# Patient Record
Sex: MALE | Race: WHITE | Employment: FULL TIME | URBAN - METROPOLITAN AREA
[De-identification: names, ages, dates, MRNs, and addresses within clinical notes are randomized per-mention and may not be internally consistent; named-entity substitution may affect disease eponyms.]

---

## 2017-05-04 ENCOUNTER — HOSPITAL ENCOUNTER (OUTPATIENT)
Dept: ENDOSCOPY | Age: 57
Discharge: HOME | End: 2017-05-04
Attending: INTERNAL MEDICINE | Admitting: INTERNAL MEDICINE

## 2017-05-04 LAB
A/G RATIO: 1 (ref 1.1–2.2)
ALBUMIN SERPL-MCNC: 3.5 G/DL (ref 3.4–5)
ALP BLD-CCNC: 37 U/L (ref 40–129)
ALT SERPL-CCNC: 17 U/L (ref 10–40)
ANION GAP SERPL CALCULATED.3IONS-SCNC: 13 MMOL/L (ref 3–16)
ANISOCYTOSIS: ABNORMAL
APTT: 33 SEC (ref 21–31.8)
AST SERPL-CCNC: 24 U/L (ref 15–37)
BASOPHILS ABSOLUTE: 0 K/UL (ref 0–0.2)
BASOPHILS RELATIVE PERCENT: 0.9 %
BILIRUB SERPL-MCNC: 1.2 MG/DL (ref 0–1)
BUN BLDV-MCNC: 7 MG/DL (ref 7–20)
CALCIUM SERPL-MCNC: 8.5 MG/DL (ref 8.3–10.6)
CHLORIDE BLD-SCNC: 100 MMOL/L (ref 99–110)
CO2: 26 MMOL/L (ref 21–32)
CREAT SERPL-MCNC: 1.1 MG/DL (ref 0.9–1.3)
EOSINOPHILS ABSOLUTE: 0.1 K/UL (ref 0–0.6)
EOSINOPHILS RELATIVE PERCENT: 1.4 %
GFR AFRICAN AMERICAN: >60
GFR NON-AFRICAN AMERICAN: >60
GLOBULIN: 3.5 G/DL
GLUCOSE BLD-MCNC: 99 MG/DL (ref 70–99)
HCT VFR BLD CALC: 33.9 % (ref 40.5–52.5)
HEMATOLOGY PATH CONSULT: YES
HEMOGLOBIN: 10.1 G/DL (ref 13.5–17.5)
IGA: 240 MG/DL (ref 70–400)
INR BLD: 1.19 (ref 0.85–1.15)
LYMPHOCYTES ABSOLUTE: 0.9 K/UL (ref 1–5.1)
LYMPHOCYTES RELATIVE PERCENT: 19 %
MAGNESIUM: 2.2 MG/DL (ref 1.8–2.4)
MCH RBC QN AUTO: 20.7 PG (ref 26–34)
MCHC RBC AUTO-ENTMCNC: 29.9 G/DL (ref 31–36)
MCV RBC AUTO: 69.2 FL (ref 80–100)
MICROCYTES: ABNORMAL
MONOCYTES ABSOLUTE: 0.6 K/UL (ref 0–1.3)
MONOCYTES RELATIVE PERCENT: 13 %
NEUTROPHILS ABSOLUTE: 3.3 K/UL (ref 1.7–7.7)
NEUTROPHILS RELATIVE PERCENT: 65.7 %
PDW BLD-RTO: 26.9 % (ref 12.4–15.4)
PLATELET # BLD: 207 K/UL (ref 135–450)
PLATELET SLIDE REVIEW: ADEQUATE
PMV BLD AUTO: 8.7 FL (ref 5–10.5)
POLYCHROMASIA: ABNORMAL
POTASSIUM SERPL-SCNC: 4.2 MMOL/L (ref 3.5–5.1)
PROTHROMBIN TIME: 13.5 SEC (ref 9.6–13)
RBC # BLD: 4.89 M/UL (ref 4.2–5.9)
SLIDE REVIEW: ABNORMAL
SODIUM BLD-SCNC: 139 MMOL/L (ref 136–145)
TOTAL PROTEIN: 7 G/DL (ref 6.4–8.2)
WBC # BLD: 5 K/UL (ref 4–11)

## 2017-05-05 LAB — HEMATOLOGY PATH CONSULT: NORMAL

## 2017-05-06 LAB
CEA: 4.6 NG/ML (ref 0–5)
TISSUE TRANSGLUTAMINASE IGA: 0 U/ML (ref 0–3)

## 2017-05-10 PROBLEM — C18.2 MALIGNANT NEOPLASM OF ASCENDING COLON (HCC): Status: ACTIVE | Noted: 2017-05-10

## 2017-05-10 PROBLEM — R05.9 COUGH: Status: ACTIVE | Noted: 2017-05-10

## 2017-05-12 ENCOUNTER — OFFICE VISIT (OUTPATIENT)
Dept: SURGERY | Age: 57
End: 2017-05-12

## 2017-05-12 VITALS
WEIGHT: 223 LBS | DIASTOLIC BLOOD PRESSURE: 90 MMHG | HEIGHT: 71 IN | SYSTOLIC BLOOD PRESSURE: 142 MMHG | BODY MASS INDEX: 31.22 KG/M2

## 2017-05-12 DIAGNOSIS — C18.2 MALIGNANT NEOPLASM OF ASCENDING COLON (HCC): Primary | ICD-10-CM

## 2017-05-12 PROCEDURE — 99203 OFFICE O/P NEW LOW 30 MIN: CPT | Performed by: SURGERY

## 2017-05-12 ASSESSMENT — ENCOUNTER SYMPTOMS
COUGH: 1
ALLERGIC/IMMUNOLOGIC NEGATIVE: 1
GASTROINTESTINAL NEGATIVE: 1
EYES NEGATIVE: 1

## 2017-05-17 ENCOUNTER — HOSPITAL ENCOUNTER (OUTPATIENT)
Dept: CT IMAGING | Age: 57
Discharge: HOME | End: 2017-05-17
Admitting: INTERNAL MEDICINE

## 2017-05-17 ENCOUNTER — HOSPITAL ENCOUNTER (OUTPATIENT)
Dept: PREADMISSION TESTING | Age: 57
Discharge: HOME | End: 2017-05-17
Attending: SURGERY | Admitting: SURGERY

## 2017-05-17 VITALS — BODY MASS INDEX: 30.52 KG/M2 | HEIGHT: 71 IN | WEIGHT: 218 LBS

## 2017-05-17 DIAGNOSIS — C18.2 MALIGNANT NEOPLASM OF ASCENDING COLON (HCC): ICD-10-CM

## 2017-05-17 DIAGNOSIS — R05.9 COUGH: ICD-10-CM

## 2017-05-17 LAB
ABO/RH: NORMAL
ANTIBODY SCREEN: NORMAL
APTT: 33.1 SEC (ref 21–31.8)
INR BLD: 1.01 (ref 0.85–1.15)
PROTHROMBIN TIME: 11.4 SEC (ref 9.6–13)

## 2017-05-27 PROBLEM — C18.9 COLON CANCER METASTASIZED TO LIVER (HCC): Status: ACTIVE | Noted: 2017-05-27

## 2017-05-27 PROBLEM — C78.7 COLON CANCER METASTASIZED TO LIVER (HCC): Status: ACTIVE | Noted: 2017-05-27

## 2017-05-30 ENCOUNTER — HOSPITAL ENCOUNTER (OUTPATIENT)
Dept: SURGERY | Age: 57
Discharge: HOME | End: 2017-05-30
Attending: SURGERY | Admitting: SURGERY

## 2017-05-30 ENCOUNTER — HOSPITAL ENCOUNTER (OUTPATIENT)
Dept: SURGERY | Age: 57
Discharge: HOME | End: 2017-05-15
Attending: SURGERY | Admitting: SURGERY

## 2017-05-30 VITALS
TEMPERATURE: 98.9 F | RESPIRATION RATE: 18 BRPM | HEIGHT: 71 IN | WEIGHT: 212.96 LBS | HEART RATE: 88 BPM | BODY MASS INDEX: 29.81 KG/M2 | SYSTOLIC BLOOD PRESSURE: 145 MMHG | OXYGEN SATURATION: 92 % | DIASTOLIC BLOOD PRESSURE: 87 MMHG

## 2017-05-30 DIAGNOSIS — C18.2 MALIGNANT NEOPLASM OF ASCENDING COLON (HCC): ICD-10-CM

## 2017-05-30 DIAGNOSIS — C18.2 MALIGNANT NEOPLASM OF ASCENDING COLON (HCC): Primary | ICD-10-CM

## 2017-05-30 DIAGNOSIS — R52 PAIN: ICD-10-CM

## 2017-05-30 LAB
ABO/RH: NORMAL
ANTIBODY SCREEN: NORMAL

## 2017-05-30 PROCEDURE — 77001 FLUOROGUIDE FOR VEIN DEVICE: CPT | Performed by: SURGERY

## 2017-05-30 PROCEDURE — 36561 INSERT TUNNELED CV CATH: CPT | Performed by: SURGERY

## 2017-05-30 RX ORDER — FENTANYL CITRATE 50 UG/ML
25 INJECTION, SOLUTION INTRAMUSCULAR; INTRAVENOUS EVERY 5 MIN PRN
Status: DISCONTINUED | OUTPATIENT
Start: 2017-05-30 | End: 2017-05-31 | Stop reason: HOSPADM

## 2017-05-30 RX ORDER — SODIUM CHLORIDE 0.9 % (FLUSH) 0.9 %
10 SYRINGE (ML) INJECTION PRN
Status: DISCONTINUED | OUTPATIENT
Start: 2017-05-30 | End: 2017-05-31 | Stop reason: HOSPADM

## 2017-05-30 RX ORDER — SODIUM CHLORIDE 0.9 % (FLUSH) 0.9 %
10 SYRINGE (ML) INJECTION EVERY 12 HOURS SCHEDULED
Status: DISCONTINUED | OUTPATIENT
Start: 2017-05-30 | End: 2017-05-31 | Stop reason: HOSPADM

## 2017-05-30 RX ORDER — SODIUM CHLORIDE, SODIUM LACTATE, POTASSIUM CHLORIDE, CALCIUM CHLORIDE 600; 310; 30; 20 MG/100ML; MG/100ML; MG/100ML; MG/100ML
INJECTION, SOLUTION INTRAVENOUS
Status: COMPLETED
Start: 2017-05-30 | End: 2017-05-30

## 2017-05-30 RX ORDER — FENTANYL CITRATE 50 UG/ML
50 INJECTION, SOLUTION INTRAMUSCULAR; INTRAVENOUS EVERY 5 MIN PRN
Status: DISCONTINUED | OUTPATIENT
Start: 2017-05-30 | End: 2017-05-31 | Stop reason: HOSPADM

## 2017-05-30 RX ORDER — HYDROCODONE BITARTRATE AND ACETAMINOPHEN 5; 325 MG/1; MG/1
1-2 TABLET ORAL EVERY 4 HOURS PRN
Qty: 20 TABLET | Refills: 0 | Status: SHIPPED | OUTPATIENT
Start: 2017-05-30 | End: 2017-06-06

## 2017-05-30 RX ORDER — OXYCODONE HYDROCHLORIDE AND ACETAMINOPHEN 5; 325 MG/1; MG/1
1 TABLET ORAL PRN
Status: ACTIVE | OUTPATIENT
Start: 2017-05-30 | End: 2017-05-30

## 2017-05-30 RX ORDER — OXYCODONE HYDROCHLORIDE AND ACETAMINOPHEN 5; 325 MG/1; MG/1
2 TABLET ORAL PRN
Status: ACTIVE | OUTPATIENT
Start: 2017-05-30 | End: 2017-05-30

## 2017-05-30 RX ORDER — HYDROMORPHONE HCL 110MG/55ML
0.5 PATIENT CONTROLLED ANALGESIA SYRINGE INTRAVENOUS EVERY 5 MIN PRN
Status: DISCONTINUED | OUTPATIENT
Start: 2017-05-30 | End: 2017-05-31 | Stop reason: HOSPADM

## 2017-05-30 RX ORDER — CEFAZOLIN SODIUM 2 G/100ML
2 INJECTION, SOLUTION INTRAVENOUS ONCE
Status: DISCONTINUED | OUTPATIENT
Start: 2017-05-30 | End: 2017-05-31 | Stop reason: HOSPADM

## 2017-05-30 RX ORDER — HYDROMORPHONE HCL 110MG/55ML
0.25 PATIENT CONTROLLED ANALGESIA SYRINGE INTRAVENOUS EVERY 5 MIN PRN
Status: DISCONTINUED | OUTPATIENT
Start: 2017-05-30 | End: 2017-05-31 | Stop reason: HOSPADM

## 2017-05-30 RX ORDER — LIDOCAINE HYDROCHLORIDE 10 MG/ML
1 INJECTION, SOLUTION EPIDURAL; INFILTRATION; INTRACAUDAL; PERINEURAL
Status: ACTIVE | OUTPATIENT
Start: 2017-05-30 | End: 2017-05-30

## 2017-05-30 RX ORDER — LABETALOL HYDROCHLORIDE 5 MG/ML
5 INJECTION, SOLUTION INTRAVENOUS EVERY 10 MIN PRN
Status: DISCONTINUED | OUTPATIENT
Start: 2017-05-30 | End: 2017-05-31 | Stop reason: HOSPADM

## 2017-05-30 RX ORDER — ONDANSETRON 2 MG/ML
4 INJECTION INTRAMUSCULAR; INTRAVENOUS
Status: ACTIVE | OUTPATIENT
Start: 2017-05-30 | End: 2017-05-30

## 2017-05-30 RX ORDER — CEFAZOLIN SODIUM 2 G/100ML
2 INJECTION, SOLUTION INTRAVENOUS ONCE
Status: COMPLETED | OUTPATIENT
Start: 2017-05-30 | End: 2017-05-30

## 2017-05-30 RX ORDER — MEPERIDINE HYDROCHLORIDE 25 MG/ML
12.5 INJECTION INTRAMUSCULAR; INTRAVENOUS; SUBCUTANEOUS EVERY 5 MIN PRN
Status: DISCONTINUED | OUTPATIENT
Start: 2017-05-30 | End: 2017-05-31 | Stop reason: HOSPADM

## 2017-05-30 RX ORDER — SODIUM CHLORIDE 9 MG/ML
INJECTION, SOLUTION INTRAVENOUS CONTINUOUS
Status: DISCONTINUED | OUTPATIENT
Start: 2017-05-30 | End: 2017-05-31 | Stop reason: HOSPADM

## 2017-05-30 RX ADMIN — SODIUM CHLORIDE, SODIUM LACTATE, POTASSIUM CHLORIDE, CALCIUM CHLORIDE 1000 ML: 600; 310; 30; 20 INJECTION, SOLUTION INTRAVENOUS at 10:59

## 2017-05-30 RX ADMIN — CEFAZOLIN SODIUM 2 G: 2 INJECTION, SOLUTION INTRAVENOUS at 12:21

## 2017-05-30 ASSESSMENT — PAIN - FUNCTIONAL ASSESSMENT: PAIN_FUNCTIONAL_ASSESSMENT: 0-10

## 2017-05-30 ASSESSMENT — PAIN SCALES - GENERAL
PAINLEVEL_OUTOF10: 0
PAINLEVEL_OUTOF10: 1
PAINLEVEL_OUTOF10: 1

## 2017-06-13 PROBLEM — T45.1X5A CHEMOTHERAPY-INDUCED NEUTROPENIA (HCC): Status: ACTIVE | Noted: 2017-06-13

## 2017-06-13 PROBLEM — D70.1 CHEMOTHERAPY-INDUCED NEUTROPENIA (HCC): Status: ACTIVE | Noted: 2017-06-13

## 2017-06-27 PROBLEM — Z51.11 ENCOUNTER FOR ANTINEOPLASTIC CHEMOTHERAPY: Status: ACTIVE | Noted: 2017-06-27

## 2017-07-19 ENCOUNTER — HOSPITAL ENCOUNTER (OUTPATIENT)
Dept: CT IMAGING | Age: 57
Discharge: HOME | End: 2017-07-19
Attending: INTERNAL MEDICINE | Admitting: INTERNAL MEDICINE

## 2017-07-19 DIAGNOSIS — C18.2 MALIGNANT NEOPLASM OF ASCENDING COLON (HCC): ICD-10-CM

## 2017-07-19 DIAGNOSIS — C18.9 COLON CANCER METASTASIZED TO LIVER (HCC): ICD-10-CM

## 2017-07-19 DIAGNOSIS — C78.7 COLON CANCER METASTASIZED TO LIVER (HCC): ICD-10-CM

## 2017-07-19 DIAGNOSIS — Z51.11 ENCOUNTER FOR ANTINEOPLASTIC CHEMOTHERAPY: ICD-10-CM

## 2017-08-08 PROBLEM — D69.59 CHEMOTHERAPY-INDUCED THROMBOCYTOPENIA: Status: ACTIVE | Noted: 2017-08-08

## 2017-08-08 PROBLEM — T45.1X5A CHEMOTHERAPY-INDUCED THROMBOCYTOPENIA: Status: ACTIVE | Noted: 2017-08-08

## 2017-09-22 ENCOUNTER — HOSPITAL ENCOUNTER (OUTPATIENT)
Dept: CT IMAGING | Age: 57
Discharge: HOME | End: 2017-09-22
Attending: NURSE PRACTITIONER | Admitting: NURSE PRACTITIONER

## 2017-09-22 DIAGNOSIS — C18.2 MALIGNANT NEOPLASM OF ASCENDING COLON (HCC): ICD-10-CM

## 2017-09-22 DIAGNOSIS — C18.2 ASCENDING COLON MALIGNANT NEOPLASM (HCC): ICD-10-CM

## 2017-09-29 ENCOUNTER — HOSPITAL ENCOUNTER (OUTPATIENT)
Dept: CT IMAGING | Age: 57
Discharge: HOME | End: 2017-09-29
Attending: INTERNAL MEDICINE | Admitting: INTERNAL MEDICINE

## 2017-09-29 DIAGNOSIS — R05.9 COUGH: ICD-10-CM

## 2017-09-29 LAB
A/G RATIO: 1.1 (ref 1.1–2.2)
ALBUMIN SERPL-MCNC: 4 G/DL (ref 3.4–5)
ALP BLD-CCNC: 67 U/L (ref 40–129)
ALT SERPL-CCNC: 33 U/L (ref 10–40)
ANION GAP SERPL CALCULATED.3IONS-SCNC: 10 MMOL/L (ref 3–16)
AST SERPL-CCNC: 42 U/L (ref 15–37)
BILIRUB SERPL-MCNC: 0.8 MG/DL (ref 0–1)
BUN BLDV-MCNC: 5 MG/DL (ref 7–20)
CALCIUM SERPL-MCNC: 8.8 MG/DL (ref 8.3–10.6)
CHLORIDE BLD-SCNC: 104 MMOL/L (ref 99–110)
CO2: 26 MMOL/L (ref 21–32)
CREAT SERPL-MCNC: 0.8 MG/DL (ref 0.9–1.3)
GFR AFRICAN AMERICAN: >60
GFR NON-AFRICAN AMERICAN: >60
GLOBULIN: 3.7 G/DL
GLUCOSE BLD-MCNC: 110 MG/DL (ref 70–99)
POTASSIUM SERPL-SCNC: 4.8 MMOL/L (ref 3.5–5.1)
SODIUM BLD-SCNC: 140 MMOL/L (ref 136–145)
TOTAL PROTEIN: 7.7 G/DL (ref 6.4–8.2)

## 2017-10-02 ENCOUNTER — PRE-PROCEDURE TELEPHONE (OUTPATIENT)
Dept: INTERVENTIONAL RADIOLOGY/VASCULAR | Age: 57
End: 2017-10-02

## 2017-10-05 ENCOUNTER — HOSPITAL ENCOUNTER (OUTPATIENT)
Dept: ULTRASOUND IMAGING | Age: 57
Discharge: HOME | End: 2017-10-05

## 2017-10-05 VITALS
BODY MASS INDEX: 30.92 KG/M2 | WEIGHT: 216 LBS | HEART RATE: 80 BPM | RESPIRATION RATE: 15 BRPM | OXYGEN SATURATION: 94 % | HEIGHT: 70 IN | DIASTOLIC BLOOD PRESSURE: 80 MMHG | TEMPERATURE: 97 F | SYSTOLIC BLOOD PRESSURE: 131 MMHG

## 2017-10-05 DIAGNOSIS — K76.9 LIVER LESION: ICD-10-CM

## 2017-10-05 DIAGNOSIS — C18.2 ASCENDING COLON MALIGNANT NEOPLASM (HCC): ICD-10-CM

## 2017-10-05 LAB
A/G RATIO: 1 (ref 1.1–2.2)
ALBUMIN SERPL-MCNC: 3.6 G/DL (ref 3.4–5)
ALP BLD-CCNC: 53 U/L (ref 40–129)
ALT SERPL-CCNC: 23 U/L (ref 10–40)
ANION GAP SERPL CALCULATED.3IONS-SCNC: 12 MMOL/L (ref 3–16)
APTT: 33.8 SEC (ref 24.1–34.9)
AST SERPL-CCNC: 33 U/L (ref 15–37)
BASOPHILS ABSOLUTE: 0 K/UL (ref 0–0.2)
BASOPHILS RELATIVE PERCENT: 0.2 %
BILIRUB SERPL-MCNC: 1 MG/DL (ref 0–1)
BUN BLDV-MCNC: 5 MG/DL (ref 7–20)
CALCIUM SERPL-MCNC: 8.6 MG/DL (ref 8.3–10.6)
CHLORIDE BLD-SCNC: 101 MMOL/L (ref 99–110)
CO2: 26 MMOL/L (ref 21–32)
CREAT SERPL-MCNC: 0.9 MG/DL (ref 0.9–1.3)
EOSINOPHILS ABSOLUTE: 0.1 K/UL (ref 0–0.6)
EOSINOPHILS RELATIVE PERCENT: 2.3 %
GFR AFRICAN AMERICAN: >60
GFR NON-AFRICAN AMERICAN: >60
GLOBULIN: 3.6 G/DL
GLUCOSE BLD-MCNC: 103 MG/DL (ref 70–99)
HCT VFR BLD CALC: 42.6 % (ref 40.5–52.5)
HEMOGLOBIN: 14.4 G/DL (ref 13.5–17.5)
INR BLD: 1 (ref 0.85–1.15)
LYMPHOCYTES ABSOLUTE: 1.1 K/UL (ref 1–5.1)
LYMPHOCYTES RELATIVE PERCENT: 26.1 %
MCH RBC QN AUTO: 34 PG (ref 26–34)
MCHC RBC AUTO-ENTMCNC: 33.9 G/DL (ref 31–36)
MCV RBC AUTO: 100.3 FL (ref 80–100)
MONOCYTES ABSOLUTE: 0.6 K/UL (ref 0–1.3)
MONOCYTES RELATIVE PERCENT: 14.8 %
NEUTROPHILS ABSOLUTE: 2.5 K/UL (ref 1.7–7.7)
NEUTROPHILS RELATIVE PERCENT: 56.6 %
PDW BLD-RTO: 19.5 % (ref 12.4–15.4)
PLATELET # BLD: 81 K/UL (ref 135–450)
PLATELET SLIDE REVIEW: ABNORMAL
PMV BLD AUTO: 7.1 FL (ref 5–10.5)
POTASSIUM SERPL-SCNC: 4 MMOL/L (ref 3.5–5.1)
PROTHROMBIN TIME: 11.3 SEC (ref 9.6–13)
RBC # BLD: 4.24 M/UL (ref 4.2–5.9)
RBC # BLD: NORMAL 10*6/UL
SLIDE REVIEW: ABNORMAL
SODIUM BLD-SCNC: 139 MMOL/L (ref 136–145)
TOTAL PROTEIN: 7.2 G/DL (ref 6.4–8.2)
WBC # BLD: 4.3 K/UL (ref 4–11)

## 2017-10-05 RX ORDER — FENTANYL CITRATE 50 UG/ML
INJECTION, SOLUTION INTRAMUSCULAR; INTRAVENOUS
Status: COMPLETED | OUTPATIENT
Start: 2017-10-05 | End: 2017-10-05

## 2017-10-05 RX ORDER — BACITRACIN, NEOMYCIN, POLYMYXIN B 400; 3.5; 5 [USP'U]/G; MG/G; [USP'U]/G
OINTMENT TOPICAL ONCE
Status: DISCONTINUED | OUTPATIENT
Start: 2017-10-05 | End: 2017-10-06 | Stop reason: HOSPADM

## 2017-10-05 RX ORDER — MIDAZOLAM HYDROCHLORIDE 1 MG/ML
INJECTION INTRAMUSCULAR; INTRAVENOUS
Status: COMPLETED | OUTPATIENT
Start: 2017-10-05 | End: 2017-10-05

## 2017-10-05 RX ORDER — LIDOCAINE HYDROCHLORIDE 10 MG/ML
5 INJECTION, SOLUTION EPIDURAL; INFILTRATION; INTRACAUDAL; PERINEURAL ONCE
Status: DISCONTINUED | OUTPATIENT
Start: 2017-10-05 | End: 2017-10-06 | Stop reason: HOSPADM

## 2017-10-05 RX ORDER — ACETAMINOPHEN 325 MG/1
650 TABLET ORAL EVERY 4 HOURS PRN
Status: DISCONTINUED | OUTPATIENT
Start: 2017-10-05 | End: 2017-10-06 | Stop reason: HOSPADM

## 2017-10-05 RX ADMIN — FENTANYL CITRATE 50 MCG: 50 INJECTION, SOLUTION INTRAMUSCULAR; INTRAVENOUS at 09:03

## 2017-10-05 RX ADMIN — FENTANYL CITRATE 50 MCG: 50 INJECTION, SOLUTION INTRAMUSCULAR; INTRAVENOUS at 09:10

## 2017-10-05 RX ADMIN — MIDAZOLAM HYDROCHLORIDE 1 MG: 1 INJECTION INTRAMUSCULAR; INTRAVENOUS at 09:10

## 2017-10-05 RX ADMIN — MIDAZOLAM HYDROCHLORIDE 1 MG: 1 INJECTION INTRAMUSCULAR; INTRAVENOUS at 09:03

## 2017-10-05 RX ADMIN — FENTANYL CITRATE 50 MCG: 50 INJECTION, SOLUTION INTRAMUSCULAR; INTRAVENOUS at 08:59

## 2017-10-05 RX ADMIN — MIDAZOLAM HYDROCHLORIDE 1 MG: 1 INJECTION INTRAMUSCULAR; INTRAVENOUS at 08:59

## 2017-10-05 ASSESSMENT — PAIN - FUNCTIONAL ASSESSMENT: PAIN_FUNCTIONAL_ASSESSMENT: 0-10

## 2017-10-05 ASSESSMENT — PAIN SCALES - GENERAL: PAINLEVEL_OUTOF10: 0

## 2017-10-05 NOTE — PROGRESS NOTES
PT DISCHARGED HOME WITH FAMILY, PT IN STABLE CONDITION, TRANSPORTED TO CAR VIA WHEELCHAIR WITH this RN.

## 2017-10-05 NOTE — PRE SEDATION
FERROUS SULFATE PO Take by mouth   Yes Historical Provider, MD   prochlorperazine (COMPAZINE) 5 MG tablet Take 1 tablet by mouth every 6 hours as needed for Nausea 5/26/17   Vignesh Curran CNP     Coumadin Use Last 7 Days:  no  Antiplatelet drug therapy use last 7 days: no  Other anticoagulant use last 7 days: no  Additional Medication Information:        Pre-Sedation Documentation and Exam:   I have reviewed the patients history and review of systems.     Mallampati Airway Assessment:  Mallampati Class I - (soft palate, fauces, uvula & anterior/posterior tonsillar pillars are visible)    Prior History of Anesthesia Complications:   none    ASA Classification:  Class 2  A normal healthy patient with mild systemic disease    Sedation/ Anesthesia Plan:   intravenous sedation    Medications Planned:   midazolam (Versed) intravenously and fentanyl intravenously    Patient is an appropriate candidate for plan of sedation: yes    Electronically signed by Tan Morris MD on 10/5/2017 at 8:44 AM

## 2017-10-05 NOTE — BRIEF OP NOTE
Brief Postoperative Note    Dock Child  YOB: 1960  [id number]    Pre-operative Diagnosis: Colon cancer/hepatic nodules    Post-operative Diagnosis: Same    Procedure: CT guided liver biopsy    Anesthesia: Moderate Sedation    Surgeons/Assistants: Anthony Ro    Estimated Blood Loss: less than 50     Complications: None    Specimens: Was Obtained: 18g x2. Findings: Approach- right anteromedial, intercostal to right lobe hypodensity.       Electronically signed by Steven Badillo MD on 10/5/2017 at 9:25 AM

## 2017-10-05 NOTE — IP AVS SNAPSHOT
Dizziness or lightheadedness  Sudden or increased shortness of breath  Sudden chest pain  Fever above 101F  Shaking chills  Increasing redness, tenderness, or swelling at the biopsy site  Drainage from the biopsy site  Opening of the biopsy site  Rectal bleeding or bloody stools  Increasing pain, with or without activity        SEDATION DISCHARGE INSTRUCTIONS    · Wear your seatbelt home. · You are under the influence of drugs-do not drink alcohol, drive, operate machinery, or make any important decisions or sign any legal documentsfor 24 hours  · A responsible adult needs to be with you for 24 hours. · You may experience lightheadedness, dizziness, nausea, heightened emotions and/or sleepiness following surgery. · Rest at home today- increase activity as tolerated. · Progress slowly to a regular diet unless your physician has instructed you otherwise. Drink plenty of water. · If nausea becomes a problem call your physician. · Do not drive or operate machinery while taking narcotics. [facility] Signup     Our records indicate that you have an active SwingShot account. You can view your After Visit Summary by going to [email or web address] and logging in with your SwingShot username and password. If you don't have a SwingShot username and password but a parent or guardian has access to your record, the parent or guardian should login with their own SwingShot username and password and access your record to view the After Visit Summary. Additional Information  If you have questions, please contact the physician practice where you receive care. Remember, SwingShot is NOT to be used for urgent needs. For medical emergencies, dial 911. For questions regarding your SwingShot account call [phone]. If you have a clinical question, please call your doctor's office. View your information online  ? Review your current list of  medications, immunization, and allergies. ? Review your future test results online . ? Review your discharge instructions provided by your caregivers at discharge    Certain functionality such as prescription refills, scheduling appointments or sending messages to your provider are not activated if your provider does not use [facility] in his/her office    For questions regarding your MyChart account call [phone]. If you have a clinical question, please call your doctor's office. The information on all pages of the After Visit Summary has been reviewed with me, the patient and/or responsible adult, by my health care provider(s). I had the opportunity to ask questions regarding this information. I understand I should dispose of my armband safely at home to protect my health information. A complete copy of the After Visit Summary has been given to me, the patient and/or responsible adult.            Patient Signature/Responsible Adult:____________________    Clinician Signature:_____________________    Date:_____________________    Time:_____________________

## 2017-10-25 ENCOUNTER — TELEPHONE (OUTPATIENT)
Dept: SURGERY | Age: 57
End: 2017-10-25

## 2017-10-31 ENCOUNTER — INITIAL CONSULT (OUTPATIENT)
Dept: SURGERY | Age: 57
End: 2017-10-31

## 2017-10-31 VITALS
HEIGHT: 70 IN | WEIGHT: 219 LBS | DIASTOLIC BLOOD PRESSURE: 88 MMHG | SYSTOLIC BLOOD PRESSURE: 136 MMHG | BODY MASS INDEX: 31.35 KG/M2

## 2017-10-31 DIAGNOSIS — C18.2 MALIGNANT NEOPLASM OF ASCENDING COLON (HCC): Primary | ICD-10-CM

## 2017-10-31 DIAGNOSIS — R16.0 LIVER MASS, LEFT LOBE: ICD-10-CM

## 2017-10-31 DIAGNOSIS — J98.59 MEDIASTINAL MASS: ICD-10-CM

## 2017-10-31 PROCEDURE — 99245 OFF/OP CONSLTJ NEW/EST HI 55: CPT | Performed by: SURGERY

## 2017-10-31 NOTE — PROGRESS NOTES
[address] [place] [address] [address] [address]  Dept: [phone]  Dept Fax: [phone]    Visit Date: 10/31/2017    Katja Child is a 61 y.o. male who presents today for: Surgical Consult (colon cancer - Dr Alyse Duverney) and Established New Doctor      HPI:       Katja Raza is a 61 y.o. male referred to me by Dr. Alyse Duverney for metastatic colon cancer. Patient originally underwent colonoscopy in May of this year for iron deficiency anemia. At that time he was found to have a nearly circumferential ascending colon cancer, biopsy-proven adenocarcinoma. He underwent staging CT scans as well as PET scan which revealed multiple hepatic lesions, as well as a posterior mediastinal lymph node. On PET scan 2 of the liver lesions were hypermetabolic as well as the posterior mediastinal lymph nodes, as well as the primary colon cancer and mesenteric lymph nodes. He is now receiving chemotherapy with FOLFOX plus Avastin, and is on his 10th cycle, about to receive his 11th. He has recently undergone repeat CT scan which shows stable size of the mediastinal lymph node, as well as decreased evidence of hepatic metastases. He underwent CT-guided liver biopsy which was found to have no evidence of metastatic disease. He is here for further recommendations regarding surgical therapy. Currently he denies symptoms. He denies rectal bleeding. He denies obstruction. He is eating a regular diet and having normal bowel movements. He denies fever, chills, nausea, vomiting, food intolerance, weight loss. He is tolerating his chemotherapy very well with only minimal side effects of neuropathy. Patient's problem list, medications, past medical, surgical, family, and social histories were reviewed and updated in the chart as indicated today.     Past Medical History:   Diagnosis Date    Anemia     Colon cancer ([address]     Hypertension        Past Surgical History:   Procedure Laterality Date    COLONOSCOPY      CYST REMOVAL  1973    LIVER BIOPSY Right 10/05/2017    [facility]en MD at [facility] in specials as outpt    TUNNELED VENOUS PORT PLACEMENT Left 05/30/2017       Family History: denies family history of colon cancer    Social History:   Social History   Substance Use Topics    Smoking status: Never Smoker    Smokeless tobacco: Never Used    Alcohol use 1.2 oz/week     2 Cans of beer per week      Comment: daily       Tobacco cessation counseling provided as appropriate. REVIEW OF SYSTEMS:    Pertinent positives and negatives are mentioned in the HPI above. Otherwise, all other systems were reviewed and negative. Objective:     Physical Exam   /88 (Site: Left Arm, Position: Sitting, Cuff Size: Large Adult)   Ht 5' 10\" (1.778 m)   Wt 219 lb (99.3 kg)   BMI 31.42 kg/m²   Constitutional: Appears well-developed and well-nourished. Grooming appropriate. No gross deformities. Body mass index is 31.42 kg/m². Eyes: No scleral icterus. Conjunctiva/lids normal. Vision intact grossly. Pupils equal/symmetric, reactive bilaterally. ENT: External ears/nose without defect, scars, or masses. Hearing grossly intact. No facial deformity. Lips normal, normal dentition. Neck: No masses. Trachea midline. No crepitus. Thyroid not enlarged. Cardiovascular: Normal rate. No peripheral edema. Abdominal aorta normal size to palpation. Pulmonary/Chest: Effort normal. No respiratory distress. No wheezes. No use of accessory muscles. Musculoskeletal: Normal range of motion x all 4 extremities and head/neck, without deformity, pain, or crepitus, with normal strength and tone. Normal gait. Nails without clubbing or cyanosis. Neurological: Alert and oriented to person, place, and time. No gross deficits. Sensation intact. Skin: Skin is dry. No rashes noted. No pallor. No induration of nodules. Psychiatric: Normal mood and affect.  Behavior normal. Oriented to person, place, and time. Judgment and insight reasonable. Abdominal/wound: soft, nontender    Pertinent recent lab and radiology results reviewed, including several previous CT scans as well as colonoscopy, pathology, PET scan, oncology notes. Last colonoscopy: May 2017 - ascending mass      Assessment/Plan:     A/P:  New problem(s): Colon cancer of ascending colon, metastatic to liver and mediastinal lymph nodes  Established problem(s): none  Additional workup/treatment planned: completion chemo, restaging, possible surgery after that  Risk of complications/morbidity: high    Patient with colon cancer metastasized to the liver as well as the posterior mediastinal lymph node. Fortunately, he is relatively asymptomatic and has tolerated chemotherapy very well. He is soon to begin his 11th out of 12 cycles of FOLFOX plus Avastin. I think his recent liver biopsy is a bit misleading, as he likely still has some residual disease or at least treatment effect in the left lobe of the liver lesion. I did review the CT scan with liver surgeon Dr. Benja Bright who does think that the more superficial lesion in the left lobe can be resected simultaneously with another abdominal surgery, however the deeper lesion would require either ablation or left hepatectomy. Overall, I would like to be appropriately aggressive in an otherwise healthy 65-year-old gentleman who seems to be tolerating treatment fairly well up to this point. I also spoke to the patient's oncologist over the phone  to discuss the plan. After the patient's 12th cycle of chemotherapy, we will plan for restaging PET CT scan. If his disease is stable or has improved, then I think there is a good argument for being surgically aggressive including right colectomy and potential liver resection, as well as excision of the posterior mediastinal lymph node.   I would involve a cardiothoracic surgeon for their opinion regarding the mediastinal node. I will also plan on presenting the patient at the [facility] tumor board discussion after he is restaged. I did discuss all of this with the patient including the various permutations and potential paths depending on the repeat imaging. I did discuss that if the mediastinal lymph node or other metastatic disease is unresectable or has progressed, removing the primary may not be in his best interest.  He understood all this and asked appropriate questions. I did discuss with him the fairly complex nature of his disease. I will see him back in the office after he has undergone restaging and plan for potential surgical therapy at that time. I will also put a referral in to cardiothoracic surgery in anticipation of potential resection of the posterior mediastinal lymph node. DISPOSITION:  F/u after restaging, discussion at tumor board, referral to liver surgery and CT surgery    My findings will be relayed to consulting practitioner or PCP via Epic    Total encounter time:  80 min with > 50% spent with face-to-face counseling and coordination of care, including: Thorough discussion with the patient regarding staging, workup, treatment possibilities of his stage IV colon cancer. Coordination with Dr. Nannette Schmidt. Coordination with patient's oncologist.  Coordination with cardiothoracic surgery. Note completed using dictation software, please excuse any errors.     Electronically signed by Bridger Logan MD on 10/31/2017 at 1:45 PM

## 2017-11-08 ENCOUNTER — OFFICE VISIT (OUTPATIENT)
Dept: CARDIOTHORACIC SURGERY | Age: 57
End: 2017-11-08

## 2017-11-08 VITALS
OXYGEN SATURATION: 98 % | WEIGHT: 222.6 LBS | HEART RATE: 79 BPM | TEMPERATURE: 98.6 F | DIASTOLIC BLOOD PRESSURE: 100 MMHG | BODY MASS INDEX: 31.16 KG/M2 | HEIGHT: 71 IN | SYSTOLIC BLOOD PRESSURE: 144 MMHG

## 2017-11-08 DIAGNOSIS — R91.1 LUNG NODULE, SOLITARY: Primary | ICD-10-CM

## 2017-11-08 PROCEDURE — 99242 OFF/OP CONSLTJ NEW/EST SF 20: CPT | Performed by: THORACIC SURGERY (CARDIOTHORACIC VASCULAR SURGERY)

## 2017-11-08 RX ORDER — VITAMIN B COMPLEX
1 CAPSULE ORAL DAILY
COMMUNITY
End: 2018-04-12 | Stop reason: SDUPTHER

## 2017-11-08 RX ORDER — ASCORBIC ACID 500 MG
500 TABLET ORAL DAILY
Status: ON HOLD | COMMUNITY
End: 2021-04-13 | Stop reason: HOSPADM

## 2017-11-08 NOTE — PROGRESS NOTES
Review of Systems:  Constitutional:  No night sweats, headaches, weight loss. Eyes:  No glaucoma, cataracts. No blurry vision. ENMT:  No nosebleeds, deviated septum. No hearing difficulties. Cardiac:  No arrhythmias, previous MI. No chest pain or palpitations. Vascular:  No claudication, varicosities. GI:  No PUD, heartburn. No constipation or diarrhea. :  No kidney stones, frequent UTIs. No difficulty voiding. Musculoskeletal:  No arthritis, gout. Respiratory:  No SOB, emphysema, asthma. Integumentary:  No dermatitis, itching, rash. Neurological:  No stroke, TIAs, seizures. No dizziness. Psychiatric:  No depression, anxiety. Endocrine: No diabetes, thyroid issues. Hematologic:  + anemia - passed out while driving which is when they found the cancer and found him to be severely anemic. No bleeding, easy bruising. Immunologic:  + colon/ liver cancer. Currently on chemo- round 11.

## 2017-11-08 NOTE — PROGRESS NOTES
Provider, MD   testosterone (ANDROGEL; TESTIM) 50 MG/5GM (1%) GEL 1% gel Place 5 g onto the skin   Yes Historical Provider, MD   FERROUS SULFATE PO Take by mouth   Yes Historical Provider, MD   prochlorperazine (COMPAZINE) 5 MG tablet Take 1 tablet by mouth every 6 hours as needed for Nausea 5/26/17   Kayli Elmore [facility]        Facility Administered Medications: Allergies: Allergies   Allergen Reactions    Penicillins Hives     1965        Social History:    Working:   Caffeine:   Lifestyle:    Social History     Social History    Marital status:      Spouse name: N/A    Number of children: N/A    Years of education: N/A     Occupational History          Social History Main Topics    Smoking status: Never Smoker    Smokeless tobacco: Never Used    Alcohol use 1.2 oz/week     2 Cans of beer per week      Comment: daily     Drug use: No    Sexual activity: Not on file     Other Topics Concern    Not on file     Social History Narrative    No narrative on file       Family History:  Heart Disease:   Stroke:   Cancer:   Diabetes:   Hypertension:   Aneurysm/PVD:         Problem Relation Age of Onset    Cancer Father      Skin (melanoma)    Mult Sclerosis Mother     No Known Problems Brother        Review of Systems:  Review of Systems:  Constitutional:  No night sweats, headaches, weight loss. Eyes:  No glaucoma, cataracts. No blurry vision. ENMT:  No nosebleeds, deviated septum. No hearing difficulties. Cardiac:  No arrhythmias, previous MI. No chest pain or palpitations. Vascular:  No claudication, varicosities. GI:  No PUD, heartburn. No constipation or diarrhea. :  No kidney stones, frequent UTIs. No difficulty voiding. Musculoskeletal:  No arthritis, gout. Respiratory:  No SOB, emphysema, asthma. Integumentary:  No dermatitis, itching, rash. Neurological:  No stroke, TIAs, seizures. No dizziness. Psychiatric:  No depression, anxiety.   Endocrine: No diabetes, the last 72 hours. Cardiac Enzymes: No results for input(s): CKTOTAL, CKMB, CKMBINDEX, TROPONINI in the last 72 hours. PT/INR: No results for input(s): PROTIME, INR in the last 72 hours. APTT: No results for input(s): APTT in the last 72 hours. Liver Profile:  Lab Results   Component Value Date    AST 33 10/05/2017    ALT 23 10/05/2017    BILITOT 1.0 10/05/2017    ALKPHOS 53 10/05/2017   No results found for: CHOL, HDL, TRIG  UA: No results found for: NITRITE, COLORU, PHUR, LABCAST, [address], RBCUA, MUCUS, TRICHOMONAS, YEAST, BACTERIA, CLARITYU, SPECGRAV, LEUKOCYTESUR, UROBILINOGEN, BILIRUBINUR, BLOODU, GLUCOSEU, AMORPHOUS    History obtained: chart, pt    Risk factors: Colorectal cancer    Diagnosis:  Solitary lung lesion possible for lung metastasis    Plan: As discussed with with Dr. Ellan Goodell we would like to be aggressive with 61years old male with function we will repeat his CT PET after finish chemoradiation if lung lesion persist we will plan for right lower lobe wedge possible mediastinal lymphadenectomy thoracoscopic in conjunction with the procedure  My personal belief we will improve the patient prognosis if we can reach apparent free cancer status by resecting all the metastatic disease and local control of the initial disease  My procedure after positioning with take roughly around 30-40 minutes with normal anatomy and no adhesion  Risk and benefits including bleeding infection prolonged air leak was discussed with the patient's we will plan for surgery as the main surgery performed  If the lesion disappeared in the last CT PET we will inform the patient's we'll cancel our procedure      Typical periop/postop course reviewed including initial limitations on driving/heavy lifting. Risks, benefits and postoperative complications discussed including bleeding, infection, stroke, death, postop pulmonary and renal issues.     Whitney Stallworth MD FACS

## 2017-11-28 ENCOUNTER — TELEPHONE (OUTPATIENT)
Dept: SURGERY | Age: 57
End: 2017-11-28

## 2017-12-18 ENCOUNTER — TELEPHONE (OUTPATIENT)
Dept: SURGERY | Age: 57
End: 2017-12-18

## 2017-12-20 ENCOUNTER — TELEPHONE (OUTPATIENT)
Dept: SURGERY | Age: 57
End: 2017-12-20

## 2017-12-21 ENCOUNTER — TELEPHONE (OUTPATIENT)
Dept: SURGERY | Age: 57
End: 2017-12-21

## 2017-12-21 NOTE — TELEPHONE ENCOUNTER
Received call from Dr. Skyler Orourke office stating that the patient was told he would be having a combined case surgery at the beginning of January. Please advise.

## 2017-12-23 NOTE — TELEPHONE ENCOUNTER
Talked to Dr Fermin Meadows and discussed him at tumor board. He needs a liver MRI then needs to see Dr Fermin Meadows and me in the office in next 1-2  weeks. Then will plan for surgery in the next few weeks. Thinks we should hold off on the lung resection for now.

## 2017-12-29 ENCOUNTER — TELEPHONE (OUTPATIENT)
Dept: SURGERY | Age: 57
End: 2017-12-29

## 2017-12-29 DIAGNOSIS — C18.9 COLON CANCER METASTASIZED TO LIVER (HCC): ICD-10-CM

## 2017-12-29 DIAGNOSIS — C18.2 MALIGNANT NEOPLASM OF ASCENDING COLON (HCC): Primary | ICD-10-CM

## 2017-12-29 DIAGNOSIS — C78.7 COLON CANCER METASTASIZED TO LIVER (HCC): ICD-10-CM

## 2018-01-02 ENCOUNTER — TELEPHONE (OUTPATIENT)
Dept: SURGERY | Age: 58
End: 2018-01-02

## 2018-01-04 ENCOUNTER — HOSPITAL ENCOUNTER (OUTPATIENT)
Dept: MRI IMAGING | Age: 58
Discharge: HOME | End: 2018-01-04
Attending: SURGERY | Admitting: SURGERY

## 2018-01-04 DIAGNOSIS — C18.9 COLON CANCER METASTASIZED TO LIVER (HCC): ICD-10-CM

## 2018-01-04 DIAGNOSIS — C18.2 MALIGNANT NEOPLASM OF ASCENDING COLON (HCC): ICD-10-CM

## 2018-01-04 DIAGNOSIS — C78.7 COLON CANCER METASTASIZED TO LIVER (HCC): ICD-10-CM

## 2018-01-05 ENCOUNTER — TELEPHONE (OUTPATIENT)
Dept: SURGERY | Age: 58
End: 2018-01-05

## 2018-01-05 NOTE — TELEPHONE ENCOUNTER
Patient called regarding an appointment that was supposed to be scheduled for 1/9/18. Patient requesting a call back from Shannan Barajas     [phone] (home)

## 2018-01-09 ENCOUNTER — SURG/PROC ORDERS (OUTPATIENT)
Dept: SURGERY | Age: 58
End: 2018-01-09

## 2018-01-09 ENCOUNTER — OFFICE VISIT (OUTPATIENT)
Dept: SURGERY | Age: 58
End: 2018-01-09

## 2018-01-09 VITALS
BODY MASS INDEX: 28.7 KG/M2 | HEART RATE: 99 BPM | SYSTOLIC BLOOD PRESSURE: 142 MMHG | TEMPERATURE: 98.7 F | HEIGHT: 71 IN | OXYGEN SATURATION: 94 % | WEIGHT: 205 LBS | DIASTOLIC BLOOD PRESSURE: 90 MMHG

## 2018-01-09 DIAGNOSIS — C78.7 COLON CANCER METASTASIZED TO LIVER (HCC): ICD-10-CM

## 2018-01-09 DIAGNOSIS — C18.2 MALIGNANT NEOPLASM OF ASCENDING COLON (HCC): Primary | ICD-10-CM

## 2018-01-09 DIAGNOSIS — C18.9 COLON CANCER METASTASIZED TO LIVER (HCC): ICD-10-CM

## 2018-01-09 PROCEDURE — 99245 OFF/OP CONSLTJ NEW/EST HI 55: CPT | Performed by: SURGERY

## 2018-01-09 PROCEDURE — 99215 OFFICE O/P EST HI 40 MIN: CPT | Performed by: SURGERY

## 2018-01-09 RX ORDER — CIPROFLOXACIN 2 MG/ML
400 INJECTION, SOLUTION INTRAVENOUS
Status: CANCELLED | OUTPATIENT
Start: 2018-01-09 | End: 2018-01-09

## 2018-01-09 RX ORDER — SODIUM CHLORIDE 0.9 % (FLUSH) 0.9 %
10 SYRINGE (ML) INJECTION PRN
Status: CANCELLED | OUTPATIENT
Start: 2018-01-09

## 2018-01-09 RX ORDER — SODIUM CHLORIDE 0.9 % (FLUSH) 0.9 %
10 SYRINGE (ML) INJECTION EVERY 12 HOURS SCHEDULED
Status: CANCELLED | OUTPATIENT
Start: 2018-01-09

## 2018-01-09 NOTE — LETTER
Lymphadenopathy: He has no bilateral cervical, supraclavicular or axillary adenopathy. Neurological: Grossly intact. Skin: Skin is warm and dry. Psychiatric: He has a normal mood and affect. appropriate judgment and thought process    MRI:  7 discrete hepatic lesions with largest lesion in the lateral   segment of the left lobe measuring 13 mm and the others measuring   under 1 cm. Signal characteristics and enhancement pattern are   inconsistent with  simple cysts or hemangiomas. Rather, findings   are consistent with  cystic (mucinous) or necrotic metastases. Assessment/Plan:  1. Malignant neoplasm of ascending colon ([facility] [address]     2. Colon cancer metastasized to liver [facility])       I reviewed all imaging studies myself, with radiologist and with patient. I informed the patient about involvement of liver by colon cancer and management options available. Patient completed 12 cycles of chemo and appears to have good response. Controversies in surgical management of stage IV colon cancer discussed in detail. Patient appears to be very healthy and active and thus goal will be to treat all the metastatic locations. Presence of any peritoneal or omental disease is going to change the approach we will undertake for liver lesions and lung lesion. Thus I recommended to proceed with diagnostic laparoscopy, likely colectomy and assessment of liver lesions with possible liver resection of some lesions at the same time. Discomfort and alternatives of surgery discussed in detail and patient wishes to proceed with surgery. Discussed about management plan with Dr. Dee Dee Larson. He would like to have surgery in March and we will coordinate with Dr. Dee Dee Larson. I recommended patient to reach out to Dr. Janie Salmeron for some more chemo. All the questions of the patient are answered to his apparent satisfaction. Patient verbalized understanding of the management plan.

## 2018-01-09 NOTE — PROGRESS NOTES
cream Apply a thick layer over the port 30 minutes prior to treatment and cover with saran wrap. 1 Tube 5    prochlorperazine (COMPAZINE) 5 MG tablet Take 1 tablet by mouth every 6 hours as needed for Nausea 120 tablet 3    DHEA 25 MG CAPS Take by mouth      testosterone (ANDROGEL; TESTIM) 50 MG/5GM (1%) GEL 1% gel Place 5 g onto the skin      FERROUS SULFATE PO Take by mouth       No current facility-administered medications for this visit. Review of Systems: See HPI  All other systems reviewed and are negative. Vitals:    01/09/18 1115 01/09/18 1118   BP: (!) 143/92 (!) 142/90   Pulse: 99    Temp: 98.7 °F (37.1 °C)    TempSrc: Oral    SpO2: 94%    Weight: 205 lb (93 kg)    Height: 5' 11\" (1.803 m)        Physical Exam:   Constitutional: He is oriented to person, place, and time. He appears well-developed and well-nourished. No distress. HENT: Moist mucus membranes  Head: Normocephalic and atraumatic. Eyes: EOM are normal. Pupils are equal, round, and no icterus. Neck: Normal range of motion. Neck supple. No thyromegaly present. Cardiovascular: Normal rate and regular rhythm by peripheral pulses. Pulmonary/Chest: No respiratory distress. bilateral symmetrical chest rise  Abdominal: Soft. He exhibits no distension and no mass. There is no hepatosplenomegaly. No tenderness. Extremities: He exhibits no edema. Lymphadenopathy: He has no bilateral cervical, supraclavicular or axillary adenopathy. Neurological: Grossly intact. Skin: Skin is warm and dry. Psychiatric: He has a normal mood and affect. appropriate judgment and thought process    MRI:  7 discrete hepatic lesions with largest lesion in the lateral   segment of the left lobe measuring 13 mm and the others measuring   under 1 cm. Signal characteristics and enhancement pattern are   inconsistent with  simple cysts or hemangiomas. Rather, findings   are consistent with  cystic (mucinous) or necrotic metastases. Assessment/Plan:  1. Malignant neoplasm of ascending colon ([facility] [address]     2. Colon cancer metastasized to liver [facility])       I reviewed all imaging studies myself, with radiologist and with patient. I informed the patient about involvement of liver by colon cancer and management options available. Patient completed 12 cycles of chemo and appears to have good response. Controversies in surgical management of stage IV colon cancer discussed in detail. Patient appears to be very healthy and active and thus goal will be to treat all the metastatic locations. Presence of any peritoneal or omental disease is going to change the approach we will undertake for liver lesions and lung lesion. Thus I recommended to proceed with diagnostic laparoscopy, likely colectomy and assessment of liver lesions with possible liver resection of some lesions at the same time. Discomfort and alternatives of surgery discussed in detail and patient wishes to proceed with surgery. Discussed about management plan with Dr. Rubina Wooten. He would like to have surgery in March and we will coordinate with Dr. Rubina Wooten. I recommended patient to reach out to Dr. Alexa Albrecht for some more chemo. All the questions of the patient are answered to his apparent satisfaction. Patient verbalized understanding of the management plan.      Osman Gilmore MD  Surgical Oncologist

## 2018-01-09 NOTE — LETTER
[address] [place] [address] Toby of [facility]  Derik Frances  Ph  [phone]  Fax  [phone]    Dion Runner, MD Lonny London, MD      Instructions for Admission Following Surgery    Patient Name __Andrew [address]:  [facility] of [address] [place], [place]    Date of Surgery  __03/19/2018 _____________    Time to arrive ____530am_______ at the Main entrance    [address] CAREFULLY BEFORE SURGERY    1. Do NOT eat or drink anything after midnight the evening before surgery. Food or drink intake of any kind will result in the cancellation of surgery. 2. Bowel prep #2 see attachment     3. Stop all Blood Thinning medications AND Aniti inflamitories; Aspirin, Coumadin & Plavix ect. 7 days prior to surgery if you are taking any of these. 4. Have a pre-op done with your primary care doctor    Any paperwork needing completion for either your employer or insurance company can be faxed, mailed or dropped off at our office to my attention. Please allow 5 business days for the paperwork to be completed. NOTE: Due to HIPPA policy, completed paperwork can not be faxed and per [facility] of 7-3[facility]94, Fees for form completion may apply. If you have any questions, please feel free to call me at the number above.     Rubina Giron LPN  Assistant to  Dion Runner, MD Lonny London, MD

## 2018-01-09 NOTE — PROGRESS NOTES
belinda wrap., Disp: 1 Tube, Rfl: 5    prochlorperazine (COMPAZINE) 5 MG tablet, Take 1 tablet by mouth every 6 hours as needed for Nausea, Disp: 120 tablet, Rfl: 3    DHEA 25 MG CAPS, Take by mouth, Disp: , Rfl:     testosterone (ANDROGEL; TESTIM) 50 MG/5GM (1%) GEL 1% gel, Place 5 g onto the skin, Disp: , Rfl:     FERROUS SULFATE PO, Take by mouth, Disp: , Rfl:   Allergies   Allergen Reactions    Penicillins Hives     1964     Past Surgical History:   Procedure Laterality Date    COLONOSCOPY      CYST REMOVAL  1973    Behind right knee    LIVER BIOPSY Right 10/05/2017    phalen MD at [facility] in specials as outpt    TUNNELED VENOUS PORT PLACEMENT Left 05/30/2017     Family History   Problem Relation Age of Onset    Cancer Father      Skin (melanoma)    Mult Sclerosis Mother     No Known Problems Brother        Social History:   Social History   Substance Use Topics    Smoking status: Never Smoker    Smokeless tobacco: Never Used    Alcohol use 1.2 oz/week     2 Cans of beer per week      Comment: daily       Tobacco cessation counseling provided as appropriate. REVIEW OF SYSTEMS:    Pertinent positives and negatives are mentioned in the HPI. Otherwise, all other systems were reviewed and negative. Objective:     Physical Exam   There were no vitals taken for this visit. Constitutional: Appears well-developed and well-nourished. Grooming appropriate. No gross deformities. There is no height or weight on file to calculate BMI. Eyes: No scleral icterus. Conjunctiva/lids normal. Vision intact grossly. Pupils equal/symmetric, reactive bilaterally. ENT: External ears/nose without defect, scars, or masses. Hearing grossly intact. No facial deformity. Lips normal, normal dentition. Neck: No masses. Trachea midline. No crepitus. Thyroid not enlarged. Cardiovascular: Normal rate. No peripheral edema. Abdominal aorta normal size to palpation. Pulmonary/Chest: Effort normal. No respiratory distress.

## 2018-01-22 ENCOUNTER — TELEPHONE (OUTPATIENT)
Dept: SURGERY | Age: 58
End: 2018-01-22

## 2018-02-05 ENCOUNTER — TELEPHONE (OUTPATIENT)
Dept: SURGERY | Age: 58
End: 2018-02-05

## 2018-02-05 NOTE — TELEPHONE ENCOUNTER
Patient called and said he will be finished with Chemo on Wednesday 2/7/18. He would like to know if we can schedule surgery. Please advise.

## 2018-03-09 ENCOUNTER — HOSPITAL ENCOUNTER (OUTPATIENT)
Dept: PREADMISSION TESTING | Age: 58
Discharge: HOME | End: 2018-03-09
Attending: SURGERY | Admitting: SURGERY

## 2018-03-09 VITALS
WEIGHT: 199 LBS | BODY MASS INDEX: 27.86 KG/M2 | HEIGHT: 71 IN | HEART RATE: 93 BPM | TEMPERATURE: 99 F | OXYGEN SATURATION: 97 % | SYSTOLIC BLOOD PRESSURE: 120 MMHG | DIASTOLIC BLOOD PRESSURE: 74 MMHG | RESPIRATION RATE: 14 BRPM

## 2018-03-09 LAB
ABO/RH: NORMAL
ANTIBODY SCREEN: NORMAL

## 2018-03-13 ENCOUNTER — TELEPHONE (OUTPATIENT)
Dept: SURGERY | Age: 58
End: 2018-03-13

## 2018-03-13 DIAGNOSIS — C18.9 COLON CANCER METASTASIZED TO LIVER (HCC): Primary | ICD-10-CM

## 2018-03-13 DIAGNOSIS — C78.7 COLON CANCER METASTASIZED TO LIVER (HCC): Primary | ICD-10-CM

## 2018-03-13 RX ORDER — NEOMYCIN SULFATE 500 MG/1
TABLET ORAL
Qty: 6 TABLET | Refills: 0 | Status: ON HOLD | OUTPATIENT
Start: 2018-03-13 | End: 2018-03-22 | Stop reason: HOSPADM

## 2018-03-13 RX ORDER — METRONIDAZOLE 500 MG/1
TABLET ORAL
Qty: 3 TABLET | Refills: 0 | Status: ON HOLD | OUTPATIENT
Start: 2018-03-13 | End: 2018-03-22 | Stop reason: HOSPADM

## 2018-03-13 NOTE — TELEPHONE ENCOUNTER
Ghassan at [facility] called to confirm if procedure being authorized is the same as the surgery authorized for 3/19 with Dr. Monica Mejia. Also would like to know if 2 contiguous hepatic segments will be preserved. What is the discharge plan post procedure.     Requests return call  Ghassan  [phone]

## 2018-03-16 ENCOUNTER — TELEPHONE (OUTPATIENT)
Dept: SURGERY | Age: 58
End: 2018-03-16

## 2018-03-19 PROBLEM — C18.2 CANCER OF ASCENDING COLON (HCC): Status: ACTIVE | Noted: 2018-03-19

## 2018-04-03 ENCOUNTER — TELEPHONE (OUTPATIENT)
Dept: SURGERY | Age: 58
End: 2018-04-03

## 2018-04-03 ENCOUNTER — OFFICE VISIT (OUTPATIENT)
Dept: SURGERY | Age: 58
End: 2018-04-03

## 2018-04-03 VITALS
TEMPERATURE: 98.6 F | DIASTOLIC BLOOD PRESSURE: 89 MMHG | HEART RATE: 83 BPM | BODY MASS INDEX: 28.17 KG/M2 | SYSTOLIC BLOOD PRESSURE: 134 MMHG | OXYGEN SATURATION: 100 % | WEIGHT: 202 LBS

## 2018-04-03 DIAGNOSIS — C18.2 CANCER OF ASCENDING COLON (HCC): Primary | ICD-10-CM

## 2018-04-03 DIAGNOSIS — C78.7 COLON CANCER METASTASIZED TO LIVER (HCC): ICD-10-CM

## 2018-04-03 DIAGNOSIS — Z98.890 POSTOPERATIVE STATE: ICD-10-CM

## 2018-04-03 DIAGNOSIS — C18.9 COLON CANCER METASTASIZED TO LIVER (HCC): ICD-10-CM

## 2018-04-03 PROCEDURE — 99024 POSTOP FOLLOW-UP VISIT: CPT | Performed by: SURGERY

## 2018-04-06 ENCOUNTER — TELEPHONE (OUTPATIENT)
Dept: SURGERY | Age: 58
End: 2018-04-06

## 2018-04-06 ENCOUNTER — HOSPITAL ENCOUNTER (OUTPATIENT)
Dept: CT IMAGING | Age: 58
Discharge: HOME | End: 2018-04-06
Attending: SURGERY | Admitting: SURGERY

## 2018-04-06 DIAGNOSIS — C78.7 COLON CANCER METASTASIZED TO LIVER (HCC): ICD-10-CM

## 2018-04-06 DIAGNOSIS — C18.2 MALIGNANT NEOPLASM OF ASCENDING COLON (HCC): ICD-10-CM

## 2018-04-06 DIAGNOSIS — C18.2 CANCER OF ASCENDING COLON (HCC): ICD-10-CM

## 2018-04-06 DIAGNOSIS — C18.9 COLON CANCER METASTASIZED TO LIVER (HCC): ICD-10-CM

## 2018-04-11 ENCOUNTER — TELEPHONE (OUTPATIENT)
Dept: SURGERY | Age: 58
End: 2018-04-11

## 2018-04-11 ENCOUNTER — OFFICE VISIT (OUTPATIENT)
Dept: CARDIOTHORACIC SURGERY | Age: 58
End: 2018-04-11

## 2018-04-11 VITALS
OXYGEN SATURATION: 98 % | HEART RATE: 87 BPM | BODY MASS INDEX: 28.56 KG/M2 | WEIGHT: 204 LBS | DIASTOLIC BLOOD PRESSURE: 96 MMHG | SYSTOLIC BLOOD PRESSURE: 140 MMHG | TEMPERATURE: 98.5 F | HEIGHT: 71 IN

## 2018-04-11 DIAGNOSIS — C78.7 COLON CANCER METASTASIZED TO LIVER (HCC): Primary | ICD-10-CM

## 2018-04-11 DIAGNOSIS — C18.2 MALIGNANT NEOPLASM OF ASCENDING COLON (HCC): ICD-10-CM

## 2018-04-11 DIAGNOSIS — C78.01 MALIGNANT NEOPLASM METASTATIC TO RIGHT LUNG (HCC): ICD-10-CM

## 2018-04-11 DIAGNOSIS — C18.9 COLON CANCER METASTASIZED TO LIVER (HCC): Primary | ICD-10-CM

## 2018-04-11 PROCEDURE — 99213 OFFICE O/P EST LOW 20 MIN: CPT | Performed by: THORACIC SURGERY (CARDIOTHORACIC VASCULAR SURGERY)

## 2018-04-12 ENCOUNTER — HOSPITAL ENCOUNTER (OUTPATIENT)
Dept: PREADMISSION TESTING | Age: 58
Discharge: HOME | End: 2018-04-12
Attending: THORACIC SURGERY (CARDIOTHORACIC VASCULAR SURGERY) | Admitting: THORACIC SURGERY (CARDIOTHORACIC VASCULAR SURGERY)

## 2018-04-12 VITALS
HEIGHT: 71 IN | RESPIRATION RATE: 14 BRPM | TEMPERATURE: 97.6 F | WEIGHT: 205 LBS | OXYGEN SATURATION: 99 % | BODY MASS INDEX: 28.7 KG/M2 | HEART RATE: 83 BPM | DIASTOLIC BLOOD PRESSURE: 90 MMHG | SYSTOLIC BLOOD PRESSURE: 146 MMHG

## 2018-04-12 LAB
ABO/RH: NORMAL
ALBUMIN SERPL-MCNC: 4 G/DL (ref 3.4–5)
ALP BLD-CCNC: 77 U/L (ref 40–129)
ALT SERPL-CCNC: 23 U/L (ref 10–40)
ANION GAP SERPL CALCULATED.3IONS-SCNC: 10 MMOL/L (ref 3–16)
ANTIBODY SCREEN: NORMAL
APTT: 32.5 SEC (ref 24.1–34.9)
AST SERPL-CCNC: 30 U/L (ref 15–37)
BILIRUB SERPL-MCNC: 0.8 MG/DL (ref 0–1)
BILIRUBIN DIRECT: <0.2 MG/DL (ref 0–0.3)
BILIRUBIN URINE: NEGATIVE
BILIRUBIN, INDIRECT: NORMAL MG/DL (ref 0–1)
BLOOD, URINE: NEGATIVE
BUN BLDV-MCNC: 8 MG/DL (ref 7–20)
CALCIUM SERPL-MCNC: 9.6 MG/DL (ref 8.3–10.6)
CHLORIDE BLD-SCNC: 102 MMOL/L (ref 99–110)
CLARITY: CLEAR
CO2: 28 MMOL/L (ref 21–32)
COLOR: YELLOW
CREAT SERPL-MCNC: 0.9 MG/DL (ref 0.9–1.3)
EKG ATRIAL RATE: 73 BPM
EKG DIAGNOSIS: NORMAL
EKG P AXIS: 52 DEGREES
EKG P-R INTERVAL: 164 MS
EKG Q-T INTERVAL: 388 MS
EKG QRS DURATION: 90 MS
EKG QTC CALCULATION (BAZETT): 427 MS
EKG R AXIS: 5 DEGREES
EKG T AXIS: 35 DEGREES
EKG VENTRICULAR RATE: 73 BPM
GFR AFRICAN AMERICAN: >60
GFR NON-AFRICAN AMERICAN: >60
GLUCOSE BLD-MCNC: 105 MG/DL (ref 70–99)
GLUCOSE URINE: NEGATIVE MG/DL
HCT VFR BLD CALC: 38.4 % (ref 40.5–52.5)
HEMOGLOBIN: 13 G/DL (ref 13.5–17.5)
INR BLD: 0.96 (ref 0.85–1.15)
KETONES, URINE: NEGATIVE MG/DL
LEUKOCYTE ESTERASE, URINE: NEGATIVE
MAGNESIUM: 2 MG/DL (ref 1.8–2.4)
MCH RBC QN AUTO: 33.8 PG (ref 26–34)
MCHC RBC AUTO-ENTMCNC: 34 G/DL (ref 31–36)
MCV RBC AUTO: 99.4 FL (ref 80–100)
MICROSCOPIC EXAMINATION: NORMAL
NITRITE, URINE: NEGATIVE
PDW BLD-RTO: 14.2 % (ref 12.4–15.4)
PH UA: 6
PLATELET # BLD: 115 K/UL (ref 135–450)
PMV BLD AUTO: 6 FL (ref 5–10.5)
POTASSIUM SERPL-SCNC: 5 MMOL/L (ref 3.5–5.1)
PROTEIN UA: NEGATIVE MG/DL
PROTHROMBIN TIME: 10.8 SEC (ref 9.6–13)
RBC # BLD: 3.86 M/UL (ref 4.2–5.9)
SODIUM BLD-SCNC: 140 MMOL/L (ref 136–145)
SPECIFIC GRAVITY UA: 1.02
TOTAL PROTEIN: 7.9 G/DL (ref 6.4–8.2)
URINE TYPE: NORMAL
UROBILINOGEN, URINE: 0.2 E.U./DL
WBC # BLD: 5.3 K/UL (ref 4–11)

## 2018-04-12 PROCEDURE — 93010 ELECTROCARDIOGRAM REPORT: CPT | Performed by: INTERNAL MEDICINE

## 2018-04-12 RX ORDER — SODIUM CHLORIDE 9 MG/ML
INJECTION, SOLUTION INTRAVENOUS CONTINUOUS
Status: CANCELLED | OUTPATIENT
Start: 2018-04-15

## 2018-04-12 RX ORDER — VITAMIN B COMPLEX
1 CAPSULE ORAL DAILY
COMMUNITY
End: 2021-02-08

## 2018-04-13 ENCOUNTER — TELEPHONE (OUTPATIENT)
Dept: CARDIOTHORACIC SURGERY | Age: 58
End: 2018-04-13

## 2018-04-13 LAB — URINE CULTURE, ROUTINE: NORMAL

## 2018-04-16 PROBLEM — R91.8 RIGHT LOWER LOBE LUNG MASS: Status: ACTIVE | Noted: 2018-04-16

## 2018-04-17 PROBLEM — R91.8 LUNG MASS: Status: ACTIVE | Noted: 2018-04-17

## 2018-04-18 ENCOUNTER — TELEPHONE (OUTPATIENT)
Dept: SURGERY | Age: 58
End: 2018-04-18

## 2018-04-20 ENCOUNTER — TELEPHONE (OUTPATIENT)
Dept: CARDIOTHORACIC SURGERY | Age: 58
End: 2018-04-20

## 2018-04-23 ENCOUNTER — TELEPHONE (OUTPATIENT)
Dept: CARDIOTHORACIC SURGERY | Age: 58
End: 2018-04-23

## 2018-04-27 DIAGNOSIS — K76.9 LIVER LESION: Primary | ICD-10-CM

## 2018-05-02 ENCOUNTER — OFFICE VISIT (OUTPATIENT)
Dept: CARDIOTHORACIC SURGERY | Age: 58
End: 2018-05-02

## 2018-05-02 VITALS
WEIGHT: 208 LBS | SYSTOLIC BLOOD PRESSURE: 126 MMHG | DIASTOLIC BLOOD PRESSURE: 78 MMHG | OXYGEN SATURATION: 98 % | HEIGHT: 71 IN | HEART RATE: 86 BPM | TEMPERATURE: 98.6 F | BODY MASS INDEX: 29.12 KG/M2

## 2018-05-02 DIAGNOSIS — R91.8 RIGHT LOWER LOBE LUNG MASS: ICD-10-CM

## 2018-05-02 DIAGNOSIS — R91.8 LUNG MASS: Primary | ICD-10-CM

## 2018-05-02 PROCEDURE — 99024 POSTOP FOLLOW-UP VISIT: CPT | Performed by: THORACIC SURGERY (CARDIOTHORACIC VASCULAR SURGERY)

## 2018-05-08 ENCOUNTER — TELEPHONE (OUTPATIENT)
Dept: SURGERY | Age: 58
End: 2018-05-08

## 2018-05-09 ENCOUNTER — TELEPHONE (OUTPATIENT)
Dept: SURGERY | Age: 58
End: 2018-05-09

## 2018-05-11 ENCOUNTER — HOSPITAL ENCOUNTER (OUTPATIENT)
Dept: MRI IMAGING | Age: 58
Discharge: HOME | End: 2018-05-11
Attending: SURGERY | Admitting: SURGERY

## 2018-05-11 DIAGNOSIS — K76.9 LIVER DISEASE: ICD-10-CM

## 2018-05-11 DIAGNOSIS — K76.9 LIVER LESION: ICD-10-CM

## 2018-05-14 ENCOUNTER — TELEPHONE (OUTPATIENT)
Dept: SURGERY | Age: 58
End: 2018-05-14

## 2018-06-04 ENCOUNTER — TELEPHONE (OUTPATIENT)
Dept: SURGERY | Age: 58
End: 2018-06-04

## 2018-06-12 ENCOUNTER — TELEPHONE (OUTPATIENT)
Dept: SURGERY | Age: 58
End: 2018-06-12

## 2018-06-12 DIAGNOSIS — C18.9 COLON CANCER METASTASIZED TO LIVER (HCC): Primary | ICD-10-CM

## 2018-06-12 DIAGNOSIS — C18.2 MALIGNANT NEOPLASM OF ASCENDING COLON (HCC): ICD-10-CM

## 2018-06-12 DIAGNOSIS — C78.7 COLON CANCER METASTASIZED TO LIVER (HCC): Primary | ICD-10-CM

## 2018-06-15 ENCOUNTER — TELEPHONE (OUTPATIENT)
Dept: SURGERY | Age: 58
End: 2018-06-15

## 2018-06-29 ENCOUNTER — HOSPITAL ENCOUNTER (OUTPATIENT)
Dept: CT IMAGING | Age: 58
Discharge: HOME | End: 2018-06-29
Admitting: SURGERY

## 2018-06-29 DIAGNOSIS — C18.2 MALIGNANT NEOPLASM OF ASCENDING COLON (HCC): ICD-10-CM

## 2018-06-29 DIAGNOSIS — C78.7 COLON CANCER METASTASIZED TO LIVER (HCC): ICD-10-CM

## 2018-06-29 DIAGNOSIS — C18.9 COLON CANCER METASTASIZED TO LIVER (HCC): ICD-10-CM

## 2018-06-29 DIAGNOSIS — C18.9 MALIGNANT NEOPLASM OF COLON (HCC): ICD-10-CM

## 2018-07-03 ENCOUNTER — OFFICE VISIT (OUTPATIENT)
Dept: SURGERY | Age: 58
End: 2018-07-03

## 2018-07-03 VITALS
HEART RATE: 83 BPM | TEMPERATURE: 98.5 F | OXYGEN SATURATION: 98 % | DIASTOLIC BLOOD PRESSURE: 92 MMHG | SYSTOLIC BLOOD PRESSURE: 135 MMHG | BODY MASS INDEX: 29.26 KG/M2 | WEIGHT: 209 LBS | HEIGHT: 71 IN

## 2018-07-03 DIAGNOSIS — C18.9 COLON CANCER METASTASIZED TO LIVER (HCC): Primary | ICD-10-CM

## 2018-07-03 DIAGNOSIS — C78.7 COLON CANCER METASTASIZED TO LIVER (HCC): Primary | ICD-10-CM

## 2018-07-03 PROCEDURE — 99214 OFFICE O/P EST MOD 30 MIN: CPT | Performed by: SURGERY

## 2018-09-17 ENCOUNTER — TELEPHONE (OUTPATIENT)
Dept: SURGERY | Age: 58
End: 2018-09-17

## 2018-09-17 DIAGNOSIS — C18.9 COLON CANCER METASTASIZED TO LIVER (HCC): Primary | ICD-10-CM

## 2018-09-17 DIAGNOSIS — C78.7 COLON CANCER METASTASIZED TO LIVER (HCC): Primary | ICD-10-CM

## 2018-09-17 NOTE — TELEPHONE ENCOUNTER
The patient is scheduled to see Dr. Cecilia Hughes on 10-2-2018. The patient stated that he was told to have scans done prior to coming in for this appointment. The patient would like to have the scans scheduled. What does he need to do? Please call.

## 2018-09-18 ENCOUNTER — HOSPITAL ENCOUNTER (OUTPATIENT)
Dept: MRI IMAGING | Age: 58
Discharge: HOME | End: 2018-09-18
Payer: COMMERCIAL

## 2018-09-18 DIAGNOSIS — C78.7 COLON CANCER METASTASIZED TO LIVER (HCC): ICD-10-CM

## 2018-09-18 DIAGNOSIS — C18.9 COLON CANCER METASTASIZED TO LIVER (HCC): ICD-10-CM

## 2018-09-18 LAB
A/G RATIO: 1.5 (ref 1.1–2.2)
ALBUMIN SERPL-MCNC: 4.5 G/DL (ref 3.4–5)
ALP BLD-CCNC: 66 U/L (ref 40–129)
ALT SERPL-CCNC: 59 U/L (ref 10–40)
ANION GAP SERPL CALCULATED.3IONS-SCNC: 13 MMOL/L (ref 3–16)
AST SERPL-CCNC: 56 U/L (ref 15–37)
BASOPHILS ABSOLUTE: 0 K/UL (ref 0–0.2)
BASOPHILS RELATIVE PERCENT: 0.7 %
BILIRUB SERPL-MCNC: 1.2 MG/DL (ref 0–1)
BILIRUBIN DIRECT: 0.4 MG/DL (ref 0–0.3)
BILIRUBIN, INDIRECT: 0.8 MG/DL (ref 0–1)
BUN BLDV-MCNC: 9 MG/DL (ref 7–20)
CALCIUM SERPL-MCNC: 9.4 MG/DL (ref 8.3–10.6)
CHLORIDE BLD-SCNC: 103 MMOL/L (ref 99–110)
CO2: 26 MMOL/L (ref 21–32)
CREAT SERPL-MCNC: 0.9 MG/DL (ref 0.9–1.3)
EOSINOPHILS ABSOLUTE: 0.1 K/UL (ref 0–0.6)
EOSINOPHILS RELATIVE PERCENT: 1.6 %
GFR AFRICAN AMERICAN: >60
GFR NON-AFRICAN AMERICAN: >60
GLOBULIN: 3 G/DL
GLUCOSE BLD-MCNC: 93 MG/DL (ref 70–99)
HCT VFR BLD CALC: 46 % (ref 40.5–52.5)
HEMOGLOBIN: 15.6 G/DL (ref 13.5–17.5)
LYMPHOCYTES ABSOLUTE: 1.6 K/UL (ref 1–5.1)
LYMPHOCYTES RELATIVE PERCENT: 28.4 %
MCH RBC QN AUTO: 34.2 PG (ref 26–34)
MCHC RBC AUTO-ENTMCNC: 34 G/DL (ref 31–36)
MCV RBC AUTO: 100.7 FL (ref 80–100)
MONOCYTES ABSOLUTE: 0.5 K/UL (ref 0–1.3)
MONOCYTES RELATIVE PERCENT: 9.3 %
NEUTROPHILS ABSOLUTE: 3.4 K/UL (ref 1.7–7.7)
NEUTROPHILS RELATIVE PERCENT: 60 %
PDW BLD-RTO: 13.7 % (ref 12.4–15.4)
PLATELET # BLD: 110 K/UL (ref 135–450)
PMV BLD AUTO: 7.3 FL (ref 5–10.5)
POTASSIUM SERPL-SCNC: 4.3 MMOL/L (ref 3.5–5.1)
RBC # BLD: 4.56 M/UL (ref 4.2–5.9)
SODIUM BLD-SCNC: 142 MMOL/L (ref 136–145)
TOTAL PROTEIN: 7.5 G/DL (ref 6.4–8.2)
WBC # BLD: 5.7 K/UL (ref 4–11)

## 2018-09-18 PROCEDURE — A9581 GADOXETATE DISODIUM INJ: HCPCS | Performed by: SURGERY

## 2018-09-18 PROCEDURE — 6360000004 HC RX CONTRAST MEDICATION: Performed by: SURGERY

## 2018-09-18 PROCEDURE — 74183 MRI ABD W/O CNTR FLWD CNTR: CPT

## 2018-09-18 RX ADMIN — GADOXETATE DISODIUM 10 ML: 181.43 INJECTION, SOLUTION INTRAVENOUS at 15:04

## 2018-09-20 ENCOUNTER — TELEPHONE (OUTPATIENT)
Dept: SURGERY | Age: 58
End: 2018-09-20

## 2018-09-20 NOTE — PROGRESS NOTES
patient about MRI findings and reviewed with him images. No obvious progression of disease or new lesions. Options at this point discussed including just observation, staying on maintenance chemo versus liver directed therapy's. Patient preferred to go with observation and I agree with it. Importance of calling us if patient develops any new symptoms emphasized. I'm also going to discuss about him at GI cancer tumor board and will let patient know if there is any change in recommendations. We will follow his next imaging studies. Multiple questions of the patient were answered to his apparent satisfaction.

## 2018-09-20 NOTE — TELEPHONE ENCOUNTER
Patient is requesting the results of his MRI completed Tuesday 9/18. Please advise. Thank you!     Alicia Loyd [phone] (home)

## 2018-09-21 ENCOUNTER — OFFICE VISIT (OUTPATIENT)
Dept: SURGERY | Age: 58
End: 2018-09-21

## 2018-09-21 VITALS
WEIGHT: 221 LBS | DIASTOLIC BLOOD PRESSURE: 107 MMHG | OXYGEN SATURATION: 96 % | BODY MASS INDEX: 30.94 KG/M2 | TEMPERATURE: 98.4 F | HEART RATE: 84 BPM | SYSTOLIC BLOOD PRESSURE: 150 MMHG | HEIGHT: 71 IN

## 2018-09-21 DIAGNOSIS — C18.9 COLON CANCER METASTASIZED TO LIVER (HCC): Primary | ICD-10-CM

## 2018-09-21 DIAGNOSIS — C78.7 COLON CANCER METASTASIZED TO LIVER (HCC): Primary | ICD-10-CM

## 2018-09-21 PROCEDURE — 99213 OFFICE O/P EST LOW 20 MIN: CPT | Performed by: SURGERY

## 2018-09-26 PROBLEM — R05.9 COUGH: Status: RESOLVED | Noted: 2017-05-10 | Resolved: 2018-09-26

## 2018-09-26 PROBLEM — Z51.11 ENCOUNTER FOR ANTINEOPLASTIC CHEMOTHERAPY: Status: RESOLVED | Noted: 2017-06-27 | Resolved: 2018-09-26

## 2018-12-12 ENCOUNTER — TELEPHONE (OUTPATIENT)
Dept: SURGERY | Age: 58
End: 2018-12-12

## 2018-12-12 NOTE — TELEPHONE ENCOUNTER
The patient has an appointment to see Dr. Sy Alejandre 2-8-2019, but stated that he was told to have scans done prior to the end of the year. The patient would like to move forward with scheduling the scans. Please call.

## 2018-12-20 DIAGNOSIS — C18.9 COLON CANCER METASTASIZED TO LIVER (HCC): Primary | ICD-10-CM

## 2018-12-20 DIAGNOSIS — C78.7 COLON CANCER METASTASIZED TO LIVER (HCC): Primary | ICD-10-CM

## 2018-12-21 ENCOUNTER — TELEPHONE (OUTPATIENT)
Dept: SURGERY | Age: 58
End: 2018-12-21

## 2018-12-21 NOTE — TELEPHONE ENCOUNTER
Patient verbalized an understanding that MRI is scheduled for 1/8/19 at 11:30 with an 11:00 arrival.

## 2019-01-08 ENCOUNTER — HOSPITAL ENCOUNTER (OUTPATIENT)
Dept: MRI IMAGING | Age: 59
Discharge: HOME | End: 2019-01-08
Payer: COMMERCIAL

## 2019-01-08 DIAGNOSIS — C78.7 COLON CANCER METASTASIZED TO LIVER (HCC): ICD-10-CM

## 2019-01-08 DIAGNOSIS — C18.9 COLON CANCER METASTASIZED TO LIVER (HCC): ICD-10-CM

## 2019-01-08 PROCEDURE — 6360000004 HC RX CONTRAST MEDICATION: Performed by: FAMILY MEDICINE

## 2019-01-08 PROCEDURE — 74183 MRI ABD W/O CNTR FLWD CNTR: CPT

## 2019-01-08 PROCEDURE — A9581 GADOXETATE DISODIUM INJ: HCPCS | Performed by: FAMILY MEDICINE

## 2019-01-08 RX ADMIN — GADOXETATE DISODIUM 10 ML: 181.43 INJECTION, SOLUTION INTRAVENOUS at 13:43

## 2019-01-10 ENCOUNTER — TELEPHONE (OUTPATIENT)
Dept: SURGERY | Age: 59
End: 2019-01-10

## 2019-01-11 ENCOUNTER — OFFICE VISIT (OUTPATIENT)
Dept: SURGERY | Age: 59
End: 2019-01-11
Payer: COMMERCIAL

## 2019-01-11 VITALS
SYSTOLIC BLOOD PRESSURE: 155 MMHG | TEMPERATURE: 98 F | BODY MASS INDEX: 31.66 KG/M2 | HEART RATE: 78 BPM | OXYGEN SATURATION: 98 % | WEIGHT: 227 LBS | DIASTOLIC BLOOD PRESSURE: 96 MMHG

## 2019-01-11 DIAGNOSIS — C18.9 COLON CANCER METASTASIZED TO LIVER (HCC): Primary | ICD-10-CM

## 2019-01-11 DIAGNOSIS — C78.7 COLON CANCER METASTASIZED TO LIVER (HCC): Primary | ICD-10-CM

## 2019-01-11 PROCEDURE — 99213 OFFICE O/P EST LOW 20 MIN: CPT | Performed by: SURGERY

## 2020-06-22 ENCOUNTER — HOSPITAL ENCOUNTER (OUTPATIENT)
Dept: CT IMAGING | Age: 60
Discharge: HOME | End: 2020-06-22
Payer: COMMERCIAL

## 2020-06-22 LAB
BUN BLDV-MCNC: 9 MG/DL (ref 7–20)
CREAT SERPL-MCNC: 1 MG/DL (ref 0.8–1.3)
GFR AFRICAN AMERICAN: >60
GFR NON-AFRICAN AMERICAN: >60

## 2020-06-22 PROCEDURE — 82565 ASSAY OF CREATININE: CPT

## 2020-06-22 PROCEDURE — 36415 COLL VENOUS BLD VENIPUNCTURE: CPT

## 2020-06-22 PROCEDURE — 74177 CT ABD & PELVIS W/CONTRAST: CPT

## 2020-06-22 PROCEDURE — 6360000004 HC RX CONTRAST MEDICATION: Performed by: INTERNAL MEDICINE

## 2020-06-22 PROCEDURE — 84520 ASSAY OF UREA NITROGEN: CPT

## 2020-06-22 RX ADMIN — IOPAMIDOL 75 ML: 755 INJECTION, SOLUTION INTRAVENOUS at 17:18

## 2020-06-22 RX ADMIN — IOHEXOL 50 ML: 240 INJECTION, SOLUTION INTRATHECAL; INTRAVASCULAR; INTRAVENOUS; ORAL at 17:18

## 2020-06-29 ENCOUNTER — OFFICE VISIT (OUTPATIENT)
Dept: PRIMARY CARE CLINIC | Age: 60
End: 2020-06-29
Payer: COMMERCIAL

## 2020-06-29 PROCEDURE — 99211 OFF/OP EST MAY X REQ PHY/QHP: CPT | Performed by: NURSE PRACTITIONER

## 2020-06-29 NOTE — PATIENT INSTRUCTIONS
Steps to help prevent the spread of COVID-19 if you are sick  SOURCE - https://lau-villeda.info/. html     Stay home except to get medical care   ; Stay home: People who are mildly ill with COVID-19 are able to isolate at home during their illness. You should restrict activities outside your home, except for getting medical care.   ; Avoid public areas: Do not go to work, school, or public areas.   ; Avoid public transportation: Avoid using public transportation, ride-sharing, or taxis.  ; Separate yourself from other people and animals in your home   ; Stay away from others: As much as possible, you should stay in a specific room and away from other people in your home. Also, you should use a separate bathroom, if available.   ; Limit contact with pets & animals: You should restrict contact with pets and other animals while you are sick with COVID-19, just like you would around other people. Although there have not been reports of pets or other animals becoming sick with COVID-19, it is still recommended that people sick with COVID-19 limit contact with animals until more information is known about the virus. ; When possible, have another member of your household care for your animals while you are sick. If you are sick with COVID-19, avoid contact with your pet, including petting, snuggling, being kissed or licked, and sharing food. If you must care for your pet or be around animals while you are sick, wash your hands before and after you interact with pets and wear a facemask. See COVID-19 and Animals for more information. Other considerations   The ill person should eat/be fed in their room if possible. Non-disposable  items used should be handled with gloves and washed with hot water or in a . Clean hands after handling used  items.  If possible, dedicate a lined trash can for the ill person.  Use gloves when removing garbage hands are visibly dirty.   ; Avoid touching: Avoid touching your eyes, nose, and mouth with unwashed hands. Handwashing Tips   ; Wet your hands with clean, running water (warm or cold), turn off the tap, and apply soap.  ; Lather your hands by rubbing them together with the soap. Lather the backs of your hands, between your fingers, and under your nails. ; Scrub your hands for at least 20 seconds. Need a timer? Hum the [place] from beginning to end twice.  ; Rinse your hands well under clean, running water.  ; Dry your hands using a clean towel or air dry them. Avoid sharing personal household items   ; Do not share: You should not share dishes, drinking glasses, cups, eating utensils, towels, or bedding with other people or pets in your home.   ; Wash thoroughly after use: After using these items, they should be washed thoroughly with soap and water. Clean all high-touch surfaces everyday   ; Clean and disinfect: Practice routine cleaning of high touch surfaces.  ; High touch surfaces include counters, tabletops, doorknobs, bathroom fixtures, toilets, phones, keyboards, tablets, and bedside tables.  ; Disinfect areas with bodily fluids: Also, clean any surfaces that may have blood, stool, or body fluids on them.   ; Household : Use a household cleaning spray or wipe, according to the label instructions. Labels contain instructions for safe and effective use of the cleaning product including precautions you should take when applying the product, such as wearing gloves and making sure you have good ventilation during use of the product.     Monitor your symptoms   Seek medical attention: Seek prompt medical attention if your illness is worsening     (e.g., difficulty breathing).   ; Call your doctor: Before seeking care, call your healthcare provider and tell them that you have, or are being evaluated for, COVID-19.   ; Wear a facemask when sick: Put on a facemask before you enter the generated  Current Wattbot Status: Active    4. Enter your Social Security Number ([id number]) and Date of Birth (mm/dd/yyyy) as indicated and click Submit. You will be taken to the next sign-up page. 5. Create a BranchOutt ID. This will be your BranchOutt login ID and cannot be changed, so think of one that is secure and easy to remember. 6. Create a Wattbot password. You can change your password at any time. 7. Enter your Password Reset Question and Answer. This can be used at a later time if you forget your password. 8. Enter your e-mail address. You will receive e-mail notification when new information is available in [address] 9. Click Sign Up. You can now view your medical record. Additional Information  If you have questions, please contact your physician practice where you receive care. Remember, Wattbot is NOT to be used for urgent needs. For medical emergencies, dial 911.

## 2020-07-02 ENCOUNTER — HOSPITAL ENCOUNTER (OUTPATIENT)
Dept: GENERAL RADIOLOGY | Age: 60
Discharge: HOME | End: 2020-07-02
Payer: COMMERCIAL

## 2020-07-02 ENCOUNTER — HOSPITAL ENCOUNTER (OUTPATIENT)
Dept: CT IMAGING | Age: 60
Discharge: HOME | End: 2020-07-02
Payer: COMMERCIAL

## 2020-07-02 VITALS
TEMPERATURE: 98.7 F | OXYGEN SATURATION: 95 % | HEART RATE: 89 BPM | DIASTOLIC BLOOD PRESSURE: 86 MMHG | RESPIRATION RATE: 16 BRPM | SYSTOLIC BLOOD PRESSURE: 140 MMHG

## 2020-07-02 LAB
ANION GAP SERPL CALCULATED.3IONS-SCNC: 10 MMOL/L (ref 3–16)
APTT: 35.2 SEC (ref 24.2–36.2)
BASOPHILS ABSOLUTE: 0 K/UL (ref 0–0.2)
BASOPHILS RELATIVE PERCENT: 0.5 %
BUN BLDV-MCNC: 7 MG/DL (ref 7–20)
CALCIUM SERPL-MCNC: 9 MG/DL (ref 8.3–10.6)
CHLORIDE BLD-SCNC: 105 MMOL/L (ref 99–110)
CO2: 23 MMOL/L (ref 21–32)
CREAT SERPL-MCNC: 0.9 MG/DL (ref 0.8–1.3)
EOSINOPHILS ABSOLUTE: 0.1 K/UL (ref 0–0.6)
EOSINOPHILS RELATIVE PERCENT: 1.5 %
GFR AFRICAN AMERICAN: >60
GFR NON-AFRICAN AMERICAN: >60
GLUCOSE BLD-MCNC: 102 MG/DL (ref 70–99)
HCT VFR BLD CALC: 44.7 % (ref 40.5–52.5)
HEMOGLOBIN: 15.1 G/DL (ref 13.5–17.5)
INR BLD: 1.06 (ref 0.86–1.14)
LYMPHOCYTES ABSOLUTE: 1.3 K/UL (ref 1–5.1)
LYMPHOCYTES RELATIVE PERCENT: 22.2 %
MCH RBC QN AUTO: 31.8 PG (ref 26–34)
MCHC RBC AUTO-ENTMCNC: 33.9 G/DL (ref 31–36)
MCV RBC AUTO: 93.8 FL (ref 80–100)
MONOCYTES ABSOLUTE: 0.7 K/UL (ref 0–1.3)
MONOCYTES RELATIVE PERCENT: 12.6 %
NEUTROPHILS ABSOLUTE: 3.7 K/UL (ref 1.7–7.7)
NEUTROPHILS RELATIVE PERCENT: 63.2 %
PDW BLD-RTO: 13.6 % (ref 12.4–15.4)
PLATELET # BLD: 161 K/UL (ref 135–450)
PMV BLD AUTO: 7.4 FL (ref 5–10.5)
POTASSIUM SERPL-SCNC: 4.3 MMOL/L (ref 3.5–5.1)
PROTHROMBIN TIME: 12.3 SEC (ref 10–13.2)
RBC # BLD: 4.76 M/UL (ref 4.2–5.9)
SARS-COV-2: NOT DETECTED
SODIUM BLD-SCNC: 138 MMOL/L (ref 136–145)
SOURCE: NORMAL
WBC # BLD: 5.9 K/UL (ref 4–11)

## 2020-07-02 PROCEDURE — 85610 PROTHROMBIN TIME: CPT

## 2020-07-02 PROCEDURE — 80048 BASIC METABOLIC PNL TOTAL CA: CPT

## 2020-07-02 PROCEDURE — 88341 IMHCHEM/IMCYTCHM EA ADD ANTB: CPT

## 2020-07-02 PROCEDURE — 71045 X-RAY EXAM CHEST 1 VIEW: CPT

## 2020-07-02 PROCEDURE — 6360000002 HC RX W HCPCS: Performed by: RADIOLOGY

## 2020-07-02 PROCEDURE — 88305 TISSUE EXAM BY PATHOLOGIST: CPT

## 2020-07-02 PROCEDURE — 7100000011 HC PHASE II RECOVERY - ADDTL 15 MIN

## 2020-07-02 PROCEDURE — 85025 COMPLETE CBC W/AUTO DIFF WBC: CPT

## 2020-07-02 PROCEDURE — 7100000010 HC PHASE II RECOVERY - FIRST 15 MIN

## 2020-07-02 PROCEDURE — 99151 MOD SED SAME PHYS/QHP <5 YRS: CPT

## 2020-07-02 PROCEDURE — 88342 IMHCHEM/IMCYTCHM 1ST ANTB: CPT

## 2020-07-02 PROCEDURE — 85730 THROMBOPLASTIN TIME PARTIAL: CPT

## 2020-07-02 PROCEDURE — 36415 COLL VENOUS BLD VENIPUNCTURE: CPT

## 2020-07-02 RX ORDER — FENTANYL CITRATE 50 UG/ML
INJECTION, SOLUTION INTRAMUSCULAR; INTRAVENOUS
Status: COMPLETED | OUTPATIENT
Start: 2020-07-02 | End: 2020-07-02

## 2020-07-02 RX ORDER — MIDAZOLAM HYDROCHLORIDE 1 MG/ML
INJECTION INTRAMUSCULAR; INTRAVENOUS
Status: COMPLETED | OUTPATIENT
Start: 2020-07-02 | End: 2020-07-02

## 2020-07-02 RX ORDER — ACETAMINOPHEN 325 MG/1
650 TABLET ORAL EVERY 4 HOURS PRN
Status: DISCONTINUED | OUTPATIENT
Start: 2020-07-02 | End: 2020-07-03 | Stop reason: HOSPADM

## 2020-07-02 RX ADMIN — FENTANYL CITRATE 50 MCG: 50 INJECTION INTRAMUSCULAR; INTRAVENOUS at 12:09

## 2020-07-02 RX ADMIN — MIDAZOLAM 1 MG: 1 INJECTION INTRAMUSCULAR; INTRAVENOUS at 12:18

## 2020-07-02 RX ADMIN — FENTANYL CITRATE 50 MCG: 50 INJECTION INTRAMUSCULAR; INTRAVENOUS at 12:12

## 2020-07-02 RX ADMIN — MIDAZOLAM 1 MG: 1 INJECTION INTRAMUSCULAR; INTRAVENOUS at 12:09

## 2020-07-02 RX ADMIN — MIDAZOLAM 1 MG: 1 INJECTION INTRAMUSCULAR; INTRAVENOUS at 12:12

## 2020-07-02 NOTE — PROGRESS NOTES
Patient/report received from jie Gallego, a/o, skip to lung bx site c/d/i, call light in reach, will monitor

## 2020-07-02 NOTE — PRE SEDATION
Sedation Pre-Procedure Note    Patient Name: Kushal Anderson   YOB: 1960  Room/Bed: Room/bed info not found  Medical Record Number: [id number]  Date: 7/2/2020   Time: 11:22 AM       Indication: RLL lung mass. Stage 4 Colon carcinoma    Consent: I have discussed with the patient and/or the patient representative the indication, alternatives, and the possible risks and/or complications of the planned procedure and the anesthesia methods. The patient and/or patient representative appear to understand and agree to proceed. Vital Signs:   Vitals:    07/02/20 1109   BP: (!) 167/97   Pulse: 96   Resp: 18   Temp: 99.5 °F (37.5 °C)   SpO2: 95%       Past Medical History:   has a past medical history of Anemia, Colon cancer ([address] Hypertension, and Lung nodule. Past Surgical History:   has a past surgical history that includes Colonoscopy; cyst removal (1973); Tunneled venous port placement (Left, 05/30/2017); liver biopsy (Right, 10/05/2017); [place] tooth extraction; Upper gastrointestinal endoscopy; other surgical history (03/19/2018); and Thoracoscopy (Right, 04/16/2018). Medications:   Scheduled Meds:   Continuous Infusions:   PRN Meds:   Home Meds:   Prior to Admission medications    Medication Sig Start Date End Date Taking?  Authorizing Provider   b complex vitamins capsule Take 1 capsule by mouth daily   Yes Historical Provider, MD   Hormone Cream Base CREA by Does not apply route daily   Yes Historical Provider, MD   vitamin C (ASCORBIC ACID) 500 MG tablet Take 500 mg by mouth daily   Yes Historical Provider, MD   lisinopril (PRINIVIL;ZESTRIL) 10 MG tablet Take 1 tablet by mouth daily 8/8/17  Yes VIJAY Mathias CNP   DHEA 25 MG CAPS Take by mouth   Yes Historical Provider, MD   FERROUS SULFATE PO Take 65 mg by mouth daily    Yes Historical Provider, MD   lidocaine-prilocaine (EMLA) 2.5-2.5 % cream Apply a thick layer over the port 30 minutes prior to treatment and cover with belinda wrap. 7/11/17   Dimitri Malagon MD     Coumadin Use Last 7 Days:  no  Antiplatelet drug therapy use last 7 days: no  Other anticoagulant use last 7 days: no  Additional Medication Information:        Pre-Sedation Documentation and Exam:   I have reviewed the patient's history and review of systems.     Mallampati Airway Assessment:  Mallampati Class II - (soft palate, fauces & uvula are visible)    Prior History of Anesthesia Complications:   none    ASA Classification:  Class 2 - A normal healthy patient with mild systemic disease    Sedation/ Anesthesia Plan:   intravenous sedation    Medications Planned:   midazolam (Versed) intravenously and fentanyl intravenously    Patient is an appropriate candidate for plan of sedation: yes    Electronically signed by Wale Rico MD on 7/2/2020 at 11:22 AM

## 2020-07-02 NOTE — PROGRESS NOTES
Patient resting comfortably in cart, vss, denies pain/needs, drsg to lung bx site c/d/i, will monitor

## 2020-07-02 NOTE — BRIEF OP NOTE
Brief Postoperative Note    Sabra Evans  YOB: 1960  [id number]    Pre-operative Diagnosis: RLL lung mass. Stage 4 Colon cancer. Post-operative Diagnosis: Same    Procedure: CT guided lung biopsy    Anesthesia: Moderate Sedation    Surgeons/Assistants: Phalen    Estimated Blood Loss: less than 50     Complications: None    Specimens: Was Obtained:  18g x 5     Findings:   Approach-  Right posterior to 4 cm infrahilar mass in the RLL. Blood patch placed.       Electronically signed by Isabelle Long MD on 7/2/2020 at 12:26 PM

## 2020-07-06 ENCOUNTER — POST-OP TELEPHONE (OUTPATIENT)
Dept: INTERVENTIONAL RADIOLOGY/VASCULAR | Age: 60
End: 2020-07-06

## 2020-07-20 ENCOUNTER — OFFICE VISIT (OUTPATIENT)
Dept: PRIMARY CARE CLINIC | Age: 60
End: 2020-07-20
Payer: COMMERCIAL

## 2020-07-20 PROCEDURE — 99211 OFF/OP EST MAY X REQ PHY/QHP: CPT | Performed by: NURSE PRACTITIONER

## 2020-07-20 NOTE — PROGRESS NOTES
Shawn Cameron received a viral test for COVID-19. They were educated on isolation and quarantine as appropriate. For any symptoms, they were directed to seek care from their PCP, given contact information to establish with a doctor, directed to an urgent care or the emergency room.

## 2020-07-20 NOTE — PATIENT INSTRUCTIONS

## 2020-07-23 ENCOUNTER — HOSPITAL ENCOUNTER (OUTPATIENT)
Dept: INTERVENTIONAL RADIOLOGY/VASCULAR | Age: 60
Discharge: HOME | End: 2020-07-23
Payer: COMMERCIAL

## 2020-07-23 PROCEDURE — 36598 INJ W/FLUOR EVAL CV DEVICE: CPT

## 2020-07-23 PROCEDURE — 6360000004 HC RX CONTRAST MEDICATION: Performed by: RADIOLOGY

## 2020-07-23 RX ADMIN — IOPAMIDOL 15 ML: 755 INJECTION, SOLUTION INTRAVENOUS at 08:48

## 2020-07-23 NOTE — PROGRESS NOTES
IR Attending Note    The port was accessed with successful aspiration of blood. Contrast injection demonstrated appropriate flow through the catheter.     Bryce Ballard MD

## 2020-07-25 LAB
SARS-COV-2: NOT DETECTED
SOURCE: NORMAL

## 2020-09-09 ENCOUNTER — HOSPITAL ENCOUNTER (OUTPATIENT)
Dept: CT IMAGING | Age: 60
Discharge: HOME | End: 2020-09-09
Payer: COMMERCIAL

## 2020-09-09 PROCEDURE — 74177 CT ABD & PELVIS W/CONTRAST: CPT

## 2020-09-09 PROCEDURE — 6360000004 HC RX CONTRAST MEDICATION: Performed by: INTERNAL MEDICINE

## 2020-09-09 RX ADMIN — IOHEXOL 50 ML: 240 INJECTION, SOLUTION INTRATHECAL; INTRAVASCULAR; INTRAVENOUS; ORAL at 13:57

## 2020-09-09 RX ADMIN — IOPAMIDOL 75 ML: 755 INJECTION, SOLUTION INTRAVENOUS at 13:58

## 2020-11-05 ENCOUNTER — OFFICE VISIT (OUTPATIENT)
Dept: PRIMARY CARE CLINIC | Age: 60
End: 2020-11-05
Payer: COMMERCIAL

## 2020-11-05 PROCEDURE — 99211 OFF/OP EST MAY X REQ PHY/QHP: CPT | Performed by: NURSE PRACTITIONER

## 2020-11-05 NOTE — PATIENT INSTRUCTIONS

## 2020-11-05 NOTE — PROGRESS NOTES
Heather Claire received a viral test for COVID-19. They were educated on isolation and quarantine as appropriate. For any symptoms, they were directed to seek care from their PCP, given contact information to establish with a doctor, directed to an urgent care or the emergency room.

## 2020-11-07 LAB — SARS-COV-2, NAA: NOT DETECTED

## 2020-11-12 ENCOUNTER — HOSPITAL ENCOUNTER (OUTPATIENT)
Dept: CT IMAGING | Age: 60
Discharge: HOME | End: 2020-11-12
Payer: COMMERCIAL

## 2020-11-12 PROCEDURE — 74178 CT ABD&PLV WO CNTR FLWD CNTR: CPT

## 2020-11-12 PROCEDURE — 6360000004 HC RX CONTRAST MEDICATION: Performed by: INTERNAL MEDICINE

## 2020-11-12 PROCEDURE — 71260 CT THORAX DX C+: CPT

## 2020-11-12 RX ADMIN — IOPAMIDOL 80 ML: 755 INJECTION, SOLUTION INTRAVENOUS at 12:48

## 2020-11-12 RX ADMIN — IOHEXOL 50 ML: 240 INJECTION, SOLUTION INTRATHECAL; INTRAVASCULAR; INTRAVENOUS; ORAL at 12:48

## 2020-12-21 LAB
ALBUMIN SERPL-MCNC: 2.5 G/DL
ALP BLD-CCNC: 144 U/L
ALT SERPL-CCNC: 18 U/L
ANION GAP SERPL CALCULATED.3IONS-SCNC: NORMAL MMOL/L
AST SERPL-CCNC: 39 U/L
BASOPHILS ABSOLUTE: ABNORMAL
BASOPHILS RELATIVE PERCENT: ABNORMAL
BILIRUB SERPL-MCNC: 0.7 MG/DL (ref 0.1–1.4)
BUN BLDV-MCNC: 5 MG/DL
CALCIUM SERPL-MCNC: 8.3 MG/DL
CHLORIDE BLD-SCNC: 103 MMOL/L
CO2: 26 MMOL/L
CREAT SERPL-MCNC: 0.7 MG/DL
EOSINOPHILS ABSOLUTE: ABNORMAL
EOSINOPHILS RELATIVE PERCENT: ABNORMAL
GFR CALCULATED: NORMAL
GLUCOSE BLD-MCNC: 101 MG/DL
HCT VFR BLD CALC: 35.4 % (ref 41–53)
HEMOGLOBIN: 12.5 G/DL (ref 13.5–17.5)
LYMPHOCYTES ABSOLUTE: ABNORMAL
LYMPHOCYTES RELATIVE PERCENT: ABNORMAL
MCH RBC QN AUTO: 35.1 PG
MCHC RBC AUTO-ENTMCNC: 35.3 G/DL
MCV RBC AUTO: 99.4 FL
MONOCYTES ABSOLUTE: ABNORMAL
MONOCYTES RELATIVE PERCENT: ABNORMAL
NEUTROPHILS ABSOLUTE: ABNORMAL
NEUTROPHILS RELATIVE PERCENT: ABNORMAL
PDW BLD-RTO: 15 %
PLATELET # BLD: 101 K/ΜL
PMV BLD AUTO: ABNORMAL FL
POTASSIUM SERPL-SCNC: 3.1 MMOL/L
RBC # BLD: 3.56 10^6/ΜL
SODIUM BLD-SCNC: 132 MMOL/L
TOTAL PROTEIN: 6.3
WBC # BLD: 3.5 10^3/ML

## 2020-12-23 ENCOUNTER — TELEPHONE (OUTPATIENT)
Dept: SURGERY | Age: 60
End: 2020-12-23

## 2020-12-23 NOTE — TELEPHONE ENCOUNTER
Pt just finished his 11th chemo-he has one more on January 4  He would like to know what the next steps will be?     Please call pt: [phone]

## 2021-01-04 LAB
ALBUMIN SERPL-MCNC: 2.5 G/DL
ALP BLD-CCNC: 175 U/L
ALT SERPL-CCNC: 28 U/L
ANION GAP SERPL CALCULATED.3IONS-SCNC: NORMAL MMOL/L
AST SERPL-CCNC: 41 U/L
BASOPHILS ABSOLUTE: ABNORMAL
BASOPHILS RELATIVE PERCENT: ABNORMAL
BILIRUB SERPL-MCNC: 0.8 MG/DL (ref 0.1–1.4)
BUN BLDV-MCNC: 7 MG/DL
CALCIUM SERPL-MCNC: 8.8 MG/DL
CHLORIDE BLD-SCNC: 103 MMOL/L
CO2: 27 MMOL/L
CREAT SERPL-MCNC: 0.4 MG/DL
EOSINOPHILS ABSOLUTE: ABNORMAL
EOSINOPHILS RELATIVE PERCENT: ABNORMAL
GFR CALCULATED: NORMAL
GLUCOSE BLD-MCNC: 83 MG/DL
HCT VFR BLD CALC: 36.7 % (ref 41–53)
HEMOGLOBIN: 13 G/DL (ref 13.5–17.5)
LYMPHOCYTES ABSOLUTE: ABNORMAL
LYMPHOCYTES RELATIVE PERCENT: ABNORMAL
MCH RBC QN AUTO: 35 PG
MCHC RBC AUTO-ENTMCNC: 35.4 G/DL
MCV RBC AUTO: 98.9 FL
MONOCYTES ABSOLUTE: ABNORMAL
MONOCYTES RELATIVE PERCENT: ABNORMAL
NEUTROPHILS ABSOLUTE: ABNORMAL
NEUTROPHILS RELATIVE PERCENT: ABNORMAL
PDW BLD-RTO: 15.6 %
PLATELET # BLD: 85 K/ΜL
PMV BLD AUTO: ABNORMAL FL
POTASSIUM SERPL-SCNC: 3.3 MMOL/L
RBC # BLD: 3.71 10^6/ΜL
SODIUM BLD-SCNC: 135 MMOL/L
TOTAL PROTEIN: 6.8
WBC # BLD: 4.4 10^3/ML

## 2021-01-06 NOTE — PROGRESS NOTES
Diane Anguiano is here for follow up on liver metastases from colon cancer. Diane Anguiano is followed by Dr. Scotty Newell and has completed 12 cycles of FOLFOX and Avastin as of 1/4/21. He continues to do well. No change in bowel habits or blood in stools. No nausea, vomiting, jaundice or abdominal pain. Denies any recent cough or headaches. No loss of weight or appetite. No change in bowel patter or blood in stools  No other complaints. Review of systems is otherwise negative    Past medical history, Past surgical history, Social history, Family history and allergies reviewed and updated. Vitals:    01/08/21 0930   BP: 133/88   Site: Left Upper Arm   Position: Sitting   Cuff Size: Small Adult   Pulse: 89   Temp: 97.1 °F (36.2 °C)   TempSrc: Temporal   SpO2: 98%   Weight: 181 lb (82.1 kg)   Height: 5' 11\" (1.803 m)     Wt Readings from Last 3 Encounters:   01/08/21 181 lb (82.1 kg)   06/26/20 219 lb (99.3 kg)   01/11/19 227 lb (103 kg)     Body mass index is 25.24 kg/m². O/E:   Constitutional: Patient is oriented to person, place, and time. No distress. HENT: mucus membranes - moist. No scleral icterus. Neck: Supple and normal range of motion. No lymphadenopathy present. Pulmonary/Chest: Effort normal. No respiratory distress. Abdominal: Soft. No distension and no mass. No tenderness. No Hepatosplenomegaly. Well-healed scars present. No hernia's noted. Extremities: no edema and no tenderness. Neurological: Grossly intact motor and sensory exam  Skin: Skin is warm and dry. No rash noted. He is not diaphoretic. Psychiatric: He has a normal mood and affect. His behavior is normal. Judgment and thought content normal.     CEA 11/3/20: 3.97, previous 4.18 (AUG), High 4.42 (June 2020). Albumin 1/4/21: 2.5  ALK PHOS: 175  AST: 41  Potassium: 3.3  Platelet: 78.5    CT C/A/P 11/12/20:     Decreased size of right lower lobe pulmonary nodule.  Stable right middle lobe pulmonary nodule.     Improving portal hilar lymphadenopathy.     Improving hepatic metastases. Resolution of left biliary ductal dilatation.     2 new small groundglass opacities in the left lower lobe of the lung are likely inflammatory. A/P: S/P Robotic-assisted segment 3 liver partial resection  and segment 2 liver nodule wedge resection, intraoperative ultrasound 3/9/18. Diagnosis Orders   1. Malignant neoplasm of ascending colon ([address]     2. Cancer of ascending colon ([address]     3. Liver metastasis ([address]       Imaging Studies and blood work reviewed with the patient. Discussed obtaining a PET scan to see which disease sites are active. Discussed the role of surgery in the setting multiple metastatic sites. Discussed maintenance chemotherapy with possible radiation treatments to active areas on PET. Follow up with Dr. Porsche Pineda. Follow up in as needed or in 3 months    Beryle Ferri MD  Surgery Attending    I, Yumi Ford RN, am scribing for and in the presence of Dr Beryle Ferri. Yumi Ford RN    I, Dr. Beryle Ferri, personally performed the services described in this documentation as scribed by Yumi Ford RN in my presence, and it is both accurate and complete.      Beryle Ferri MD  Surgery Attending

## 2021-01-08 ENCOUNTER — OFFICE VISIT (OUTPATIENT)
Dept: SURGERY | Age: 61
End: 2021-01-08
Payer: COMMERCIAL

## 2021-01-08 VITALS
BODY MASS INDEX: 25.34 KG/M2 | HEIGHT: 71 IN | HEART RATE: 89 BPM | OXYGEN SATURATION: 98 % | WEIGHT: 181 LBS | DIASTOLIC BLOOD PRESSURE: 88 MMHG | TEMPERATURE: 97.1 F | SYSTOLIC BLOOD PRESSURE: 133 MMHG

## 2021-01-08 DIAGNOSIS — C18.2 MALIGNANT NEOPLASM OF ASCENDING COLON (HCC): Primary | ICD-10-CM

## 2021-01-08 DIAGNOSIS — C18.2 CANCER OF ASCENDING COLON (HCC): ICD-10-CM

## 2021-01-08 DIAGNOSIS — C78.7 LIVER METASTASIS (HCC): ICD-10-CM

## 2021-01-08 PROCEDURE — 99213 OFFICE O/P EST LOW 20 MIN: CPT | Performed by: SURGERY

## 2021-01-08 RX ORDER — DIPHENOXYLATE HYDROCHLORIDE AND ATROPINE SULFATE 2.5; .025 MG/1; MG/1
2.5 TABLET ORAL
Status: ON HOLD | COMMUNITY
Start: 2020-12-01 | End: 2021-12-17 | Stop reason: HOSPADM

## 2021-01-11 ENCOUNTER — TELEPHONE (OUTPATIENT)
Dept: SURGERY | Age: 61
End: 2021-01-11

## 2021-01-11 DIAGNOSIS — C78.7 LIVER METASTASIS (HCC): ICD-10-CM

## 2021-01-11 DIAGNOSIS — C18.2 CANCER OF ASCENDING COLON (HCC): Primary | ICD-10-CM

## 2021-01-11 DIAGNOSIS — C18.2 MALIGNANT NEOPLASM OF ASCENDING COLON (HCC): ICD-10-CM

## 2021-01-13 LAB
ALBUMIN SERPL-MCNC: 2.8 G/DL
ALP BLD-CCNC: 186 U/L
ALT SERPL-CCNC: 17 U/L
ANION GAP SERPL CALCULATED.3IONS-SCNC: NORMAL MMOL/L
AST SERPL-CCNC: 25 U/L
BILIRUB SERPL-MCNC: 0.9 MG/DL (ref 0.1–1.4)
BUN BLDV-MCNC: NORMAL MG/DL
CALCIUM SERPL-MCNC: 8.7 MG/DL
CHLORIDE BLD-SCNC: 107 MMOL/L
CO2: 24.1 MMOL/L
CREAT SERPL-MCNC: 0.79 MG/DL
GFR CALCULATED: NORMAL
GLUCOSE BLD-MCNC: 93 MG/DL
POTASSIUM SERPL-SCNC: 3.5 MMOL/L
SODIUM BLD-SCNC: 135 MMOL/L
TOTAL PROTEIN: 6.9

## 2021-01-18 ENCOUNTER — HOSPITAL ENCOUNTER (OUTPATIENT)
Age: 61
End: 2021-01-18
Payer: COMMERCIAL

## 2021-01-20 ENCOUNTER — HOSPITAL ENCOUNTER (OUTPATIENT)
Dept: PET IMAGING | Age: 61
Discharge: HOME | End: 2021-01-20
Payer: COMMERCIAL

## 2021-01-20 VITALS — HEIGHT: 71 IN | BODY MASS INDEX: 24.5 KG/M2 | WEIGHT: 175 LBS

## 2021-01-20 DIAGNOSIS — C18.2 CANCER OF ASCENDING COLON (HCC): ICD-10-CM

## 2021-01-20 DIAGNOSIS — C18.2 MALIGNANT NEOPLASM OF ASCENDING COLON (HCC): ICD-10-CM

## 2021-01-20 DIAGNOSIS — C78.7 LIVER METASTASIS (HCC): ICD-10-CM

## 2021-01-20 PROCEDURE — 78815 PET IMAGE W/CT SKULL-THIGH: CPT

## 2021-01-20 PROCEDURE — 3430000000 HC RX DIAGNOSTIC RADIOPHARMACEUTICAL: Performed by: INTERNAL MEDICINE

## 2021-01-20 PROCEDURE — A9552 F18 FDG: HCPCS | Performed by: INTERNAL MEDICINE

## 2021-01-20 RX ORDER — FLUDEOXYGLUCOSE F 18 200 MCI/ML
14.74 INJECTION, SOLUTION INTRAVENOUS
Status: COMPLETED | OUTPATIENT
Start: 2021-01-20 | End: 2021-01-20

## 2021-01-20 RX ADMIN — FLUDEOXYGLUCOSE F 18 14.74 MILLICURIE: 200 INJECTION, SOLUTION INTRAVENOUS at 12:47

## 2021-01-27 ENCOUNTER — OFFICE VISIT (OUTPATIENT)
Dept: PRIMARY CARE CLINIC | Age: 61
End: 2021-01-27
Payer: COMMERCIAL

## 2021-01-27 DIAGNOSIS — Z20.828 EXPOSURE TO SARS-ASSOCIATED CORONAVIRUS: Primary | ICD-10-CM

## 2021-01-27 PROCEDURE — 99211 OFF/OP EST MAY X REQ PHY/QHP: CPT | Performed by: NURSE PRACTITIONER

## 2021-01-28 LAB — SARS-COV-2: NOT DETECTED

## 2021-02-01 ENCOUNTER — TELEPHONE (OUTPATIENT)
Dept: CARDIOTHORACIC SURGERY | Age: 61
End: 2021-02-01

## 2021-02-01 NOTE — TELEPHONE ENCOUNTER
Pt states seeing dr Nader Cespedes at [facility] and has recently found add'l CA in lung. (s/p RVATS w/wedge excision RLL, hx adenocarcinoma colon). Pt wants to have dr Karen Borges look at current findings and assess if surgery etc is warranted . Will follow up with dr Karen Borges and return call to pt w/update. Requested copies OV notes from [facility] + testing results etc x last 2 years.

## 2021-02-08 ENCOUNTER — OFFICE VISIT (OUTPATIENT)
Dept: CARDIOTHORACIC SURGERY | Age: 61
End: 2021-02-08
Payer: COMMERCIAL

## 2021-02-08 VITALS
OXYGEN SATURATION: 98 % | WEIGHT: 179 LBS | HEIGHT: 71 IN | DIASTOLIC BLOOD PRESSURE: 84 MMHG | TEMPERATURE: 98.2 F | SYSTOLIC BLOOD PRESSURE: 118 MMHG | HEART RATE: 97 BPM | BODY MASS INDEX: 25.06 KG/M2

## 2021-02-08 DIAGNOSIS — C18.2 MALIGNANT NEOPLASM OF ASCENDING COLON (HCC): Primary | ICD-10-CM

## 2021-02-08 PROCEDURE — 99204 OFFICE O/P NEW MOD 45 MIN: CPT | Performed by: THORACIC SURGERY (CARDIOTHORACIC VASCULAR SURGERY)

## 2021-02-08 NOTE — PROGRESS NOTES
Consultation H&P        Date of Admission:  No admission date for patient encounter. Date of Consultation:  2/8/2021    PCP:  Juan A Arndt MD    Referred    Chief Complaint: Colon cancer    History of Present Illness: We are asked to see this patient in consultation by Dr. lydia Odendesiree Bush is a 63 y.o. male who known entity of metastatic colon cancer. Received wedge resection for his single metastatic lung disease did well over the last 3 years his follow-up PET scan showed recurrent disease right lower lobe close to the airway, and middle lobe lesions that PET positive     Past Medical History:  Past Medical History:   Diagnosis Date    Anemia     Colon cancer ([address]     LAST CHEMO 2/5/18    Hypertension     Lung nodule     metastatic        Past Surgical History:  Past Surgical History:   Procedure Laterality Date    COLONOSCOPY      CYST REMOVAL  1973    Behind right knee    LIVER BIOPSY Right 10/05/2017    phalen MD at [facility] in specials as outpt    OTHER SURGICAL HISTORY  03/19/2018     Robotic assisted converted to open right colectomy with anastomosis, greater omental vascularized pedicled flap creation    THORACOSCOPY Right 04/16/2018    RIGHT VIDEO ASSISTED THORACOSCOPY WITH WEDGE EXCISION RIGHT    TUNNELED VENOUS PORT PLACEMENT Left 05/30/2017    UPPER GASTROINTESTINAL ENDOSCOPY      WISDOM TOOTH EXTRACTION         Home Medications:   Prior to Admission medications    Medication Sig Start Date End Date Taking? Authorizing Provider   diphenoxylate-atropine (LOMOTIL) 2.5-0.025 MG per tablet Take 2.5 mg by mouth.  12/1/20  Yes Historical Provider, MD   Magic Mouthwash (MIRACLE MOUTHWASH) Swish and spit 5 mLs 4 times daily as needed for Irritation   Yes Historical Provider, MD   vitamin C (ASCORBIC ACID) 500 MG tablet Take 500 mg by mouth daily   Yes Historical Provider, MD   lidocaine-prilocaine (EMLA) 2.5-2.5 % cream Apply a thick layer over the port 30 minutes prior to treatment and cover with saran wrap. 7/11/17  Yes Kaiser Arias MD        Facility Administered Medications: Allergies: Allergies   Allergen Reactions    Penicillins Hives     1965    Potassium-Containing Compounds Rash        Social History:    Working:   Caffeine:   Lifestyle:    Social History     Socioeconomic History    Marital status: Single     Spouse name: Not on file    Number of children: Not on file    Years of education: Not on file    Highest education level: Not on file   Occupational History    Occupation:    Social Needs    Financial resource strain: Not on file    Food insecurity     Worry: Not on file     Inability: Not on file   [employer] needs     Medical: Not on file     Non-medical: Not on file   Tobacco Use    Smoking status: Never Smoker    Smokeless tobacco: Never Used   Substance and Sexual Activity    Alcohol use:  Yes     Alcohol/week: 2.0 standard drinks     Types: 2 Cans of beer per week     Comment: daily     Drug use: No    Sexual activity: Not on file   Lifestyle    Physical activity     Days per week: Not on file     Minutes per session: Not on file    Stress: Not on file   Relationships    Social connections     Talks on phone: Not on file     Gets together: Not on file     Attends Confucianism service: Not on file     Active member of club or organization: Not on file     Attends meetings of clubs or organizations: Not on file     Relationship status: Not on file    Intimate partner violence     Fear of current or ex partner: Not on file     Emotionally abused: Not on file     Physically abused: Not on file     Forced sexual activity: Not on file   Other Topics Concern    Not on file   Social History Narrative    Not on file       Family History:  Heart Disease:   Stroke:   Cancer:   Diabetes:   Hypertension:   Aneurysm/PVD:         Problem Relation Age of Onset    Cancer Father         Skin (melanoma)    Mult Sclerosis Mother  No Known Problems Brother        Review of Systems:  Constitutional:  No night sweats, headaches, weight loss. Eyes:  No glaucoma, cataracts. ENMT:  No nosebleeds, deviated septum. Cardiac:  No arrhythmias, previous MI. Vascular:  No claudication, varicosities. GI:  No PUD, heartburn. :  No kidney stones, frequent UTIs  Musculoskeletal:  No arthritis, gout. Respiratory:  No SOB, emphysema, asthma. Integumentary:  No dermatitis, itching, rash. Neurological:  No stroke, TIAs, seizures. Psychiatric:  No depression, anxiety. Endocrine: No diabetes, thyroid issues. Hematologic:  No bleeding, easy bruising. Immunologic:  No known cancer, steroid therapies. Physical Examination:    /84 (Site: Left Upper Arm, Position: Sitting, Cuff Size: Medium Adult)   Pulse 97   Temp 98.2 °F (36.8 °C) (Temporal)   Ht 5' 11\" (1.803 m)   Wt 179 lb (81.2 kg)   SpO2 98%   BMI 24.97 kg/m²      BP RUE:  BP LUE:   Admission Weight: 179 lb (81.2 kg)   Hand dominance:    General appearance: NAD, well nourished  Eyes: anicteric, PERRLA  ENMT: no scars or lesions, no nasal deformity, normal dentition, no cyanosis of oral mucosa  Neck: no masses, no thyroid enlargement, no JVD. Respiratory: effort is unlabored, symmetric, no crackles, wheezes or rubs. No palpable/percussable abnormalities. Cardiovascular: regular, no murmur. PMI normal, no thrill. No carotid bruits. No edema or varicosities. Abdominal aorta cannot be appreciated given body habitus. GI: abdomen soft, nondistended, no organomegaly. No masses. Lymphatic: no cervical/supraclavicular adenopathy  Musculoskeletal: strength and tone normal. Full ROM. No scoliosis. Extremities: warm and pink. No clubbing or petechiae. Skin: no dermatitis or ulceration. No nodularity or induration. Neuro: CN grossly intact. Sensation and motor function grossly intact. Psychiatric: oriented, appropriate mood/affect.       MEDICAL DECISION MAKING/TESTING  Studies personally reviewed. PET/CT  Increase in size of the patient's right lower lobe mass which is   hypermetabolic. Kaveh Rain is borderline hypermetabolism in the patient's right   middle lobe pulmonary nodule.       Very slight decrease in size of the patient's dominant liver nodule which has   uptake slightly greater than liver background.       Borderline hypermetabolism in the adenopathy in the olegario. Labs:   CBC: No results for input(s): WBC, HGB, HCT, MCV, PLT in the last 72 hours. BMP: No results for input(s): NA, K, CL, CO2, PHOS, BUN, CREATININE, CALCIUM, MG in the last 72 hours. Cardiac Enzymes: No results for input(s): CKTOTAL, CKMB, CKMBINDEX, TROPONINI in the last 72 hours. PT/INR: No results for input(s): PROTIME, INR in the last 72 hours. APTT: No results for input(s): APTT in the last 72 hours. Liver Profile:  Lab Results   Component Value Date    AST 56 09/18/2018    ALT 59 09/18/2018    BILIDIR 0.4 09/18/2018    BILITOT 1.2 09/18/2018    ALKPHOS 66 09/18/2018   No results found for: CHOL, HDL, TRIG  UA:   Lab Results   Component Value Date    COLORU Yellow 04/12/2018    PHUR 6.0 04/12/2018    CLARITYU Clear 04/12/2018    SPECGRAV 1.020 04/12/2018    LEUKOCYTESUR Negative 04/12/2018    UROBILINOGEN 0.2 04/12/2018    BILIRUBINUR Negative 04/12/2018    BLOODU Negative 04/12/2018    GLUCOSEU Negative 04/12/2018       History obtained: chart, pt    Risk factors: Colon cancer   Diagnosis: Static focal disease    Plan: Agree with oncology patient should proceed with focal radiation followed by chemo. If radiation was not able to control the disease patient's could refer her back for right lower lobectomy with muscle flap  As much as the other spot in the right lung field regress with chemo    Typical periop/postop course reviewed including initial limitations on driving/heavy lifting.  Risks, benefits and postoperative complications discussed including bleeding, infection, stroke, death, postop pulmonary and renal issues. I spent 60 minutes of care and visit with this patient, greater than 50% of time was spent in counseling and coordinating care, image interpretation, discussion with other caregiver.   And future planning    Bebeto Oliveira MD FACS

## 2021-03-10 ENCOUNTER — OFFICE VISIT (OUTPATIENT)
Dept: FAMILY MEDICINE CLINIC | Age: 61
End: 2021-03-10
Payer: COMMERCIAL

## 2021-03-10 VITALS
OXYGEN SATURATION: 99 % | DIASTOLIC BLOOD PRESSURE: 78 MMHG | SYSTOLIC BLOOD PRESSURE: 118 MMHG | HEART RATE: 109 BPM | TEMPERATURE: 97.2 F | HEIGHT: 71 IN | WEIGHT: 181.8 LBS | BODY MASS INDEX: 25.45 KG/M2

## 2021-03-10 DIAGNOSIS — C18.2 MALIGNANT NEOPLASM OF ASCENDING COLON (HCC): ICD-10-CM

## 2021-03-10 DIAGNOSIS — Z00.00 PHYSICAL EXAM, ANNUAL: Primary | ICD-10-CM

## 2021-03-10 PROCEDURE — 99386 PREV VISIT NEW AGE 40-64: CPT | Performed by: FAMILY MEDICINE

## 2021-03-10 ASSESSMENT — PATIENT HEALTH QUESTIONNAIRE - PHQ9
SUM OF ALL RESPONSES TO PHQ9 QUESTIONS 1 & 2: 0
SUM OF ALL RESPONSES TO PHQ QUESTIONS 1-9: 0
SUM OF ALL RESPONSES TO PHQ QUESTIONS 1-9: 0
2. FEELING DOWN, DEPRESSED OR HOPELESS: 0

## 2021-03-10 NOTE — PROGRESS NOTES
Chief Complaint: New Patient (Est care) and Cancer (Stage four cancer.)       HPI: He is here to establish care and for physical exam.  He has history of malignant tumor of ascending colon  with mets to his lungs and liver. Initially diagnosed in 5/2017. He had right colectomy and partial resection of the liver lesion and left lower lung lobectomy in 3/2018 and had chemotherapy  He has reoccurrence of cancer in his  liver and lung in 2020 and had radiotherapy done. He follows up with Dr. Maureen Olsen oncologist to decide needing further chemotherapy. He is here for wellness checkup for his insurance    He never had any medical problems before the diagnosis of colon cancer  He had mild blood pressure for which he was taking lisinopril  And currently not taking any medication      Patient's problem list, medications, allergies, past medical, surgical, social and family histories were reviewed and updated as appropriate. Current Outpatient Medications   Medication Sig Dispense Refill    diphenoxylate-atropine (LOMOTIL) 2.5-0.025 MG per tablet Take 2.5 mg by mouth.  Magic Mouthwash (MIRACLE MOUTHWASH) Swish and spit 5 mLs 4 times daily as needed for Irritation      vitamin C (ASCORBIC ACID) 500 MG tablet Take 500 mg by mouth daily      lidocaine-prilocaine (EMLA) 2.5-2.5 % cream Apply a thick layer over the port 30 minutes prior to treatment and cover with saran wrap. 1 Tube 5     No current facility-administered medications for this visit. Social History     Tobacco Use    Smoking status: Never Smoker    Smokeless tobacco: Never Used   Substance Use Topics    Alcohol use: Yes     Alcohol/week: 2.0 standard drinks     Types: 2 Cans of beer per week     Comment: daily             Review of Systems:  Constitutional: Negative  HENT: Negative   Respiratory: Negative for cough, chest tightness, shortness of breath and wheezing. Cardiovascular: Negative for chest pain, palpitations and leg swelling. Gastrointestinal: Negative    Genitourinary: Negative  Musculoskeletal: Negative  Skin: Negative for color change, pallor, rash and wound. Neurological: Negative for dizziness, tremors, seizures, syncope, facial asymmetry, speech difficulty, weakness, light-headedness, numbness and headaches. Psychiatric/Behavioral: Negative for agitation, confusion, self-injury, sleep disturbance and suicidal ideas. The patient is not nervous/anxious. Objective:     Vitals:    03/10/21 1328   BP: 118/78   Pulse: 109   Temp: 97.2 °F (36.2 °C)   SpO2: 99%   Weight: 181 lb 12.8 oz (82.5 kg)   Height: 5' 11\" (1.803 m)     Body mass index is 25.36 kg/m². Wt Readings from Last 3 Encounters:   03/10/21 181 lb 12.8 oz (82.5 kg)   02/08/21 179 lb (81.2 kg)   01/20/21 175 lb (79.4 kg)     BP Readings from Last 3 Encounters:   03/10/21 118/78   02/08/21 118/84   01/08/21 133/88       Physical exam:  Constitutional: he is oriented to person, place, and time. he appears well-developed and well-nourished. No distress. Neck: Normal range of motion. No JVD present. No tracheal deviation present. No thyromegaly present. Cardiovascular: Normal rate, regular rhythm, normal heart sounds and intact distal pulses. No murmur heard. Pulmonary/Chest: Effort normal and breath sounds normal. No stridor. No respiratory distress. he has no wheezes. he has no rales. heexhibits no tenderness. Abdominal: Soft. Bowel sounds are normal. he exhibits no distension and no mass. There is no tenderness. There is no rebound and no guarding. Musculoskeletal: Normal range of motion. he exhibits no edema, tenderness or deformity. Lymphadenopathy:     he has no cervical adenopathy. Neurological:he is alert and oriented to person, place, and time. he  has normal reflexes. No cranial nerve deficit. Coordination normal.   Skin: Skin is warm and dry. No rash noted. he is not diaphoretic. No erythema. No pallor.    Psychiatric: he has a normal mood and affect. his   behavior is normal.         Health Maintenance   Topic Date Due    Hepatitis C screen  Never done    Pneumococcal 0-64 years Vaccine (1 of 3 - PCV13) Never done    HIV screen  Never done    COVID-19 Vaccine (1 of 2) Never done    DTaP/Tdap/Td vaccine (1 - Tdap) Never done    Lipid screen  Never done    Diabetes screen  Never done    Flu vaccine (1) Never done    Colon cancer screen colonoscopy  05/05/2027    Hepatitis A vaccine  Aged Out    Hepatitis B vaccine  Aged Out    Hib vaccine  Aged Out    Meningococcal (ACWY) vaccine  Aged Out          Assessment/Plan:     1. Physical exam, annual  Reviewed all the records and blood work  And will not recommend the covid 19 shot as currently getting active treament    2. Malignant neoplasm of ascending colon ([facility] [address]  Follows up with Dr Blake Gardner  And has stage v cancer with mets   And finished the radiotherapy.        Sahara Norwood  3/10/2021 6:01 PM

## 2021-03-11 LAB
BASOPHILS ABSOLUTE: ABNORMAL
BASOPHILS RELATIVE PERCENT: ABNORMAL
EOSINOPHILS ABSOLUTE: ABNORMAL
EOSINOPHILS RELATIVE PERCENT: ABNORMAL
HCT VFR BLD CALC: 36.2 % (ref 41–53)
HEMOGLOBIN: 12.6 G/DL (ref 13.5–17.5)
LYMPHOCYTES ABSOLUTE: ABNORMAL
LYMPHOCYTES RELATIVE PERCENT: ABNORMAL
MCH RBC QN AUTO: 35 PG
MCHC RBC AUTO-ENTMCNC: 34.8 G/DL
MCV RBC AUTO: 34.8 FL
MONOCYTES ABSOLUTE: ABNORMAL
MONOCYTES RELATIVE PERCENT: ABNORMAL
NEUTROPHILS ABSOLUTE: ABNORMAL
NEUTROPHILS RELATIVE PERCENT: ABNORMAL
PDW BLD-RTO: 15.9 %
PLATELET # BLD: 155 K/ΜL
PMV BLD AUTO: ABNORMAL FL
RBC # BLD: 3.6 10^6/ΜL
WBC # BLD: 9 10^3/ML

## 2021-04-08 ENCOUNTER — HOSPITAL ENCOUNTER (INPATIENT)
Age: 61
LOS: 5 days | Discharge: HOME | DRG: 871 | End: 2021-04-13
Attending: INTERNAL MEDICINE | Admitting: INTERNAL MEDICINE
Payer: COMMERCIAL

## 2021-04-08 PROBLEM — R78.81 BACTEREMIA: Status: ACTIVE | Noted: 2021-04-08

## 2021-04-08 PROCEDURE — 1200000000 HC SEMI PRIVATE

## 2021-04-08 RX ORDER — PANTOPRAZOLE SODIUM 40 MG/1
40 TABLET, DELAYED RELEASE ORAL
Status: DISCONTINUED | OUTPATIENT
Start: 2021-04-09 | End: 2021-04-13 | Stop reason: HOSPADM

## 2021-04-08 RX ORDER — ONDANSETRON 2 MG/ML
4 INJECTION INTRAMUSCULAR; INTRAVENOUS EVERY 6 HOURS PRN
Status: DISCONTINUED | OUTPATIENT
Start: 2021-04-08 | End: 2021-04-13 | Stop reason: HOSPADM

## 2021-04-08 RX ORDER — SODIUM CHLORIDE 9 MG/ML
INJECTION, SOLUTION INTRAVENOUS CONTINUOUS
Status: DISCONTINUED | OUTPATIENT
Start: 2021-04-09 | End: 2021-04-13 | Stop reason: HOSPADM

## 2021-04-08 ASSESSMENT — PAIN SCALES - GENERAL: PAINLEVEL_OUTOF10: 0

## 2021-04-09 PROBLEM — E43 SEVERE MALNUTRITION (HCC): Status: ACTIVE | Noted: 2021-04-09

## 2021-04-09 LAB
ALBUMIN SERPL-MCNC: 2.7 G/DL (ref 3.4–5)
ALP BLD-CCNC: 258 U/L (ref 40–129)
ALT SERPL-CCNC: 19 U/L (ref 10–40)
ANION GAP SERPL CALCULATED.3IONS-SCNC: 10 MMOL/L (ref 3–16)
AST SERPL-CCNC: 23 U/L (ref 15–37)
BASOPHILS ABSOLUTE: 0 K/UL (ref 0–0.2)
BASOPHILS RELATIVE PERCENT: 0.3 %
BILIRUB SERPL-MCNC: 0.6 MG/DL (ref 0–1)
BILIRUBIN DIRECT: <0.2 MG/DL (ref 0–0.3)
BILIRUBIN, INDIRECT: ABNORMAL MG/DL (ref 0–1)
BUN BLDV-MCNC: 5 MG/DL (ref 7–20)
CALCIUM SERPL-MCNC: 8.5 MG/DL (ref 8.3–10.6)
CHLORIDE BLD-SCNC: 103 MMOL/L (ref 99–110)
CO2: 24 MMOL/L (ref 21–32)
CREAT SERPL-MCNC: 0.6 MG/DL (ref 0.8–1.3)
EOSINOPHILS ABSOLUTE: 0 K/UL (ref 0–0.6)
EOSINOPHILS RELATIVE PERCENT: 0.5 %
GFR AFRICAN AMERICAN: >60
GFR NON-AFRICAN AMERICAN: >60
GLUCOSE BLD-MCNC: 99 MG/DL (ref 70–99)
HCT VFR BLD CALC: 31.5 % (ref 40.5–52.5)
HEMOGLOBIN: 10.6 G/DL (ref 13.5–17.5)
LYMPHOCYTES ABSOLUTE: 0.8 K/UL (ref 1–5.1)
LYMPHOCYTES RELATIVE PERCENT: 12.2 %
MCH RBC QN AUTO: 34.3 PG (ref 26–34)
MCHC RBC AUTO-ENTMCNC: 33.6 G/DL (ref 31–36)
MCV RBC AUTO: 102 FL (ref 80–100)
MONOCYTES ABSOLUTE: 0.6 K/UL (ref 0–1.3)
MONOCYTES RELATIVE PERCENT: 9.2 %
NEUTROPHILS ABSOLUTE: 5.2 K/UL (ref 1.7–7.7)
NEUTROPHILS RELATIVE PERCENT: 77.8 %
PDW BLD-RTO: 13.9 % (ref 12.4–15.4)
PLATELET # BLD: 191 K/UL (ref 135–450)
PMV BLD AUTO: 6.5 FL (ref 5–10.5)
POTASSIUM SERPL-SCNC: 3.6 MMOL/L (ref 3.5–5.1)
RBC # BLD: 3.08 M/UL (ref 4.2–5.9)
SODIUM BLD-SCNC: 137 MMOL/L (ref 136–145)
TOTAL PROTEIN: 7.4 G/DL (ref 6.4–8.2)
WBC # BLD: 6.7 K/UL (ref 4–11)

## 2021-04-09 PROCEDURE — 6370000000 HC RX 637 (ALT 250 FOR IP): Performed by: INTERNAL MEDICINE

## 2021-04-09 PROCEDURE — 6370000000 HC RX 637 (ALT 250 FOR IP): Performed by: NURSE PRACTITIONER

## 2021-04-09 PROCEDURE — 87040 BLOOD CULTURE FOR BACTERIA: CPT

## 2021-04-09 PROCEDURE — 80048 BASIC METABOLIC PNL TOTAL CA: CPT

## 2021-04-09 PROCEDURE — 99223 1ST HOSP IP/OBS HIGH 75: CPT | Performed by: INTERNAL MEDICINE

## 2021-04-09 PROCEDURE — 36415 COLL VENOUS BLD VENIPUNCTURE: CPT

## 2021-04-09 PROCEDURE — 6360000002 HC RX W HCPCS: Performed by: INTERNAL MEDICINE

## 2021-04-09 PROCEDURE — 80076 HEPATIC FUNCTION PANEL: CPT

## 2021-04-09 PROCEDURE — 1200000000 HC SEMI PRIVATE

## 2021-04-09 PROCEDURE — 85025 COMPLETE CBC W/AUTO DIFF WBC: CPT

## 2021-04-09 PROCEDURE — 2580000003 HC RX 258: Performed by: INTERNAL MEDICINE

## 2021-04-09 RX ORDER — ACETAMINOPHEN 325 MG/1
650 TABLET ORAL EVERY 6 HOURS PRN
Status: DISCONTINUED | OUTPATIENT
Start: 2021-04-09 | End: 2021-04-13 | Stop reason: HOSPADM

## 2021-04-09 RX ORDER — ACETAMINOPHEN 160 MG/5ML
650 SOLUTION ORAL EVERY 6 HOURS PRN
Status: DISCONTINUED | OUTPATIENT
Start: 2021-04-09 | End: 2021-04-09

## 2021-04-09 RX ORDER — DIPHENOXYLATE HYDROCHLORIDE AND ATROPINE SULFATE 2.5; .025 MG/1; MG/1
1 TABLET ORAL 4 TIMES DAILY PRN
Status: DISCONTINUED | OUTPATIENT
Start: 2021-04-09 | End: 2021-04-13 | Stop reason: HOSPADM

## 2021-04-09 RX ADMIN — SODIUM CHLORIDE: 9 INJECTION, SOLUTION INTRAVENOUS at 10:40

## 2021-04-09 RX ADMIN — VANCOMYCIN HYDROCHLORIDE 1250 MG: 10 INJECTION, POWDER, LYOPHILIZED, FOR SOLUTION INTRAVENOUS at 12:29

## 2021-04-09 RX ADMIN — VANCOMYCIN HYDROCHLORIDE 1250 MG: 10 INJECTION, POWDER, LYOPHILIZED, FOR SOLUTION INTRAVENOUS at 01:11

## 2021-04-09 RX ADMIN — SODIUM CHLORIDE: 9 INJECTION, SOLUTION INTRAVENOUS at 01:08

## 2021-04-09 RX ADMIN — SODIUM CHLORIDE: 9 INJECTION, SOLUTION INTRAVENOUS at 20:25

## 2021-04-09 RX ADMIN — DIPHENOXYLATE HYDROCHLORIDE AND ATROPINE SULFATE 1 TABLET: 2.5; .025 TABLET ORAL at 12:27

## 2021-04-09 RX ADMIN — ENOXAPARIN SODIUM 40 MG: 40 INJECTION SUBCUTANEOUS at 10:40

## 2021-04-09 RX ADMIN — PANTOPRAZOLE SODIUM 40 MG: 40 TABLET, DELAYED RELEASE ORAL at 06:02

## 2021-04-09 RX ADMIN — ACETAMINOPHEN 650 MG: 325 TABLET ORAL at 20:33

## 2021-04-09 ASSESSMENT — ENCOUNTER SYMPTOMS
TROUBLE SWALLOWING: 0
SHORTNESS OF BREATH: 0
ABDOMINAL PAIN: 0
SORE THROAT: 0
WHEEZING: 0
RHINORRHEA: 0
CONSTIPATION: 0
BACK PAIN: 0
EYE DISCHARGE: 0
EYE REDNESS: 0
DIARRHEA: 0
COUGH: 0
NAUSEA: 0

## 2021-04-09 ASSESSMENT — PAIN SCALES - GENERAL
PAINLEVEL_OUTOF10: 0
PAINLEVEL_OUTOF10: 0

## 2021-04-09 NOTE — PLAN OF CARE
Nutrition Problem #1: Severe malnutrition  Intervention: Food and/or Nutrient Delivery: Continue Current Diet, Start Oral Nutrition Supplement  Nutritional Goals: PO greater than 50% at meals/supp

## 2021-04-09 NOTE — PROGRESS NOTES
Message sent to Dr. Robertson Mom: Patient direct admit from Dr. Lili Jaffe office today. patient has positive blood cultures. No orders have been placed. Please advise. [phone] Message sent: Can you please order code status?     0009 message received: Ask Dr Lili Jaffe tomorrow

## 2021-04-09 NOTE — PROGRESS NOTES
Patient direct admit from doctor's office. Patient arrive to unit in stable condition; oriented to room. Will continue to monitor. Patient here for positive blood cultures.

## 2021-04-09 NOTE — H&P
[facility]   HISTORY AND PHYSICAL NOTE      4/9/2021 11:01 AM    Patient Information:  Shabnam Lynn is a 57 y.o. male [id number]  PCP:  Kerri Arias MD (Tel: [phone] )    Primary Oncologist: Chayo Ferrara MD    Chief complaint:  No chief complaint on file. History of Present Illness:  Andrés Bansal is a 57 y.o. male patient of Chayo Ferrara MD who is treated for stage IV colon cancer. Completed 12 cycles of FOLFOX 1/4/21. Increased size of lung lesion on PET scan 1/20/21, s/p radiation therapy, completed early March 2021. Currently on break from chemotherapy. Called the office on 4/7/21 c/o fevers on and off for the past 2 weeks. Blood cultures were ordered. [facility] office was notified yesterday that one of the cultures was positive for gram positive cocci. Patient was directly admitted to [facility]. Patient denies cough or SOB, no chest pain. No urinary symptoms. He has chronic diarrhea on and off, he reports that it is no worse than usual. He takes Lomotil prn. He has a port, which is not tender or swollen. REVIEW OF SYSTEMS:   Constitutional:  Negative for fever,chills or night sweats  ENT:  Negative for rhinorrhea, epistaxis, hoarseness, sore throat. Respiratory:   Negative for shortness of breath,wheezing  Cardiovascular:   Negative for  chest pain, palpitations   Gastrointestinal:  Negative for nausea, vomiting, diarrhea  Genitourinary:  Negative for polyuria, dysuria   Hematologic/Lymphatic:  Negative for  bleeding tendency, easy bruising  Musculoskeletal:  Negative for myalgias and arthralgias  Neurologic:  Negative for  confusion,dysarthria. Skin:  Negative for itching,rash  Psychiatric:  Negative for depression,anxiety, agitation. Endocrine:  Negative for polydipsia,polyuria,heat /cold intolerance. Past Medical History:   has a past medical history of Anemia, Colon cancer ([address] Hypertension, and Lung nodule.      Past Surgical History:   has a past surgical history that includes Colonoscopy; cyst removal (1973); Tunneled venous port placement (Left, 05/30/2017); liver biopsy (Right, 10/05/2017); [place] tooth extraction; Upper gastrointestinal endoscopy; other surgical history (03/19/2018); and Thoracoscopy (Right, 04/16/2018). Medications:  No current facility-administered medications on file prior to encounter. Current Outpatient Medications on File Prior to Encounter   Medication Sig Dispense Refill    diphenoxylate-atropine (LOMOTIL) 2.5-0.025 MG per tablet Take 2.5 mg by mouth.  Magic Mouthwash (MIRACLE MOUTHWASH) Swish and spit 5 mLs 4 times daily as needed for Irritation      vitamin C (ASCORBIC ACID) 500 MG tablet Take 500 mg by mouth daily      lidocaine-prilocaine (EMLA) 2.5-2.5 % cream Apply a thick layer over the port 30 minutes prior to treatment and cover with saran wrap. 1 Tube 5       Allergies: Allergies   Allergen Reactions    Penicillins Hives     1964    Potassium-Containing Compounds Rash        Social History:   reports that he has never smoked. He has never used smokeless tobacco. He reports current alcohol use of about 2.0 standard drinks of alcohol per week. He reports that he does not use drugs. Family History:  family history includes Cancer in his father; Mult Sclerosis in his mother; No Known Problems in his brother. ,     Physical Exam:  /76   Pulse 98   Temp 98.3 °F (36.8 °C) (Oral)   Resp 18   Ht 5' 11\" (1.803 m)   Wt 180 lb (81.6 kg)   SpO2 97%   BMI 25.10 kg/m²     General appearance:  Appears comfortable. Well nourished  Eyes: Sclera clear, pupils equal  ENT: Moist mucus membranes, no thrush. Trachea midline. Cardiovascular: Regular rhythm, normal S1, S2. No murmur, gallop, rub.  No edema in lower extremities  Respiratory: Clear to auscultation bilaterally, no wheeze, good inspiratory effort  Gastrointestinal: Abdomen soft, non-tender, not distended  Musculoskeletal: No cyanosis in digits, neck supple  Neurology: Cranial nerves grossly intact. Alert and oriented in time, place and person. No speech or motor deficits  Psychiatry: Appropriate affect. Not agitated  Skin: Warm, dry, normal turgor, no rash    Labs:  CBC:   Lab Results   Component Value Date    WBC 6.7 04/09/2021    RBC 3.08 04/09/2021    RBC 5.11 08/21/2017    HGB 10.6 04/09/2021    HCT 31.5 04/09/2021    .0 04/09/2021    MCH 34.3 04/09/2021    MCHC 33.6 04/09/2021    RDW 13.9 04/09/2021     04/09/2021    MPV 6.5 04/09/2021     BMP:    Lab Results   Component Value Date     04/09/2021    K 3.6 04/09/2021    K 3.9 03/20/2018     04/09/2021    CO2 24 04/09/2021    BUN 5 04/09/2021    CREATININE 0.6 04/09/2021    CALCIUM 8.5 04/09/2021    GFRAA >60 04/09/2021    LABGLOM >60 04/09/2021    GLUCOSE 99 04/09/2021    GLUCOSE 105 08/21/2017       Imaging:       Problem List  Active Problems:    Positive blood cultures    Metastasis from colon cancer [facility])    Essential hypertension    Alkaline phosphatase elevation    Non-smoker  Resolved Problems:    * No resolved hospital problems. *        Assessment & Plan:     Stage IV colon cancer  - liver and lung metastasis  - completed 12 cycles FOLFOX Jan 2021  - completed XRT to lung lesion March 2021  - currently not receiving treatment  - CEA from 4/7/21 is 2.98, was 3.61 in Dec 2020    Bacteremia   - positive blood cultures on 4/7/21, gram + cocci  - on vancomycin  - ID consult pending    Admit as inpatient. I anticipate hospitalization spanning at least two midnights for investigation and treatment of the above medically necessary diagnoses. Rowan Beverly CNP  [facility]    Patient was seen and examined. Agree with above. Clinically patient appears stable. Has gram-positive cocci in the blood - blood cultures at [facility]  Continue IV vancomycin.   ID consult is pending    Nessa Jay MD

## 2021-04-09 NOTE — PROGRESS NOTES
Clinical Pharmacy Note: Pharmacy to Dose Vancomycin    Lillette Nissen is a 57 y.o. male started on Vancomycin; consult received from Dr. Rupert Hernandes to manage therapy. Also receiving the following antibiotics: N/A. Goal Trough Level: 15-20 mcg/mL    Assessment/Plan:  Initiate vancomycin 1250 mg IV every 12 hours. A vancomycin trough has been ordered for [email or web address]. Changes in regimen will be determined based on culture results, renal function, and clinical response. Pharmacy will continue to monitor and adjust regimen as necessary. Allergies:  Penicillins and Potassium-containing compounds         No results for input(s): CREATININE in the last 72 hours. No results for input(s): WBC in the last 72 hours. Ht Readings from Last 1 Encounters:   04/08/21 5' 11\" (1.803 m)        Wt Readings from Last 1 Encounters:   04/08/21 180 lb (81.6 kg)         CrCl cannot be calculated (Patient's most recent lab result is older than the maximum 10 days allowed. ).       Thank you for the consult,    Fernando Loaiza PharmD

## 2021-04-09 NOTE — PROGRESS NOTES
Comprehensive Nutrition Assessment    Type and Reason for Visit:  Initial, Positive Nutrition Screen    Nutrition Recommendations/Plan:   1. Continue current diet  2. Encourage po  3. RD ordered Ensure Enlive BID- strawberry    Nutrition Assessment:  Pt is nutritionally compromised d/t recent wt loss and poor appetite over the past year. During pt's wt loss he was undergoing chemo and was experiencing poor appetite and n/v. Pt could barely keep anything down resulting in a 55lb wt loss. Pt's appetite has improved somewhat but he does drink Ensure at home BID. Pt agreeable to RD ordering supplement BID while pt admitted. Pt denies any other needs at this time. Malnutrition Assessment:  Malnutrition Status:  Severe malnutrition    Context:  Chronic Illness     Findings of the 6 clinical characteristics of malnutrition:  Energy Intake:  7 - 75% or less estimated energy requirements for 1 month or longer  Weight Loss:  7 - Greater than 20% over 1 year     Body Fat Loss:  1 - Mild body fat loss Orbital, Triceps   Muscle Mass Loss:  1 - Mild muscle mass loss Temples (temporalis), Clavicles (pectoralis & deltoids)  Fluid Accumulation:  No significant fluid accumulation     Strength:  Not Performed    Estimated Daily Nutrient Needs:  Energy (kcal):  8286-6662 cals (20-25 jeni/82kg); Weight Used for Energy Requirements:  Current     Protein (g):  101-156 gms (1.3-2.0 gms/IBW 78kg); Weight Used for Protein Requirements:  Ideal        Fluid (ml/day):   ; Method Used for Fluid Requirements:  1 ml/kcal      Nutrition Related Findings:  No edema. I/O's: n/a      Wounds:  None       Current Nutrition Therapies:    DIET GENERAL; Anthropometric Measures:  · Height: 5' 11\" (180.3 cm)  · Current Body Weight: 180 lb (81.6 kg)   · Usual Body Weight: 225 lb (102.1 kg)     · Ideal Body Weight: 172 lbs; % Ideal Body Weight     · BMI: 25.1  · BMI Categories: Overweight (BMI 25.0-29. 9)       Nutrition Diagnosis:   · Severe malnutrition related to inadequate protein-energy intake as evidenced by weight loss greater than or equal to 20% in 1 year      Nutrition Interventions:   Food and/or Nutrient Delivery:  Continue Current Diet, Start Oral Nutrition Supplement  Nutrition Education/Counseling:  Education not indicated   Coordination of Nutrition Care:  Continue to monitor while inpatient    Goals:  PO greater than 50% at meals/supp       Nutrition Monitoring and Evaluation:   Behavioral-Environmental Outcomes:  None Identified   Food/Nutrient Intake Outcomes:  Food and Nutrient Intake, Supplement Intake  Physical Signs/Symptoms Outcomes:  Weight     Discharge Planning:     Too soon to determine     Electronically signed by Lakeisha Davis RD, [address] [place] , LD on 4/9/21 at 3:13 PM EDT    Contact: [phone]

## 2021-04-09 NOTE — CONSULTS
Infectious Diseases   Consult Note        Admission Date: 4/8/2021  Hospital Day: Hospital Day: 2   Attending: Floar Cho MD  Date of service: 4/9/21     Reason for admission: Bacteremia [R78.81]    Chief complaint/ Reason for consult: Positive blood cultures    Microbiology:        I have reviewed allavailable micro lab data and cultures    · Blood culture (2/2) - collected on 4/8/2021: Antibiotics and immunizations:       Current antibiotics: All antibiotics and their doses were reviewed by me    Recent Abx Admin                   vancomycin (VANCOCIN) 1,250 mg in dextrose 5 % 250 mL IVPB (mg) 1,250 mg New Bag 04/09/21 0111                  Immunization History: All immunization history was reviewed by me today. There is no immunization history on file for this patient. Known drug allergies: All allergies were reviewed and updated    Allergies   Allergen Reactions    Penicillins Hives     1965    Potassium-Containing Compounds Rash       Social history:     Social History:  All social andepidemiologic history was reviewed and updated by me today as needed. · Tobacco use:   reports that he has never smoked. He has never used smokeless tobacco.  · Alcohol use:   reports current alcohol use of about 2.0 standard drinks of alcohol per week. · Currently lives in: [address] [place] [address]  ·  reports no history of drug use. Assessment:     The patient is a 57 y.o. old male who  has a past medical history of Anemia, Colon cancer ([address] Hypertension, and Lung nodule. with following problems:    · Positive blood culture, concern for bacteremia  · Port-A-Cath in place in the left side of the chest  · Metastatic colon cancer with liver and lung metastasis  · History of chemoradiation therapy  · Essential hypertension  · Non-smoker  · Elevated alkaline phosphatase      Discussion:      The patient is afebrile. White cell count is 6700.     The patient had 2 sets of blood cultures done on 4/7/2021 at oncology office. One done from his left-sided Port-A-Cath  and other from the left arm. Blood cultures were reportedly positive for gram-positive cocci. I do not have any official report to confirm that    He tells me that he was having intermittent fever and chills every 3 to 4 days over the past 10 days and hence he had the blood cultures done    Plan:     Diagnostic Workup:    · Will order*2 sets of blood cultures today  · Follow-up in identification susceptibility of gram-positive cocci isolated from the blood culture  · Continue to follow fever curve, WBC count and blood cultures  · Follow up on liverand renal functions closely    Antimicrobials:    · Will continue IV vancomycin  Check Vancomycin trough before the 5th dose. Target vancomycin trough level of 15-20  mg/L or vancomycin area under curve (AUC) of 400-600 mg*h/L by Bayesian modeling method. If dosing vancomycin based on trough levels, keep vancomycin trough below 20 at all times. Avoid increasing the dose of vancomycin above a total of 4 grams in a 24-hour period in patients younger than 45 years and above 3 grams in a 24-hour period in a patient of age 39 years or older. Continue to monitor serum creatinine and Vanco levels closely, while the patient is on I/v Vancomycin. · We will follow up on the culture results and clinical progress and will make further recommendations accordingly. · Continue close vitals monitoring. · Maintain good glycemic control. · Fall precautions. Aspiration precautions. · Continue to watch for new fever or diarrhea. · DVT prophylaxis. · Discussed all above with patient and RN. Drug Monitoring:    · Continue serial monitoring for antibiotic toxicity as follows: *Vancomycin trough, BMP  · Continue to watch for following: new or worsening fever, hypotension, hives, lip swelling and redness or purulence at vascular access sites.     I/v access Management:    · Continue to monitor i.v access sites for erythema, induration, discharge or tenderness. · As always, continue efforts to minimizetubes/lines/drains as clinically appropriate to reduce chances of line associated infections. Current isolation precautions: There are no current isolations documented for this patient. Level of complexity of consult: High     Risk of Complications/Morbidity: High     · Illness(es)/ Infection present that pose threat to life/bodily function. · There is potential for severe exacerbation of infection/side effects of treatment. · Therapy requires intensive monitoring for antimicrobial agent toxicity. Thank you for involving me in the care of your patient. I will continue to follow. If you have any additional questions, please do not hesitate to contact me. Subjective:     Presenting complaint in ER:     No chief complaint on file. HPI: Leoncio Piña is a 57 y.o. male patient, who was seen at the request of Dr. Governor Wally MD.    History was obtained from chart review and the patient. The patient was admitted on 4/8/2021. I have been consulted to see the patient for above mentioned reason(s). The patient has multiple medical comorbidities, and presented to the ER for concerns for bacteremia    The patient has history of metastatic colon cancer with mets to the lung and the liver, which was initial diagnosed in May 2017. The patient had right colectomy and partial resection of the liver lesion and left lung lobectomy in March 2018 and had chemotherapy. He had radiotherapy done in 2024 recurrence and follows up with Dr. Libby Tolbert as an outpatient. He had blood cultures done at [facility] which came back positive. The patient was admitted directly to [facility] by oncology    I have been asked for my opinion for management for this patient. Past Medical History: All past medical history reviewed today.     Past Medical History:   Diagnosis Date    Anemia     Colon cancer ([address]     LAST CHEMO 2/5/18    Hypertension     Lung nodule     metastatic          Past Surgical History: All pastsurgical history was reviewed today. Past Surgical History:   Procedure Laterality Date    COLONOSCOPY      CYST REMOVAL  1973    Behind right knee    LIVER BIOPSY Right 10/05/2017    [facility]en MD at [facility] in specials as outpt    OTHER SURGICAL HISTORY  03/19/2018     Robotic assisted converted to open right colectomy with anastomosis, greater omental vascularized pedicled flap creation    THORACOSCOPY Right 04/16/2018    RIGHT VIDEO ASSISTED THORACOSCOPY WITH WEDGE EXCISION RIGHT    TUNNELED VENOUS PORT PLACEMENT Left 05/30/2017    UPPER GASTROINTESTINAL ENDOSCOPY      WISDOM TOOTH EXTRACTION           Family History: All family history was reviewed today. Problem Relation Age of Onset    Cancer Father         Skin (melanoma)    Mult Sclerosis Mother     No Known Problems Brother          Medications: All current and past medications were reviewed. Medications Prior to Admission: diphenoxylate-atropine (LOMOTIL) 2.5-0.025 MG per tablet, Take 2.5 mg by mouth. Magic Mouthwash (MIRACLE MOUTHWASH), Swish and spit 5 mLs 4 times daily as needed for Irritation  vitamin C (ASCORBIC ACID) 500 MG tablet, Take 500 mg by mouth daily  lidocaine-prilocaine (EMLA) 2.5-2.5 % cream, Apply a thick layer over the port 30 minutes prior to treatment and cover with saran wrap.  enoxaparin  40 mg Subcutaneous Daily    vancomycin  1,250 mg Intravenous Q12H    pantoprazole  40 mg Oral QAM AC          REVIEW OF SYSTEMS:       Review of Systems   Constitutional: Positive for chills (Has been having chills every 3 to 4 days) and fatigue. Negative for diaphoresis and fever. HENT: Negative for ear discharge, ear pain, rhinorrhea, sore throat and trouble swallowing. Eyes: Negative for discharge and redness.    Respiratory: Negative respiratory distress. Breath sounds: Normal breath sounds. No stridor. No wheezing, rhonchi or rales. Abdominal:      General: Bowel sounds are normal.      Palpations: Abdomen is soft. Tenderness: There is no abdominal tenderness. There is no right CVA tenderness, left CVA tenderness, guarding or rebound. Musculoskeletal: Normal range of motion. General: No swelling or tenderness. Lymphadenopathy:      Cervical: No cervical adenopathy. Skin:     General: Skin is warm and dry. Coloration: Skin is not jaundiced. Findings: No erythema or rash. Comments: Port-A-Cath in place in left side of the chest   Neurological:      General: No focal deficit present. Mental Status: He is alert and oriented to person, place, and time. Mental status is at baseline. Motor: No abnormal muscle tone. Psychiatric:         Mood and Affect: Mood normal.         Behavior: Behavior normal.         Thought Content: Thought content normal.          Lines: All vascular access sites are healthy with no local erythema, discharge or tenderness. Intake and output:     No intake/output data recorded. Lab Data:   All available labs were reviewed by me today. CBC:   Recent Labs     04/09/21  0019   WBC 6.7   RBC 3.08*   HGB 10.6*   HCT 31.5*      .0*   MCH 34.3*   MCHC 33.6   RDW 13.9        BMP:  Recent Labs     04/09/21  0019      K 3.6      CO2 24   BUN 5*   CREATININE 0.6*   CALCIUM 8.5   GLUCOSE 99        Hepatic FunctionPanel:   Lab Results   Component Value Date    ALKPHOS 258 04/09/2021    ALT 19 04/09/2021    AST 23 04/09/2021    PROT 7.4 04/09/2021    PROT 7.4 08/21/2017    BILITOT 0.6 04/09/2021    BILIDIR <0.2 04/09/2021    IBILI see below 04/09/2021    LABALBU 2.7 04/09/2021       CPK: No results found for: CKTOTAL  ESR: No results found for: SEDRATE  CRP: No results found for: CRP      Imaging:     All pertinent images and reports for the current visit were reviewed by meduring this visit. No orders to display       Outside records:    Labs, Microbiology, Radiology and pertinent results from Care everywhere, if available, were reviewed as a part ofthe consultation. Problem list:       Patient Active Problem List   Diagnosis Code    Malignant neoplasm of ascending colon ([facility] C18.2    Colon cancer metastasized to liver ([facility] C18.9, C78.7    Chemotherapy-induced neutropenia (HCC) D70.1, T45.1X5A    Chemotherapy-induced thrombocytopenia D69.59, T45.1X5A    Cancer of ascending colon ([address] C18.2    Right lower lobe lung mass R91.8    Lung mass R91.8    Liver lesion K76.9    Positive blood cultures R78.81    Metastasis from colon cancer (HCC) C79.9, C18.9    Essential hypertension I10    Alkaline phosphatase elevation R74.8    Non-smoker Z78.9         Please note that this chart was generated using Dragon dictation software. Although every effort was made to ensure the accuracy of this automated transcription, some errors in transcription may have occurred inadvertently. If you may need any clarification, please do not hesitate to contact me through EPIC or at the phone number provided below with my electronic signature. Any pictures or media included in this note were obtained after taking informed verbal consent from the patient and with their approval to include those in the patient's medical record.       Cj Esqueda MD, MPH  4/9/21, 10:54 AM EDT   [facility]   [address] [place] [address], [address] [place] [address]  Office: [phone]  Fax: [phone]  Clinic days:  Tuesday & Thursday

## 2021-04-10 LAB
ANION GAP SERPL CALCULATED.3IONS-SCNC: 7 MMOL/L (ref 3–16)
BASOPHILS ABSOLUTE: 0.1 K/UL (ref 0–0.2)
BASOPHILS RELATIVE PERCENT: 1.8 %
BUN BLDV-MCNC: 5 MG/DL (ref 7–20)
C DIFF TOXIN/ANTIGEN: NORMAL
CALCIUM SERPL-MCNC: 8 MG/DL (ref 8.3–10.6)
CHLORIDE BLD-SCNC: 106 MMOL/L (ref 99–110)
CO2: 26 MMOL/L (ref 21–32)
CREAT SERPL-MCNC: 0.6 MG/DL (ref 0.8–1.3)
EOSINOPHILS ABSOLUTE: 0 K/UL (ref 0–0.6)
EOSINOPHILS RELATIVE PERCENT: 0.3 %
FERRITIN: 213.1 NG/ML (ref 30–400)
GFR AFRICAN AMERICAN: >60
GFR NON-AFRICAN AMERICAN: >60
GLUCOSE BLD-MCNC: 104 MG/DL (ref 70–99)
HCT VFR BLD CALC: 28.7 % (ref 40.5–52.5)
HEMOGLOBIN: 9.7 G/DL (ref 13.5–17.5)
IRON SATURATION: 30 % (ref 20–50)
IRON: 32 UG/DL (ref 59–158)
LYMPHOCYTES ABSOLUTE: 0.6 K/UL (ref 1–5.1)
LYMPHOCYTES RELATIVE PERCENT: 16.1 %
MCH RBC QN AUTO: 34.4 PG (ref 26–34)
MCHC RBC AUTO-ENTMCNC: 33.8 G/DL (ref 31–36)
MCV RBC AUTO: 101.7 FL (ref 80–100)
MONOCYTES ABSOLUTE: 0.3 K/UL (ref 0–1.3)
MONOCYTES RELATIVE PERCENT: 9.8 %
NEUTROPHILS ABSOLUTE: 2.5 K/UL (ref 1.7–7.7)
NEUTROPHILS RELATIVE PERCENT: 72 %
PDW BLD-RTO: 13.8 % (ref 12.4–15.4)
PLATELET # BLD: 138 K/UL (ref 135–450)
PMV BLD AUTO: 6.1 FL (ref 5–10.5)
POTASSIUM SERPL-SCNC: 3.6 MMOL/L (ref 3.5–5.1)
RBC # BLD: 2.82 M/UL (ref 4.2–5.9)
SODIUM BLD-SCNC: 139 MMOL/L (ref 136–145)
TOTAL IRON BINDING CAPACITY: 107 UG/DL (ref 260–445)
TRANSFERRIN: 96 MG/DL (ref 200–360)
VANCOMYCIN TROUGH: 9.1 UG/ML (ref 10–20)
VITAMIN B-12: 659 PG/ML (ref 211–911)
WBC # BLD: 3.5 K/UL (ref 4–11)

## 2021-04-10 PROCEDURE — 80202 ASSAY OF VANCOMYCIN: CPT

## 2021-04-10 PROCEDURE — 85025 COMPLETE CBC W/AUTO DIFF WBC: CPT

## 2021-04-10 PROCEDURE — 6360000002 HC RX W HCPCS: Performed by: INTERNAL MEDICINE

## 2021-04-10 PROCEDURE — 2580000003 HC RX 258: Performed by: INTERNAL MEDICINE

## 2021-04-10 PROCEDURE — 87324 CLOSTRIDIUM AG IA: CPT

## 2021-04-10 PROCEDURE — 80048 BASIC METABOLIC PNL TOTAL CA: CPT

## 2021-04-10 PROCEDURE — 82607 VITAMIN B-12: CPT

## 2021-04-10 PROCEDURE — 36415 COLL VENOUS BLD VENIPUNCTURE: CPT

## 2021-04-10 PROCEDURE — 6370000000 HC RX 637 (ALT 250 FOR IP): Performed by: INTERNAL MEDICINE

## 2021-04-10 PROCEDURE — 83540 ASSAY OF IRON: CPT

## 2021-04-10 PROCEDURE — 82728 ASSAY OF FERRITIN: CPT

## 2021-04-10 PROCEDURE — 84466 ASSAY OF TRANSFERRIN: CPT

## 2021-04-10 PROCEDURE — 1200000000 HC SEMI PRIVATE

## 2021-04-10 PROCEDURE — 87449 NOS EACH ORGANISM AG IA: CPT

## 2021-04-10 RX ADMIN — VANCOMYCIN HYDROCHLORIDE 1500 MG: 10 INJECTION, POWDER, LYOPHILIZED, FOR SOLUTION INTRAVENOUS at 14:40

## 2021-04-10 RX ADMIN — VANCOMYCIN HYDROCHLORIDE 1250 MG: 10 INJECTION, POWDER, LYOPHILIZED, FOR SOLUTION INTRAVENOUS at 00:48

## 2021-04-10 RX ADMIN — ENOXAPARIN SODIUM 40 MG: 40 INJECTION SUBCUTANEOUS at 08:50

## 2021-04-10 RX ADMIN — SODIUM CHLORIDE: 9 INJECTION, SOLUTION INTRAVENOUS at 16:36

## 2021-04-10 RX ADMIN — PANTOPRAZOLE SODIUM 40 MG: 40 TABLET, DELAYED RELEASE ORAL at 05:10

## 2021-04-10 RX ADMIN — SODIUM CHLORIDE: 9 INJECTION, SOLUTION INTRAVENOUS at 05:20

## 2021-04-10 RX ADMIN — ACETAMINOPHEN 650 MG: 325 TABLET ORAL at 16:47

## 2021-04-10 ASSESSMENT — PAIN SCALES - GENERAL
PAINLEVEL_OUTOF10: 0

## 2021-04-10 NOTE — PROGRESS NOTES
Clinical Pharmacy Note: Pharmacy to Dose Vancomcyin    Vancomycin Day: 2  Current Dosin every 12 hours      Recent Labs     21  0019 04/10/21  0515   BUN 5* 5*       Recent Labs     21  0019 04/10/21  0515   CREATININE 0.6* 0.6*       Recent Labs     21  0019 04/10/21  0515   WBC 6.7 3.5*         Intake/Output Summary (Last 24 hours) at 4/10/2021 1318  Last data filed at 4/10/2021 0745  Gross per 24 hour   Intake 240 ml   Output --   Net 240 ml         Ht Readings from Last 1 Encounters:   21 5' 11\" (1.803 m)        Wt Readings from Last 1 Encounters:   21 180 lb (81.6 kg)         Body mass index is 25.1 kg/m². Estimated Creatinine Clearance: 136 mL/min (A) (based on SCr of 0.6 mg/dL (L)). Trough/Random: 9.1 mcg/mL    Assessment/Plan:   Vancomycin level is subtherapeutic. Level was drawn appropriately in respect to last dose given.  Will change dose to 1500 every 12 hours   A vancomycin trough has been ordered for  @ 1330, prior to the 4th dose of the new regimen.  Changes in regimen will be determined based on culture results, renal function, and clinical response.  [facility] get [facility] result as soon as possible.  If culture is positive for Streptococcus we can likely switch to another antimicrobial therapy. [address] [place] [address] will continue to monitor and adjust regimen as necessary.     Thank you for the consult,    Gloria Rodriges, PharmD  PGY-1 Pharmacy Resident  [id number]

## 2021-04-10 NOTE — PROGRESS NOTES
Patient's head to toe assessment complete at this time. Routine VSS. Temperature 100.3; PRN tylenol ordered and given per MAR. Patient is awake, alert, and oriented; resting comfortably in bed. No nightly medications scheduled at this time. Patient independent in room. Port flushed and brisk blood return noted. Patient placed in contact precautions for C. Diff rule out. Patient aware that he needs to call if he has a bowel movement. No other needs expressed at this time, call light within reach. Will continue to monitor.

## 2021-04-10 NOTE — PROGRESS NOTES
Message sent to Dr. Dinorah Logan regarding patient's temperature of 100.3. See new orders for Tylenol PO 650mg PRN Q6.     2033 PRN Tylenol given per [facility] for temperature 100.3.

## 2021-04-10 NOTE — PLAN OF CARE
Re-Assessment complete, see flow sheet. BP (!) 142/84   Pulse 90   Temp 100.5 °F (38.1 °C) (Oral)   Resp 18   Ht 5' 11\" (1.803 m)   Wt 180 lb (81.6 kg)   SpO2 95%   BMI 25.10 kg/m²  room air. Given tylenol per request for temperature 100.5. Will continue to monitor. Brisa Martinez RN, BSN, PCCN.

## 2021-04-10 NOTE — PROGRESS NOTES
08/21/2017    BILITOT 0.6 04/09/2021    BILIDIR <0.2 04/09/2021    IBILI see below 04/09/2021    LABALBU 2.7 04/09/2021     LDH:  No results found for: LDH  PT/INR:    Lab Results   Component Value Date    PROTIME 12.3 07/02/2020    INR 1.06 07/02/2020     PTT:    Lab Results   Component Value Date    APTT 35.2 07/02/2020   [APTT    Current Medications  Current Facility-Administered Medications: enoxaparin (LOVENOX) injection 40 mg, 40 mg, Subcutaneous, Daily  diphenoxylate-atropine (LOMOTIL) 2.5-0.025 MG per tablet 1 tablet, 1 tablet, Oral, 4x Daily PRN  acetaminophen (TYLENOL) tablet 650 mg, 650 mg, Oral, Q6H PRN  vancomycin (VANCOCIN) 1,250 mg in dextrose 5 % 250 mL IVPB, 1,250 mg, Intravenous, Q12H  0.9 % sodium chloride infusion, , Intravenous, Continuous  ondansetron (ZOFRAN) injection 4 mg, 4 mg, Intravenous, Q6H PRN  pantoprazole (PROTONIX) tablet 40 mg, 40 mg, Oral, QAM AC    ASSESSMENT AND PLAN    1. Stage IV colon cancer:    -Has been treated with surgery, chemotherapy, radiation to metastatic lesions.  -Currently being monitored. 2. Gram-positive cocci bacteremia:    -This was noted after he was evaluated in the office for fever  -Was hospitalized on 4/8/2021 and has been on IV vancomycin. -ID consult appreciated. - Spoke to microbiology lab from [facility] - 1/2 blood cultures + for Streptococcus. They will be faxing over results to [facility]. 3.  Anemia:    -We will check iron and B12 studies. 4.  Diarrhea:    -C. difficile NEGATIVE    5.   DVT prophylaxis      Nessa Jay MD

## 2021-04-11 LAB
ANION GAP SERPL CALCULATED.3IONS-SCNC: 5 MMOL/L (ref 3–16)
BUN BLDV-MCNC: 5 MG/DL (ref 7–20)
CALCIUM SERPL-MCNC: 8.1 MG/DL (ref 8.3–10.6)
CHLORIDE BLD-SCNC: 104 MMOL/L (ref 99–110)
CO2: 28 MMOL/L (ref 21–32)
CREAT SERPL-MCNC: 0.6 MG/DL (ref 0.8–1.3)
GFR AFRICAN AMERICAN: >60
GFR NON-AFRICAN AMERICAN: >60
GLUCOSE BLD-MCNC: 96 MG/DL (ref 70–99)
POTASSIUM REFLEX MAGNESIUM: 3.6 MMOL/L (ref 3.5–5.1)
SODIUM BLD-SCNC: 137 MMOL/L (ref 136–145)

## 2021-04-11 PROCEDURE — 99232 SBSQ HOSP IP/OBS MODERATE 35: CPT | Performed by: INTERNAL MEDICINE

## 2021-04-11 PROCEDURE — 6360000002 HC RX W HCPCS: Performed by: INTERNAL MEDICINE

## 2021-04-11 PROCEDURE — 2580000003 HC RX 258: Performed by: INTERNAL MEDICINE

## 2021-04-11 PROCEDURE — 6370000000 HC RX 637 (ALT 250 FOR IP): Performed by: INTERNAL MEDICINE

## 2021-04-11 PROCEDURE — 80048 BASIC METABOLIC PNL TOTAL CA: CPT

## 2021-04-11 PROCEDURE — 1200000000 HC SEMI PRIVATE

## 2021-04-11 RX ADMIN — SODIUM CHLORIDE: 9 INJECTION, SOLUTION INTRAVENOUS at 02:34

## 2021-04-11 RX ADMIN — CEFTRIAXONE 2000 MG: 2 INJECTION, POWDER, FOR SOLUTION INTRAMUSCULAR; INTRAVENOUS at 18:48

## 2021-04-11 RX ADMIN — PANTOPRAZOLE SODIUM 40 MG: 40 TABLET, DELAYED RELEASE ORAL at 06:15

## 2021-04-11 RX ADMIN — VANCOMYCIN HYDROCHLORIDE 1500 MG: 10 INJECTION, POWDER, LYOPHILIZED, FOR SOLUTION INTRAVENOUS at 15:04

## 2021-04-11 RX ADMIN — SODIUM CHLORIDE: 9 INJECTION, SOLUTION INTRAVENOUS at 16:57

## 2021-04-11 RX ADMIN — ENOXAPARIN SODIUM 40 MG: 40 INJECTION SUBCUTANEOUS at 09:24

## 2021-04-11 RX ADMIN — VANCOMYCIN HYDROCHLORIDE 1500 MG: 10 INJECTION, POWDER, LYOPHILIZED, FOR SOLUTION INTRAVENOUS at 02:53

## 2021-04-11 ASSESSMENT — PAIN SCALES - GENERAL
PAINLEVEL_OUTOF10: 0
PAINLEVEL_OUTOF10: 0

## 2021-04-11 NOTE — PROGRESS NOTES
[facility]   Progress Note      SUBJECTIVE:      Feels good. Had temperature of 100.5 on 4/10/2021 at 4:30 PM.  Appetite is good. OBJECTIVE    Physical  VITALS:  /86   Pulse 92   Temp 99.4 °F (37.4 °C) (Oral)   Resp 16   Ht 5' 11\" (1.803 m)   Wt 180 lb (81.6 kg)   SpO2 96%   BMI 25.10 kg/m²   TEMPERATURE:  Current - Temp: 99.4 °F (37.4 °C); Max - Temp  Av.3 °F (37.4 °C)  Min: 98.6 °F (37 °C)  Max: 100.5 °F (38.1 °C)  BLOOD PRESSURE RANGE:  Systolic (33BVM), YYX:733 , Min:126 , QYR:936   ; Diastolic (52UPE), RTR:30, Min:82, Max:90    24HR INTAKE/OUTPUT:      Intake/Output Summary (Last 24 hours) at 2021 0758  Last data filed at 4/10/2021 1636  Gross per 24 hour   Intake 4993 ml   Output --   Net 4993 ml       Conscious alert and oriented. Mild pallor noted. No icterus. Lungs are clear to auscultation no rales or rhonchi heard. Abdomen is soft bowel sounds are heard no organomegaly noted. Extremities no edema.     Data  Labs:  General Labs:  CBC with Differential:    Lab Results   Component Value Date    WBC 3.5 04/10/2021    RBC 2.82 04/10/2021    RBC 5.11 2017    HGB 9.7 04/10/2021    HCT 28.7 04/10/2021     04/10/2021    .7 04/10/2021    MCH 34.4 04/10/2021    MCHC 33.8 04/10/2021    RDW 13.8 04/10/2021    LYMPHOPCT 16.1 04/10/2021    LYMPHOPCT 26.2 2017    MONOPCT 9.8 04/10/2021    BASOPCT 1.8 04/10/2021    MONOSABS 0.3 04/10/2021    LYMPHSABS 0.6 04/10/2021    EOSABS 0.0 04/10/2021    BASOSABS 0.1 04/10/2021     BMP:    Lab Results   Component Value Date     2021    K 3.6 2021     2021    CO2 28 2021    BUN 5 2021    LABALBU 2.7 2021    CREATININE 0.6 2021    CALCIUM 8.1 2021    GFRAA >60 2021    LABGLOM >60 2021    GLUCOSE 96 2021    GLUCOSE 105 2017     Hepatic Function Panel:    Lab Results   Component Value Date    ALKPHOS 258 2021    ALT 19

## 2021-04-11 NOTE — PROGRESS NOTES
ID Follow-up NOTE    CC:   Bacteremia, Leukocytosis  Antibiotics: Vancomycin    Admit Date: 4/8/2021  Hospital Day: 4    Subjective:     Patient feels good, no complaint       Objective:     Tm 99.7 (Tm 100.3 on 4/10)    Patient Vitals for the past 8 hrs:   BP Temp Temp src Pulse Resp SpO2   04/11/21 1645 126/84 99.7 °F (37.6 °C) Oral 88 16 96 %   04/11/21 1303 124/83 99.4 °F (37.4 °C) Oral 101 13 97 %     I/O last 3 completed shifts: In: 1972 [P.O.:840; I.V.:3783; IV Piggyback:250]  Out: -   No intake/output data recorded. EXAM:  GENERAL: No apparent distress.     HEENT: Membranes moist, no oral lesion  NECK:  Supple, no lymphadenopathy  LUNGS: Clear b/l, no rales, no dullness  CARDIAC: RRR, no murmur appreciated  ABD:  + BS, soft / NT  EXT:  No rash, no edema, no lesions  NEURO: No focal neurologic findings  PSYCH: Orientation, sensorium, mood normal  LINES:  L chest port in place        Data Review:  Lab Results   Component Value Date    WBC 3.5 (L) 04/10/2021    HGB 9.7 (L) 04/10/2021    HCT 28.7 (L) 04/10/2021    .7 (H) 04/10/2021     04/10/2021     Lab Results   Component Value Date    CREATININE 0.6 (L) 04/11/2021    BUN 5 (L) 04/11/2021     04/11/2021    K 3.6 04/11/2021     04/11/2021    CO2 28 04/11/2021       Hepatic Function Panel:   Lab Results   Component Value Date    ALKPHOS 258 04/09/2021    ALT 19 04/09/2021    AST 23 04/09/2021    PROT 7.4 04/09/2021    PROT 7.4 08/21/2017    BILITOT 0.6 04/09/2021    BILIDIR <0.2 04/09/2021    IBILI see below 04/09/2021    LABALBU 2.7 04/09/2021       MICRO:  4/9 BC x 2 no growth to date  4/10 C diff neg    4/7 BC        IMAGING:  None     Scheduled Meds:   vancomycin  1,500 mg Intravenous Q12H    enoxaparin  40 mg Subcutaneous Daily    pantoprazole  40 mg Oral QAM AC       Continuous Infusions:   sodium chloride 100 mL/hr at 04/11/21 1657       PRN Meds:  diphenoxylate-atropine, acetaminophen, ondansetron      Assessment: Stage IV colon ca    Fever   Bacteremia - 1 set with S sanguinous, Actinomyces odontolyticus, has port in place     PCN allergy - 'hives' at age 1, 4    Plan:     Change to ceftriaxone   Can consider port salvage   - rx x 4 weeks and then monitor for fever / recurrent bacterema   - may also use antibiotic lock therapy to increase change of salvage, can use 3rd cephalosporin or vanco    Discussed with pt, visitor  Lynda Salgado MD

## 2021-04-12 PROBLEM — B95.5 STREPTOCOCCAL BACTEREMIA: Status: ACTIVE | Noted: 2021-04-08

## 2021-04-12 LAB
ANION GAP SERPL CALCULATED.3IONS-SCNC: 4 MMOL/L (ref 3–16)
BUN BLDV-MCNC: 7 MG/DL (ref 7–20)
CALCIUM SERPL-MCNC: 8.2 MG/DL (ref 8.3–10.6)
CHLORIDE BLD-SCNC: 106 MMOL/L (ref 99–110)
CO2: 28 MMOL/L (ref 21–32)
CREAT SERPL-MCNC: <0.5 MG/DL (ref 0.8–1.3)
GFR AFRICAN AMERICAN: >60
GFR NON-AFRICAN AMERICAN: >60
GLUCOSE BLD-MCNC: 111 MG/DL (ref 70–99)
POTASSIUM REFLEX MAGNESIUM: 3.7 MMOL/L (ref 3.5–5.1)
SODIUM BLD-SCNC: 138 MMOL/L (ref 136–145)

## 2021-04-12 PROCEDURE — 6360000002 HC RX W HCPCS: Performed by: INTERNAL MEDICINE

## 2021-04-12 PROCEDURE — 6370000000 HC RX 637 (ALT 250 FOR IP): Performed by: INTERNAL MEDICINE

## 2021-04-12 PROCEDURE — 1200000000 HC SEMI PRIVATE

## 2021-04-12 PROCEDURE — 99233 SBSQ HOSP IP/OBS HIGH 50: CPT | Performed by: INTERNAL MEDICINE

## 2021-04-12 PROCEDURE — 2580000003 HC RX 258: Performed by: INTERNAL MEDICINE

## 2021-04-12 PROCEDURE — 80048 BASIC METABOLIC PNL TOTAL CA: CPT

## 2021-04-12 RX ORDER — AMOXICILLIN 250 MG/1
500 CAPSULE ORAL EVERY 8 HOURS SCHEDULED
Status: DISCONTINUED | OUTPATIENT
Start: 2021-04-12 | End: 2021-04-13 | Stop reason: HOSPADM

## 2021-04-12 RX ADMIN — SODIUM CHLORIDE: 9 INJECTION, SOLUTION INTRAVENOUS at 15:03

## 2021-04-12 RX ADMIN — CEFTRIAXONE 2000 MG: 2 INJECTION, POWDER, FOR SOLUTION INTRAMUSCULAR; INTRAVENOUS at 18:16

## 2021-04-12 RX ADMIN — ENOXAPARIN SODIUM 40 MG: 40 INJECTION SUBCUTANEOUS at 08:30

## 2021-04-12 RX ADMIN — AMOXICILLIN 500 MG: 250 CAPSULE ORAL at 16:54

## 2021-04-12 RX ADMIN — PANTOPRAZOLE SODIUM 40 MG: 40 TABLET, DELAYED RELEASE ORAL at 04:22

## 2021-04-12 RX ADMIN — ACETAMINOPHEN 650 MG: 325 TABLET ORAL at 22:33

## 2021-04-12 RX ADMIN — AMOXICILLIN 500 MG: 250 CAPSULE ORAL at 22:20

## 2021-04-12 RX ADMIN — SODIUM CHLORIDE: 9 INJECTION, SOLUTION INTRAVENOUS at 04:10

## 2021-04-12 ASSESSMENT — ENCOUNTER SYMPTOMS
RHINORRHEA: 0
WHEEZING: 0
EYE DISCHARGE: 0
DIARRHEA: 0
TROUBLE SWALLOWING: 0
BACK PAIN: 0
SHORTNESS OF BREATH: 0
SORE THROAT: 0
EYE REDNESS: 0
COUGH: 0
CONSTIPATION: 0
NAUSEA: 0
ABDOMINAL PAIN: 0

## 2021-04-12 ASSESSMENT — PAIN DESCRIPTION - PAIN TYPE: TYPE: ACUTE PAIN

## 2021-04-12 ASSESSMENT — PAIN SCALES - GENERAL
PAINLEVEL_OUTOF10: 0
PAINLEVEL_OUTOF10: 0

## 2021-04-12 ASSESSMENT — PAIN DESCRIPTION - DESCRIPTORS: DESCRIPTORS: ACHING

## 2021-04-12 ASSESSMENT — PAIN DESCRIPTION - FREQUENCY: FREQUENCY: CONTINUOUS

## 2021-04-12 NOTE — PROGRESS NOTES
Bedside report received from Nathalia Holbrook [facility]. Pt lying in bed with no s/s pain or distress. Ice chips requested and given. No further needs at this time. Call light within reach.   .Electronically signed by Varsha Diallo RN on 4/11/2021 at 8:38 PM

## 2021-04-12 NOTE — PROGRESS NOTES
Head to toe assessment complete. Vital signs obtained. Pt resting in bed at this time. AM medication administered. Pt tolerated well. Pt denies pain. Pt denies further needs at this time. Call light in hand. Pt verbalizes correct use. Will continue to monitor.  Electronically signed by Kizzy Becerra RN on 4/12/2021 at 8:37 AM

## 2021-04-12 NOTE — PROGRESS NOTES
Infectious Diseases   Progress Note      Admission Date: 4/8/2021  Hospital Day: Hospital Day: 5   Attending: Armin Jorgensen MD  Date of service: 4/12/2021     Chief complaint/ Reason for consult:     · Positive blood culture, concern for bacteremia -1 set was positive for Streptococcus anginosus, 1 set positive for actinomyces  · History of penicillin allergy  · Port-A-Cath in place in the left side of the chest  · Metastatic colon cancer with liver and lung metastasis    Microbiology:        I have reviewed allavailable micro lab data and cultures    · Blood culture (2/2) - collected on 4/8/2021: Negative so far  · Blood culture  - collected on 4/7/2021: 1 set positive for Streptococcus, 1 set positive for actinomyces    Care Everywhere Result Report  Culture, Blood/Bone Marrow Spec Type - BloodResulted: 4/10/2021 9:15 AM  [facility]  Component Name Value Ref Range   Blood Culture Streptococcus sanguinis (A)     Blood Culture Actinomyces odontolyticus (A)     Specimen Collected on   Blood - Blood, Venous 4/7/2021 12:19 PM       Antibiotics and immunizations:       Current antibiotics: All antibiotics and their doses were reviewed by me    Recent Abx Admin                   cefTRIAXone (ROCEPHIN) 2000 mg IVPB in D5W 50ml minibag (mg) 2,000 mg New Bag 04/11/21 1848    vancomycin (VANCOCIN) 1,500 mg in dextrose 5 % 250 mL IVPB (mg) 1,500 mg New Bag 04/11/21 1504                  Immunization History: All immunization history was reviewed by me today. There is no immunization history on file for this patient. Known drug allergies: All allergies were reviewed and updated    Allergies   Allergen Reactions    Penicillins Hives     1965    Potassium-Containing Compounds Rash       Social history:     Social History:  All social andepidemiologic history was reviewed and updated by me today as needed. · Tobacco use:   reports that he has never smoked.  He has never used smokeless tobacco.  · Alcohol use:   reports current alcohol use of about 2.0 standard drinks of alcohol per week. · Currently lives in: [address] [place], [place] [address]. [id number]  ·  reports no history of drug use. Assessment:     The patient is a 57 y.o. old male who  has a past medical history of Anemia, Colon cancer ([address] Hypertension, and Lung nodule. with following problems:    · Positive blood culture, concern for bacteremia -1 set was positive for Streptococcus anginosus, 1 set positive for actinomyces  · History of penicillin allergy  · Port-A-Cath in place in the left side of the chest  · Metastatic colon cancer with liver and lung metastasis  · History of chemoradiation therapy  · Essential hypertension  · Non-smoker  · Elevated alkaline phosphatase          Discussion:      The patient is on IV ceftriaxone. He is tolerating antibiotic okay. He was switched from IV vancomycin to ceftriaxone by Dr. Lina Luis yesterday    Blood cultures from 4/9/2021 are running negative. Serum creatinine 0.5. White cell count is 3500. Blood cultures from 4/9/2021 are negative so far. Stool C. difficile EIA was negative    Plan:     Diagnostic Workup:    · Follow-up on repeat blood culture from 4/9/2021  · Continue to follow  fever curve, WBC count and blood cultures. · Continue to monitor blood counts, liver and renal function. · Will order 2D echo to rule out endocarditis    Antimicrobials:    · Will o continue IV ceftriaxone 2 g every 24 hour  · We will follow up on blood culture and 2D echo results. Anticipated on 4 weeks of antibiotics  · Patient has taken amoxicillin before. Penicillins are considered the drug of choice for actinomyces. The patient tells me that his allergy was labeled from a rash in 1965. He does not think he is penicillin allergic.   We will start him on oral amoxicillin 500 mg every 8 hour for actinomyces coverage  · We will try to salvage the port unless repeat cultures come back positive  · We will follow up on the culture results and clinical progress and will make further recommendations accordingly. · Continue close vitals monitoring. · Maintain good glycemic control. · Fall precautions. Aspiration precautions. · Continue to watch for new fever or diarrhea. · DVT prophylaxis. · Discussed all above with patient and RN. Drug Monitoring:    · Continue monitoring for antibiotic toxicity as follows: CBC, CMP   · Continue to watch for following: new or worsening fever, new hypotension, hives, lip swelling and redness or purulence at vascular access sites. I/v access Management:    · Continue to monitor i.v access sites for erythema, induration, discharge or tenderness. · As always, continue efforts to minimize tubes/lines/drains as clinically appropriate to reduce chances of line associated infections. Patient education and counseling:        · The patient was educated in detail about the side-effects of various antibiotics and things to watch for like new rashes, lip swelling, severe reaction, worsening diarrhea, break through fever etc.  · Discussed patient's condition and what to expect. All of the patient's questions were addressed in a satisfactory manner and patient verbalized understanding all instructions. Level of complexity of visit: High     Risk of Complications/Morbidity: High     · Illness(es)/ Infection present that pose threat to life/bodily function. · There is potential for severe exacerbation of infection/side effects of treatment. · Therapy requires intensive monitoring for antimicrobial agent toxicity. TIME SPENT TODAY:     - Spent over  36  minutes on visit (including interval history, physical exam, review of data including labs, cultures, imaging, development and implementation of treatment plan and coordination of complex care).  More than 50 percent of this includes face-to-face time spent with the patient for counseling and coordination of care. Thank you for involving me in the care of your patient. I will continue to follow. If you have anyadditional questions, please do not hesitate to contact me. Subjective: Interval history: Interval history was obtained from chart review and patient/ RN. The patient is afebrile. He is tolerating IV ceftriaxone okay. REVIEW OF SYSTEMS:      Review of Systems   Constitutional: Positive for fatigue. Negative for chills, diaphoresis and fever. HENT: Negative for ear discharge, ear pain, rhinorrhea, sore throat and trouble swallowing. Eyes: Negative for discharge and redness. Respiratory: Negative for cough, shortness of breath and wheezing. Cardiovascular: Negative for chest pain and leg swelling. Gastrointestinal: Negative for abdominal pain, constipation, diarrhea and nausea. Endocrine: Negative for polyuria. Genitourinary: Negative for dysuria, flank pain, frequency, hematuria and urgency. Musculoskeletal: Negative for back pain and myalgias. Skin: Negative for rash. Neurological: Negative for dizziness, seizures and headaches. Hematological: Does not bruise/bleed easily. Psychiatric/Behavioral: Negative for hallucinations and suicidal ideas. All other systems reviewed and are negative. Past Medical History: All past medical history reviewed today. Past Medical History:   Diagnosis Date    Anemia     [facility] cancer ([facility]     LAST CHEMO 2/5/18    Hypertension     Lung nodule     metastatic        Past Surgical History: All past surgical history was reviewed today.     Past Surgical History:   Procedure Laterality Date    COLONOSCOPY      CYST REMOVAL  1973    Behind right knee    LIVER BIOPSY Right 10/05/2017    phalen MD at [facility] in specials as outpt    OTHER SURGICAL HISTORY  03/19/2018     Robotic assisted converted to open right colectomy with anastomosis, greater omental vascularized pedicled flap creation    THORACOSCOPY Right 04/16/2018 RIGHT VIDEO ASSISTED THORACOSCOPY WITH WEDGE EXCISION RIGHT    TUNNELED VENOUS PORT PLACEMENT Left 05/30/2017    UPPER GASTROINTESTINAL ENDOSCOPY      WISDOM TOOTH EXTRACTION         Family History: All family history was reviewed today. Problem Relation Age of Onset    Cancer Father         Skin (melanoma)    Mult Sclerosis Mother     No Known Problems Brother        Objective:       PHYSICAL EXAM:      Vitals:   Vitals:    04/12/21 0000 04/12/21 0400 04/12/21 0827 04/12/21 1159   BP: 136/62 (!) 142/90 135/88 124/81   Pulse: 82 76 90 95   Resp: 18 18 18 16   Temp: 98.2 °F (36.8 °C) 97.7 °F (36.5 °C) 97.8 °F (36.6 °C) 98.9 °F (37.2 °C)   TempSrc: Oral Oral Oral Oral   SpO2: 96% 96% 98% 97%   Weight:       Height:           Physical Exam  Vitals signs and nursing note reviewed. Constitutional:       Appearance: Normal appearance. He is well-developed. HENT:      Head: Normocephalic and atraumatic. Right Ear: External ear normal.      Left Ear: External ear normal.      Nose: Nose normal. No congestion or rhinorrhea. Mouth/Throat:      Mouth: Mucous membranes are moist.      Pharynx: No oropharyngeal exudate or posterior oropharyngeal erythema. Eyes:      General: No scleral icterus. Right eye: No discharge. Left eye: No discharge. Conjunctiva/sclera: Conjunctivae normal.      Pupils: Pupils are equal, round, and reactive to light. Neck:      Musculoskeletal: Normal range of motion and neck supple. No neck rigidity or muscular tenderness. Cardiovascular:      Rate and Rhythm: Normal rate and regular rhythm. Pulses: Normal pulses. Heart sounds: No murmur. No friction rub. Pulmonary:      Effort: Pulmonary effort is normal. No respiratory distress. Breath sounds: Normal breath sounds. No stridor. No wheezing, rhonchi or rales. Abdominal:      General: Bowel sounds are normal.      Palpations: Abdomen is soft. Tenderness:  There is no abdominal tenderness. There is no right CVA tenderness, left CVA tenderness, guarding or rebound. Musculoskeletal: Normal range of motion. General: No swelling or tenderness. Lymphadenopathy:      Cervical: No cervical adenopathy. Skin:     General: Skin is warm and dry. Coloration: Skin is not jaundiced. Findings: No erythema or rash. Neurological:      General: No focal deficit present. Mental Status: He is alert and oriented to person, place, and time. Mental status is at baseline. Motor: No abnormal muscle tone. Psychiatric:         Mood and Affect: Mood normal.         Behavior: Behavior normal.         Thought Content: Thought content normal.           Lines: All vascular access sites are healthy with no local erythema, discharge or tenderness. Intake and output:    I/O last 3 completed shifts: In: 840 [P.O.:840]  Out: -     Lab Data:   All available labs and old records have been reviewed by me. CBC:  Recent Labs     04/10/21  0515   WBC 3.5*   RBC 2.82*   HGB 9.7*   HCT 28.7*      .7*   MCH 34.4*   MCHC 33.8   RDW 13.8        BMP:  Recent Labs     04/10/21  0515 04/11/21  0621 04/12/21  0415    137 138   K 3.6 3.6 3.7    104 106   CO2 26 28 28   BUN 5* 5* 7   CREATININE 0.6* 0.6* <0.5*   CALCIUM 8.0* 8.1* 8.2*   GLUCOSE 104* 96 111*        Hepatic Function Panel:   Lab Results   Component Value Date    ALKPHOS 258 04/09/2021    ALT 19 04/09/2021    AST 23 04/09/2021    PROT 7.4 04/09/2021    PROT 7.4 08/21/2017    BILITOT 0.6 04/09/2021    BILIDIR <0.2 04/09/2021    IBILI see below 04/09/2021    LABALBU 2.7 04/09/2021       CPK: No results found for: CKTOTAL  ESR: No results found for: SEDRATE  CRP: No results found for: CRP        Imaging: All pertinent images and reports for the current visit were reviewed by me during this visit. No orders to display       Medications: All current and past medications were reviewed.      cefTRIAXone (ROCEPHIN) IV  2,000 mg Intravenous Q24H    enoxaparin  40 mg Subcutaneous Daily    pantoprazole  40 mg Oral QAM AC        sodium chloride 100 mL/hr at 04/12/21 0410       diphenoxylate-atropine, acetaminophen, ondansetron      Problem list:       Patient Active Problem List   Diagnosis Code    Malignant neoplasm of ascending colon ([facility] C18.2    Colon cancer metastasized to liver ([facility] C18.9, C78.7    Chemotherapy-induced neutropenia (HCC) D70.1, T45.1X5A    Chemotherapy-induced thrombocytopenia D69.59, T45.1X5A    Cancer of ascending colon ([facility] C18.2    Right lower lobe lung mass R91.8    Lung mass R91.8    Liver lesion K76.9    Streptococcal bacteremia R78.81, B95.5    Metastasis from colon cancer ([facility] C79.9, C18.9    Essential hypertension I10    Alkaline phosphatase elevation R74.8    Non-smoker Z78.9    Severe malnutrition (HCC) E43    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       Please note that this chart was generated using Dragon dictation software. Although every effort was made to ensure the accuracy of this automated transcription, some errors in transcription may have occurred inadvertently. If you may need any clarification, please do not hesitate to contact me through EPIC or at the phone number provided below with my electronic signature. Any pictures or media included in this note were obtained after taking informed verbal consent from the patient and with their approval to include those in the patient's medical record.     Neo Cochran MD, MPH  4/12/2021 , 12:50 PM   [facility] Infectious Disease   [address] [place] [address]  Office: [phone]  Fax: [phone]  Clinic days:  Tuesday & Thursday

## 2021-04-12 NOTE — PLAN OF CARE
Problem: Nutrition  Goal: Optimal nutrition therapy  Outcome: Ongoing     Problem:  Activity:  Goal: Energy level will increase  Description: Energy level will increase  Outcome: Ongoing

## 2021-04-12 NOTE — PROGRESS NOTES
[facility]cology [facility]   Progress Note      4/12/2021 9:04 AM        Name: Astrid Richard . Admitted: 4/8/2021    SUBJECTIVE:  He is feeling well, offers no complaints. No cough or SOB. Reviewed interval ancillary notes    Current Medications  cefTRIAXone (ROCEPHIN) 2000 mg IVPB in D5W 50ml minibag, Q24H  enoxaparin (LOVENOX) injection 40 mg, Daily  diphenoxylate-atropine (LOMOTIL) 2.5-0.025 MG per tablet 1 tablet, 4x Daily PRN  acetaminophen (TYLENOL) tablet 650 mg, Q6H PRN  0.9 % sodium chloride infusion, Continuous  ondansetron (ZOFRAN) injection 4 mg, Q6H PRN  pantoprazole (PROTONIX) tablet 40 mg, QAM AC        Objective:  /88   Pulse 90   Temp 97.8 °F (36.6 °C) (Oral)   Resp 18   Ht 5' 11\" (1.803 m)   Wt 180 lb (81.6 kg)   SpO2 98%   BMI 25.10 kg/m²     Intake/Output Summary (Last 24 hours) at 4/12/2021 0904  Last data filed at 4/11/2021 1303  Gross per 24 hour   Intake 840 ml   Output --   Net 840 ml      Wt Readings from Last 3 Encounters:   04/08/21 180 lb (81.6 kg)   03/10/21 181 lb 12.8 oz (82.5 kg)   02/08/21 179 lb (81.2 kg)       General appearance:  Appears comfortable  Eyes: Sclera clear. Pupils equal.  ENT: Moist oral mucosa. Trachea midline, no adenopathy. Cardiovascular: Regular rhythm, normal S1, S2. No murmur. No edema in lower extremities  Respiratory: Not using accessory muscles. Good inspiratory effort. Clear to auscultation bilaterally, no wheeze or crackles. GI: Abdomen soft, no tenderness, not distended  Musculoskeletal: No cyanosis in digits, neck supple  Neurology: CN 2-12 grossly intact. No speech or motor deficits  Psych: Normal affect.  Alert and oriented in time, place and person  Skin: Warm, dry, normal turgor    Labs and Tests:  CBC:   Recent Labs     04/10/21  0515   WBC 3.5*   HGB 9.7*        BMP:    Recent Labs     04/10/21  0515 04/11/21  0621 04/12/21  0415    137 138   K 3.6 3.6 3.7    104 106   CO2 26 28 28   BUN 5* 5* 7 CREATININE 0.6* 0.6* <0.5*   GLUCOSE 104* 96 111*     Hepatic: No results for input(s): AST, ALT, ALB, BILITOT, ALKPHOS in the last 72 hours. ASSESSMENT AND PLAN    Active Problems:    Positive blood cultures    Metastasis from colon cancer [facility])    Essential hypertension    Alkaline phosphatase elevation    Non-smoker    Severe malnutrition (HCC)  Resolved Problems:    * No resolved hospital problems. *      1. Stage IV colon cancer:   -Has been treated with surgery, chemotherapy, radiation to metastatic lesions.  -Currently being monitored.     2. Gram-positive cocci bacteremia:   -This was noted after he was evaluated in the office for fever  -Was hospitalized on 4/8/2021 and has been on IV vancomycin. -ID consult appreciated.     - Blood cultures done from the office 1/2 showed Streptococcus sanguinous with actinomyces odontolyticus  - ID following, started ceftriaxone 4/11/21, plan to treat x 4 weeks.      3. Anemia:   - Iron studies are consistent with iron deficiency and anemia of chronic disease. No evidence of B12 deficiency  - Started oral iron supplements     4.  Diarrhea:   -C. difficile NEGATIVE     5. DVT prophylaxis    Maribel Walker CNP  [facility]    Patient was seen and examined. Agree with above. Continue current care. Hopefully home soon with home health care for IV antibiotics.     Bonifacio Malhotra MD

## 2021-04-12 NOTE — CARE COORDINATION
Referral received to check IV Benefits. Patients benefit information is as follows: Therapy:Ceftriaxone 2g Q24  Deductible:$3000    Amt Met:$3000  Co-Insurance %:90/10  OOP:$6000     Amt Met:$3006    Pt.  Copay:$7/day for Ceftriaxone and per taylor, $11/visit for nursing  [facility] has accepted the referral.    Electronically signed by Dori Hale on 4/12/2021 at 11:35 AM   Cell Ph# [phone], Office # [phone]

## 2021-04-12 NOTE — PROGRESS NOTES
[address]  Requested to check home infusion benefits. Referral to [facility] [phone] to run benefits per payer. [facility] will assign home care agency. Discharge planner notified.

## 2021-04-12 NOTE — DISCHARGE INSTR - COC
Continuity of Care Form    Patient Name: Swathi Tyler   :  1960  MRN:  [id number]    Admit date:  2021  Discharge date:  21      Code Status Order: Full Code   Advance Directives:   Advance Care Flowsheet Documentation       Date/Time Healthcare Directive Type of Healthcare Directive Copy in [address] Agent's Name Healthcare Agent's Phone Number    [phone]  Yes, patient has an advance directive for healthcare treatment  --  No, copy requested from medical records  --  --  --            Admitting Physician:  Steve Han MD  PCP: Charis Cohen MD    Discharging Nurse: [facility] Unit/Room#: 0QG-7406/1274-87  Discharging Unit Phone Number: [phone]    Emergency Contact:   Extended Emergency Contact Information  Primary Emergency Contact: Gladys Garcia  Address: [address] [place] [address] Phone: [phone]  Mobile Phone: [phone]  Relation: Spouse  Secondary Emergency Contact: Cristofer Vicente, [address] [address] Phone: [phone]  Relation: Other    Past Surgical History:  Past Surgical History:   Procedure Laterality Date    COLONOSCOPY      CYST REMOVAL  1973    Behind right knee    LIVER BIOPSY Right 10/05/2017    phalen MD at [facility] in specials as outpt    OTHER SURGICAL HISTORY  2018     Robotic assisted converted to open right colectomy with anastomosis, greater omental vascularized pedicled flap creation    THORACOSCOPY Right 2018    RIGHT VIDEO ASSISTED THORACOSCOPY WITH WEDGE EXCISION RIGHT    TUNNELED VENOUS PORT PLACEMENT Left 2017    UPPER GASTROINTESTINAL ENDOSCOPY      WISDOM TOOTH EXTRACTION         Immunization History: There is no immunization history on file for this patient.     Active Problems:  Patient Active Problem List   Diagnosis Code    Malignant neoplasm of ascending colon ([address] C18.2    Colon cancer metastasized to liver ([address] C18.9, C78.7    Chemotherapy-induced neutropenia (HCC) D70.1, T45.1X5A    Chemotherapy-induced thrombocytopenia D69.59, T45.1X5A    Cancer of ascending colon (HCC) C18.2    Right lower lobe lung mass R91.8    Lung mass R91.8    Liver lesion K76.9    Positive blood cultures R78.81    Metastasis from colon cancer (HCC) C79.9, C18.9    Essential hypertension I10    Alkaline phosphatase elevation R74.8    Non-smoker Z78.9    Severe malnutrition (HCC) E43       Isolation/Infection:   Isolation            No Isolation          Patient Infection Status       Infection Onset Added Last Indicated Last Indicated By Review Planned Expiration Resolved Resolved By    None active    Resolved    C-diff Rule Out 04/10/21 04/10/21 04/10/21 Clostridium difficile toxin/antigen (Ordered)   04/10/21 Rule-Out Test Resulted            Nurse Assessment:  Last Vital Signs: /88   Pulse 90   Temp 97.8 °F (36.6 °C) (Oral)   Resp 18   Ht 5' 11\" (1.803 m)   Wt 180 lb (81.6 kg)   SpO2 98%   BMI 25.10 kg/m²     Last documented pain score (0-10 scale): Pain Level: 0  Last Weight:   Wt Readings from Last 1 Encounters:   04/08/21 180 lb (81.6 kg)     Mental Status:  oriented and alert    IV Access:  Port    Nursing Mobility/ADLs:  Walking   Independent  Transfer  Independent  Bathing  Independent  Dressing  Independent  Toileting  Independent  Feeding  Independent  Med [address]  Med Delivery   whole    Wound Care Documentation and Therapy:  Incision 04/16/18 Chest Right (Active)   Number of days: 2174        Elimination:  Continence: Bowel: Yes  Bladder: Yes  Urinary Catheter: None   Colostomy/Ileostomy/Ileal Conduit: No       Date of Last BM: 04/12/21      Intake/Output Summary (Last 24 hours) at 4/12/2021 0848  Last data filed at 4/11/2021 1303  Gross per 24 hour   Intake 840 ml   Output --   Net 840 ml     I/O last 3 completed shifts:   In: 840 [P.O.:840]  Out: -     Safety Concerns:     None    Impairments/Disabilities: and stop date for IV ceftriaxone will be 5/7/2021     Lab monitoring: CBC, Chem 12once a week, to be       collected every Monday or Tuesday morning until the patient is off IV  antibiotics. Fax weekly lab results to Marine Rob MD's office at        [phone]. Please maintain PICC line until the patient is on IV antibiotics. ** Please notify Marine Rob MD's office with any change in patient's        status, transfer out of facility or to hospital by calling [phone]           Outpatient Follow up:No outpatient ID f/u needed if continues to do well. Due to COVID 19 pandemic, if needed, a f/u video visit can be arranged via my office at patient's request on as-needed basis. Physician Signature:  Electronically signed by Marine Rob MD on                                                      4/13/21 at 3:29 PM EDT         Physician Certification: I certify the above information and transfer of Swathi Tyler  is necessary for the continuing treatment of the diagnosis listed and that he requires [address] for less 30 days.      Update Admission H&P: No change in H&P    PHYSICIAN SIGNATURE:  Electronically signed by VIJAY Lott CNP on 4/14/21 at 10:27 AM EDT

## 2021-04-13 VITALS
DIASTOLIC BLOOD PRESSURE: 95 MMHG | WEIGHT: 180 LBS | HEART RATE: 106 BPM | RESPIRATION RATE: 16 BRPM | TEMPERATURE: 99.2 F | HEIGHT: 71 IN | SYSTOLIC BLOOD PRESSURE: 147 MMHG | BODY MASS INDEX: 25.2 KG/M2 | OXYGEN SATURATION: 99 %

## 2021-04-13 LAB
ANION GAP SERPL CALCULATED.3IONS-SCNC: 6 MMOL/L (ref 3–16)
BASOPHILS ABSOLUTE: 0 K/UL (ref 0–0.2)
BASOPHILS RELATIVE PERCENT: 0.5 %
BLOOD CULTURE, ROUTINE: NORMAL
BUN BLDV-MCNC: 5 MG/DL (ref 7–20)
CALCIUM SERPL-MCNC: 8.5 MG/DL (ref 8.3–10.6)
CHLORIDE BLD-SCNC: 106 MMOL/L (ref 99–110)
CO2: 27 MMOL/L (ref 21–32)
CREAT SERPL-MCNC: 0.6 MG/DL (ref 0.8–1.3)
CULTURE, BLOOD 3: NORMAL
EOSINOPHILS ABSOLUTE: 0.1 K/UL (ref 0–0.6)
EOSINOPHILS RELATIVE PERCENT: 1.4 %
GFR AFRICAN AMERICAN: >60
GFR NON-AFRICAN AMERICAN: >60
GLUCOSE BLD-MCNC: 110 MG/DL (ref 70–99)
HCT VFR BLD CALC: 26.5 % (ref 40.5–52.5)
HEMOGLOBIN: 9.2 G/DL (ref 13.5–17.5)
LV EF: 58 %
LVEF MODALITY: NORMAL
LYMPHOCYTES ABSOLUTE: 0.5 K/UL (ref 1–5.1)
LYMPHOCYTES RELATIVE PERCENT: 14.4 %
MCH RBC QN AUTO: 35.2 PG (ref 26–34)
MCHC RBC AUTO-ENTMCNC: 34.7 G/DL (ref 31–36)
MCV RBC AUTO: 101.4 FL (ref 80–100)
MONOCYTES ABSOLUTE: 0.5 K/UL (ref 0–1.3)
MONOCYTES RELATIVE PERCENT: 12.8 %
NEUTROPHILS ABSOLUTE: 2.7 K/UL (ref 1.7–7.7)
NEUTROPHILS RELATIVE PERCENT: 70.9 %
PDW BLD-RTO: 14.2 % (ref 12.4–15.4)
PLATELET # BLD: 165 K/UL (ref 135–450)
PMV BLD AUTO: 6.6 FL (ref 5–10.5)
POTASSIUM REFLEX MAGNESIUM: 3.9 MMOL/L (ref 3.5–5.1)
RBC # BLD: 2.62 M/UL (ref 4.2–5.9)
SODIUM BLD-SCNC: 139 MMOL/L (ref 136–145)
WBC # BLD: 3.8 K/UL (ref 4–11)

## 2021-04-13 PROCEDURE — 93306 TTE W/DOPPLER COMPLETE: CPT

## 2021-04-13 PROCEDURE — 6370000000 HC RX 637 (ALT 250 FOR IP): Performed by: INTERNAL MEDICINE

## 2021-04-13 PROCEDURE — 6360000002 HC RX W HCPCS: Performed by: INTERNAL MEDICINE

## 2021-04-13 PROCEDURE — 2580000003 HC RX 258: Performed by: INTERNAL MEDICINE

## 2021-04-13 PROCEDURE — 99233 SBSQ HOSP IP/OBS HIGH 50: CPT | Performed by: INTERNAL MEDICINE

## 2021-04-13 PROCEDURE — 85025 COMPLETE CBC W/AUTO DIFF WBC: CPT

## 2021-04-13 PROCEDURE — 80048 BASIC METABOLIC PNL TOTAL CA: CPT

## 2021-04-13 RX ORDER — AMOXICILLIN 500 MG/1
500 CAPSULE ORAL EVERY 8 HOURS SCHEDULED
Qty: 42 CAPSULE | Refills: 0 | Status: SHIPPED | OUTPATIENT
Start: 2021-04-13 | End: 2021-04-27

## 2021-04-13 RX ADMIN — AMOXICILLIN 500 MG: 250 CAPSULE ORAL at 15:55

## 2021-04-13 RX ADMIN — CEFTRIAXONE 2000 MG: 2 INJECTION, POWDER, FOR SOLUTION INTRAMUSCULAR; INTRAVENOUS at 17:45

## 2021-04-13 RX ADMIN — AMOXICILLIN 500 MG: 250 CAPSULE ORAL at 06:18

## 2021-04-13 RX ADMIN — SODIUM CHLORIDE: 9 INJECTION, SOLUTION INTRAVENOUS at 01:21

## 2021-04-13 RX ADMIN — PANTOPRAZOLE SODIUM 40 MG: 40 TABLET, DELAYED RELEASE ORAL at 06:19

## 2021-04-13 ASSESSMENT — ENCOUNTER SYMPTOMS
DIARRHEA: 0
NAUSEA: 0
RHINORRHEA: 0
EYE REDNESS: 0
TROUBLE SWALLOWING: 0
WHEEZING: 0
EYE DISCHARGE: 0
ABDOMINAL PAIN: 0
SORE THROAT: 0
BACK PAIN: 0
SHORTNESS OF BREATH: 0
CONSTIPATION: 0
COUGH: 0

## 2021-04-13 ASSESSMENT — PAIN SCALES - GENERAL
PAINLEVEL_OUTOF10: 0

## 2021-04-13 ASSESSMENT — PAIN DESCRIPTION - PROGRESSION
CLINICAL_PROGRESSION: NOT CHANGED

## 2021-04-13 NOTE — PROGRESS NOTES
Infectious Diseases   Progress Note      Admission Date: 4/8/2021  Hospital Day: Hospital Day: 6   Attending: Hannah Carmen MD  Date of service: 4/13/2021     Chief complaint/ Reason for consult:     · Positive blood culture, concern for bacteremia -1 set was positive for Streptococcus anginosus, 1 set positive for actinomyces  · History of penicillin allergy  · Port-A-Cath in place in the left side of the chest  · Metastatic colon cancer with liver and lung metastasis    Microbiology:        I have reviewed allavailable micro lab data and cultures    · Blood culture (2/2) - collected on 4/8/2021: Negative so far  · Blood culture  - collected on 4/7/2021: 1 set positive for Streptococcus, 1 set positive for actinomyces    Care Everywhere Result Report  Culture, Blood/Bone Marrow Spec Type - BloodResulted: 4/10/2021 9:15 AM  [facility]  Component Name Value Ref Range   Blood Culture Streptococcus sanguinis (A)     Blood Culture Actinomyces odontolyticus (A)     Specimen Collected on   Blood - Blood, Venous 4/7/2021 12:19 PM       Antibiotics and immunizations:       Current antibiotics: All antibiotics and their doses were reviewed by me    Recent Abx Admin                   amoxicillin (AMOXIL) capsule 500 mg (mg) 500 mg Given 04/13/21 0618     500 mg Given 04/12/21 2220     500 mg Given  1654    cefTRIAXone (ROCEPHIN) 2000 mg IVPB in D5W 50ml minibag (mg) 2,000 mg New Bag 04/12/21 1816                  Immunization History: All immunization history was reviewed by me today. There is no immunization history on file for this patient. Known drug allergies: All allergies were reviewed and updated    Allergies   Allergen Reactions    Potassium-Containing Compounds Rash       Social history:     Social History:  All social andepidemiologic history was reviewed and updated by me today as needed. · Tobacco use:   reports that he has never smoked.  He has never used smokeless tobacco.  · Alcohol use:   reports current alcohol use of about 2.0 standard drinks of alcohol per week. · Currently lives in: [address] [place], [place] [address]. [address]  ·  reports no history of drug use. Assessment:     The patient is a 57 y.o. old male who  has a past medical history of Anemia, Colon cancer ([address] Hypertension, and Lung nodule. with following problems:    · Positive blood culture, concern for bacteremia -1 set was positive for Streptococcus anginosus, 1 set positive for actinomyces-currently ceftriaxone amoxicillin  · History of penicillin allergy  · Port-A-Cath in place in the left side of the chest  · Metastatic colon cancer with liver and lung metastasis  · History of chemoradiation therapy  · Essential hypertension-blood pressure okay  · Non-smoker  · Elevated alkaline phosphatase          Discussion:      The patient is on IV ceftriaxone. I had also put him on oral amoxicillin yesterday to better cover for actinomyces. 2D echo has been done today and is in process    Serum creatinine 0.6 today. Repeat blood cultures from yesterday are in process. He is tolerating amoxicillin okay. Plan:     Diagnostic Workup:      · Continue to follow  fever curve, WBC count and blood cultures. · Continue to monitor blood counts, liver and renal function. Antimicrobials:    · He is tolerating amoxicillin well. Hence, will take penicillin off his allergy list  · W continue IV ceftriaxone 2 g every 24 hour  · If 2D echo does not show any endocarditis, tentative antibiotic plan will be as follows  · We will follow up on the culture results and clinical progress and will make further recommendations accordingly. · Continue close vitals monitoring. · Maintain good glycemic control. · Fall precautions. Aspiration precautions. · Continue to watch for new fever or diarrhea. · DVT prophylaxis. · Discussed all above with patient and RN.     ANGELA has been updated with infusion orders as follows: Out-patient antibiotic therapy (OPAT)/ Antibiotic Infusion orders          Diagnosis: Positive blood culture for Streptococcus viridans, actinomyces       Organism/ culture: See above     Name and dose of Antimicrobial: *IV ceftriaxone 2 g every 24 hour, oral amoxicillin 500 mg every 8 hour     Antimicrobial start date: calculated from 4/8/2021     Antimicrobial completion date planned: Stop date for oral amoxicillin will be 4/26/2021 and stop date for IV ceftriaxone will be 5/7/2021     Lab monitoring: CBC, Chem 12once a week, to be       collected every Monday or Tuesday morning until the patient is off IV  antibiotics. Fax weekly lab results to Pina Morley MD's office at        [phone]. Please maintain PICC line until the patient is on IV antibiotics. ** Please notify Pina Morley MD's office with any change in patient's        status, transfer out of facility or to hospital by calling [phone]           Outpatient Follow up:No outpatient ID f/u needed if continues to do well. Due to COVID 19 pandemic, if needed, a f/u video visit can be arranged via my office at patient's request on as-needed basis. Physician Signature:  Electronically signed by Pina Morley MD on                                                      4/13/21 at 3:29 PM EDT             Drug Monitoring:    · Continue monitoring for antibiotic toxicity as follows: CBC, CMP  · Continue to watch for following: new or worsening fever, new hypotension, hives, lip swelling and redness or purulence at vascular access sites. I/v access Management:    · Continue to monitor i.v access sites for erythema, induration, discharge or tenderness. · As always, continue efforts to minimize tubes/lines/drains as clinically appropriate to reduce chances of line associated infections.     Patient education and counseling:        · The patient was educated in detail about the side-effects of various antibiotics and things to watch for like new rashes, lip swelling, severe reaction, worsening diarrhea, break through fever etc.  · Discussed patient's condition and what to expect. All of the patient's questions were addressed in a satisfactory manner and patient verbalized understanding all instructions. Level of complexity of visit: High     TIME SPENT TODAY:     - Spent over  36  minutes on visit (including interval history, physical exam, review of data including labs, cultures, imaging, development and implementation of treatment plan and coordination of complex care). More than 50 percent of this includes face-to-face time spent with the patient for counseling and coordination of care. Thank you for involving me in the care of your patient. I will continue to follow. If you have anyadditional questions, please do not hesitate to contact me. Subjective: Interval history: Interval history was obtained from chart review and patient/ RN. He is afebrile. He is tolerating antibiotics okay. No diarrhea     REVIEW OF SYSTEMS:      Review of Systems   Constitutional: Negative for chills, diaphoresis and fever. HENT: Negative for ear discharge, ear pain, rhinorrhea, sore throat and trouble swallowing. Eyes: Negative for discharge and redness. Respiratory: Negative for cough, shortness of breath and wheezing. Cardiovascular: Negative for chest pain and leg swelling. Gastrointestinal: Negative for abdominal pain, constipation, diarrhea and nausea. Endocrine: Negative for polyuria. Genitourinary: Negative for dysuria, flank pain, frequency, hematuria and urgency. Musculoskeletal: Negative for back pain and myalgias. Skin: Negative for rash. Neurological: Negative for dizziness, seizures and headaches. Hematological: Does not bruise/bleed easily. Psychiatric/Behavioral: Negative for hallucinations and suicidal ideas.    All other systems reviewed and are negative. Past Medical History: All past medical history reviewed today. Past Medical History:   Diagnosis Date    Anemia     Colon cancer ([address]     LAST CHEMO 2/5/18    Hypertension     Lung nodule     metastatic        Past Surgical History: All past surgical history was reviewed today. Past Surgical History:   Procedure Laterality Date    COLONOSCOPY      CYST REMOVAL  1973    Behind right knee    LIVER BIOPSY Right 10/05/2017    [facility]en [facility] at [facility] in specials as outpt    OTHER SURGICAL HISTORY  03/19/2018     Robotic assisted converted to open right colectomy with anastomosis, greater omental vascularized pedicled flap creation    THORACOSCOPY Right 04/16/2018    RIGHT VIDEO ASSISTED THORACOSCOPY WITH WEDGE EXCISION RIGHT    TUNNELED VENOUS PORT PLACEMENT Left 05/30/2017    UPPER GASTROINTESTINAL ENDOSCOPY      WISDOM TOOTH EXTRACTION         Family History: All family history was reviewed today. Problem Relation Age of Onset    Cancer Father         Skin (melanoma)    Mult Sclerosis Mother     No Known Problems Brother        Objective:       PHYSICAL EXAM:      Vitals:   Vitals:    04/13/21 0000 04/13/21 0433 04/13/21 0900 04/13/21 1336   BP: 126/83 (!) 148/82 133/82 132/79   Pulse: 93 94 93 103   Resp: 14 15 16 16   Temp: 99.3 °F (37.4 °C) 98.3 °F (36.8 °C) 97.6 °F (36.4 °C) 98.4 °F (36.9 °C)   TempSrc: Oral Oral Oral Oral   SpO2: 96% 96% 99% 99%   Weight:       Height:           Physical Exam  Vitals signs and nursing note reviewed. Constitutional:       Appearance: Normal appearance. He is well-developed. HENT:      Head: Normocephalic and atraumatic. Right Ear: External ear normal.      Left Ear: External ear normal.      Nose: Nose normal. No congestion or rhinorrhea. Mouth/Throat:      Mouth: Mucous membranes are moist.      Pharynx: No oropharyngeal exudate or posterior oropharyngeal erythema. Eyes:      General: No scleral icterus.         Right eye: No discharge. Left eye: No discharge. Conjunctiva/sclera: Conjunctivae normal.      Pupils: Pupils are equal, round, and reactive to light. Neck:      Musculoskeletal: Normal range of motion and neck supple. No neck rigidity or muscular tenderness. Cardiovascular:      Rate and Rhythm: Normal rate and regular rhythm. Pulses: Normal pulses. Heart sounds: No murmur. No friction rub. Pulmonary:      Effort: Pulmonary effort is normal. No respiratory distress. Breath sounds: Normal breath sounds. No stridor. No wheezing, rhonchi or rales. Abdominal:      General: Bowel sounds are normal.      Palpations: Abdomen is soft. Tenderness: There is no abdominal tenderness. There is no right CVA tenderness, left CVA tenderness, guarding or rebound. Musculoskeletal: Normal range of motion. General: No swelling or tenderness. Lymphadenopathy:      Cervical: No cervical adenopathy. Skin:     General: Skin is warm and dry. Coloration: Skin is not jaundiced. Findings: No erythema or rash. Neurological:      General: No focal deficit present. Mental Status: He is alert and oriented to person, place, and time. Mental status is at baseline. Motor: No abnormal muscle tone. Psychiatric:         Mood and Affect: Mood normal.         Behavior: Behavior normal.         Thought Content: Thought content normal.           Lines: All vascular access sites are healthy with no local erythema, discharge or tenderness. Intake and output:    I/O last 3 completed shifts: In: 12 [P.O.:960]  Out: -     Lab Data:   All available labs and old records have been reviewed by me.     CBC:  Recent Labs     04/13/21  0623   WBC 3.8*   RBC 2.62*   HGB 9.2*   HCT 26.5*      .4*   MCH 35.2*   MCHC 34.7   RDW 14.2        BMP:  Recent Labs     04/11/21  0621 04/12/21  0415 04/13/21  0623    138 139   K 3.6 3.7 3.9    106 106   CO2 28 28 27   BUN 5* 7 5* CREATININE 0.6* <0.5* 0.6*   CALCIUM 8.1* 8.2* 8.5   GLUCOSE 96 111* 110*        Hepatic Function Panel:   Lab Results   Component Value Date    ALKPHOS 258 04/09/2021    ALT 19 04/09/2021    AST 23 04/09/2021    PROT 7.4 04/09/2021    PROT 7.4 08/21/2017    BILITOT 0.6 04/09/2021    BILIDIR <0.2 04/09/2021    IBILI see below 04/09/2021    LABALBU 2.7 04/09/2021       CPK: No results found for: CKTOTAL  ESR: No results found for: SEDRATE  CRP: No results found for: CRP        Imaging: All pertinent images and reports for the current visit were reviewed by me during this visit. No orders to display       Medications: All current and past medications were reviewed.  amoxicillin  500 mg Oral 3 times per day    cefTRIAXone (ROCEPHIN) IV  2,000 mg Intravenous Q24H    enoxaparin  40 mg Subcutaneous Daily    pantoprazole  40 mg Oral QAM AC        sodium chloride 100 mL/hr at 04/13/21 0121       diphenoxylate-atropine, acetaminophen, ondansetron      Problem list:       Patient Active Problem List   Diagnosis Code    Malignant neoplasm of ascending colon ([facility] C18.2    Colon cancer metastasized to liver ([facility] C18.9, C78.7    Chemotherapy-induced neutropenia (HCC) D70.1, T45.1X5A    Chemotherapy-induced thrombocytopenia D69.59, T45.1X5A    Cancer of ascending colon ([facility] C18.2    Right lower lobe lung mass R91.8    Lung mass R91.8    Liver lesion K76.9    Positive blood cultures R78.81    Metastasis from colon cancer (HCC) C79.9, C18.9    Essential hypertension I10    Alkaline phosphatase elevation R74.8    Non-smoker Z78.9    Severe malnutrition (HCC) E43    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 Although every effort was made to ensure the accuracy of this automated transcription, some errors in transcription may have occurred inadvertently.  If you may need any clarification, please do not hesitate to contact me through [facility] or at the phone number provided below with my electronic signature. Any pictures or media included in this note were obtained after taking informed verbal consent from the patient and with their approval to include those in the patient's medical record.     Gautam Leroy MD, MPH  4/13/2021 , 3:31 PM   [facility] Infectious Disease   [address] [place] [address], [address] [place] [address]  Office: [phone]  Fax: [phone]  Clinic days:  Tuesday & Thursday

## 2021-04-13 NOTE — PROGRESS NOTES
Comprehensive Nutrition Assessment    Type and Reason for Visit:  Reassess    Nutrition Recommendations/Plan:   1. Continue current diet  2. Encourage po  3. Continue supplement    Nutrition Assessment:  Pt's intake has improved during admission with po now % at meals. Pt also consuming 100% of his supplement. Pt denies any n/v. Pt  wants supplement to continue, RD will continue supp. Pt denies any other needs at this time. Malnutrition Assessment:  Malnutrition Status:  Severe malnutrition    Context:  Chronic Illness     Findings of the 6 clinical characteristics of malnutrition:  Energy Intake:  7 - 75% or less estimated energy requirements for 1 month or longer  Weight Loss:  7 - Greater than 20% over 1 year     Body Fat Loss:  1 - Mild body fat loss Orbital, Triceps   Muscle Mass Loss:  1 - Mild muscle mass loss Temples (temporalis), Clavicles (pectoralis & deltoids)  Fluid Accumulation:  No significant fluid accumulation     Strength:  Not Performed    Estimated Daily Nutrient Needs:  Energy (kcal):  8173-8671 cals (20-25 jeni/82kg); Weight Used for Energy Requirements:  Current     Protein (g):  101-156 gms (1.3-2.0 gms/IBW 78kg); Weight Used for Protein Requirements:  Ideal        Fluid (ml/day):   ; Method Used for Fluid Requirements:  1 ml/kcal      Nutrition Related Findings:  BLE trace edema. I/o's: +6433      Wounds:  None       Current Nutrition Therapies:    DIET GENERAL;  Dietary Nutrition Supplements: Standard High Calorie Oral Supplement    Anthropometric Measures:  · Height: 5' 11\" (180.3 cm)  · Current Body Weight: 180 lb (81.6 kg)   · Usual Body Weight: 225 lb (102.1 kg)     · Ideal Body Weight: 172 lbs; % Ideal Body Weight     · BMI: 25.1  · BMI Categories: Overweight (BMI 25.0-29. 9)       Nutrition Diagnosis:   · Severe malnutrition related to inadequate protein-energy intake as evidenced by poor intake prior to admission, weight loss greater than or equal to 20% in 1

## 2021-04-13 NOTE — PLAN OF CARE
Nutrition Problem #1: Severe malnutrition  Intervention: Food and/or Nutrient Delivery: Continue Current Diet, Continue Oral Nutrition Supplement  Nutritional Goals: New goal- po to remain greater than 75% at meals/supp

## 2021-04-13 NOTE — PROGRESS NOTES
Assessment complete. See flowsheet. Patient resting in bed at this time. Pt complains of headache rated 1/10. Pt given Tylenol. Awaiting patient results. Respirations easy and even. Denies any further needs at this time.

## 2021-04-13 NOTE — PROGRESS NOTES
[facility]cology [facility]ology [facility]   Progress Note      4/13/2021 10:16 AM        Name: Consuelo Garrett . Admitted: 4/8/2021    SUBJECTIVE:  He is feeling well, offers no complaints. No cough or SOB. No fevers. Planning on having ECHO today    Reviewed interval ancillary notes    Current Medications  amoxicillin (AMOXIL) capsule 500 mg, 3 times per day  cefTRIAXone (ROCEPHIN) 2000 mg IVPB in D5W 50ml minibag, Q24H  enoxaparin (LOVENOX) injection 40 mg, Daily  diphenoxylate-atropine (LOMOTIL) 2.5-0.025 MG per tablet 1 tablet, 4x Daily PRN  acetaminophen (TYLENOL) tablet 650 mg, Q6H PRN  0.9 % sodium chloride infusion, Continuous  ondansetron (ZOFRAN) injection 4 mg, Q6H PRN  pantoprazole (PROTONIX) tablet 40 mg, QAM AC        Objective:  /82   Pulse 93   Temp 97.6 °F (36.4 °C) (Oral)   Resp 16   Ht 5' 11\" (1.803 m)   Wt 180 lb (81.6 kg)   SpO2 99%   BMI 25.10 kg/m²     Intake/Output Summary (Last 24 hours) at 4/13/2021 1016  Last data filed at 4/13/2021 0902  Gross per 24 hour   Intake 480 ml   Output --   Net 480 ml      Wt Readings from Last 3 Encounters:   04/08/21 180 lb (81.6 kg)   03/10/21 181 lb 12.8 oz (82.5 kg)   02/08/21 179 lb (81.2 kg)       General appearance:  Appears comfortable  Eyes: Sclera clear. Pupils equal.  ENT: Moist oral mucosa. Trachea midline, no adenopathy. Cardiovascular: Regular rhythm, normal S1, S2. No murmur. No edema in lower extremities  Respiratory: Not using accessory muscles. Good inspiratory effort. Clear to auscultation bilaterally, no wheeze or crackles. GI: Abdomen soft, no tenderness, not distended  Musculoskeletal: No cyanosis in digits, neck supple  Neurology: CN 2-12 grossly intact. No speech or motor deficits  Psych: Normal affect.  Alert and oriented in time, place and person  Skin: Warm, dry, normal turgor    Labs and Tests:  CBC:   Recent Labs     04/13/21  0623   WBC 3.8*   HGB 9.2*        BMP:    Recent Labs     04/11/21  0320 04/12/21  0415 04/13/21  0623    138 139   K 3.6 3.7 3.9    106 106   CO2 28 28 27   BUN 5* 7 5*   CREATININE 0.6* <0.5* 0.6*   GLUCOSE 96 111* 110*     Hepatic: No results for input(s): AST, ALT, ALB, BILITOT, ALKPHOS in the last 72 hours. ASSESSMENT AND PLAN    Active Problems:    Streptococcal bacteremia    Metastasis from colon cancer [facility])    Essential hypertension    Alkaline phosphatase elevation    Non-smoker    Severe malnutrition (HCC)    Actinomyces infection  Resolved Problems:    * No resolved hospital problems. *      1. Stage IV colon cancer:   -Has been treated with surgery, chemotherapy, radiation to metastatic lesions.  -Currently being monitored.     2. Gram-positive cocci bacteremia:   -This was noted after he was evaluated in the office for fever  -Was hospitalized on 4/8/2021 and has been on IV vancomycin. -ID consult appreciated. - ECHO pending     - Blood cultures done from the office 1/2 showed Streptococcus sanguinous with actinomyces odontolyticus  - ID following, started ceftriaxone 4/11/21, plan to treat x 4 weeks.      3. Anemia:   - Iron studies are consistent with iron deficiency and anemia of chronic disease. No evidence of B12 deficiency  - Started oral iron supplements     4.  Diarrhea:   -C. difficile NEGATIVE     5. DVT prophylaxis    Hopefully home with home care for IV antibiotics, possibly tomorrow.      Lily Amado, [address] [facility]  [facility]

## 2021-04-14 NOTE — CARE COORDINATION
CM noted that patient discharged yesterday evening after CM had left. CM notified Steven/ Kimani and Zoila/ [facility] of the time of last IV medication. CM messaged oncology MD for home care orders for IV infusions.     Meena Beltran, MELISSAN, CCM, RN  [facility]  [phone]

## 2021-04-19 LAB
BASOPHILS ABSOLUTE: 0 THOU/MCL (ref 0–0.2)
BASOPHILS ABSOLUTE: 1 %
EOSINOPHILS ABSOLUTE: 0 THOU/MCL (ref 0.03–0.45)
EOSINOPHILS RELATIVE PERCENT: 1 %
HCT VFR BLD CALC: 32.4 % (ref 40–50)
HEMOGLOBIN: 11.3 G/DL (ref 13.5–16.5)
LYMPHOCYTES ABSOLUTE: 0.8 THOU/MCL (ref 1–4)
LYMPHOCYTES RELATIVE PERCENT: 13 %
MCH RBC QN AUTO: 34.4 PG (ref 27–33)
MCHC RBC AUTO-ENTMCNC: 34.9 G/DL (ref 32–36)
MCV RBC AUTO: 98.3 FL (ref 82–97)
MONOCYTES # BLD: 10 %
MONOCYTES ABSOLUTE: 0.6 THOU/MCL (ref 0.2–0.9)
NEUTROPHILS ABSOLUTE: 5 THOU/MCL (ref 1.8–7.7)
PDW BLD-RTO: 13.9 % (ref 12.3–17)
PLATELET # BLD: 242 THOU/MCL (ref 140–375)
PMV BLD AUTO: 7 FL (ref 7.4–11.5)
RBC # BLD: 3.3 MIL/MCL (ref 4.4–5.8)
SEG NEUTROPHILS: 75 %
WBC # BLD: 6.5 THOU/MCL (ref 3.6–10.5)

## 2021-04-20 NOTE — DISCHARGE SUMMARY
DISCHARGE SUMMARY NOTE    Patient ID: Jeanine Mancia 57 y.o. 1960     Admission Date: 4/8/2021     Discharge Date: 4/13/2021     Admission Diagnoses: Bacteremia [R78.81]    Discharge Diagnoses:   Patient Active Problem List   Diagnosis    [facility]ignant [facility]    Colon cancer metastasized to liver ([facility]    Chemotherapy-induced neutropenia (HCC)    Chemotherapy-induced thrombocytopenia    Cancer of ascending colon ([facility]    Right lower lobe lung mass    Lung mass    Liver lesion    Positive blood cultures    Metastasis from colon cancer ([facility]    Essential hypertension    Alkaline phosphatase elevation    Non-smoker    Severe malnutrition (HCC)    Actinomyces infection    Receiving intravenous antibiotic treatment as outpatient        Procedures:  None    Consultations: ID    Allergies: Potassium-containing compounds    Discharge Medications:       Medication List      START taking these medications    amoxicillin 500 MG capsule  Commonly known as: AMOXIL  Take 1 capsule by mouth every 8 hours for 14 days        CONTINUE taking these medications    lidocaine-prilocaine 2.5-2.5 % cream  Commonly known as: EMLA  Apply a thick layer over the port 30 minutes prior to treatment and cover with saran wrap.      Lomotil 2.5-0.025 MG per tablet  Generic drug: diphenoxylate-atropine     Magic Mouthwash  Commonly known as: Miracle Mouthwash        STOP taking these medications    vitamin C 500 MG tablet  Commonly known as: ASCORBIC ACID           Where to Get Your Medications      These medications were sent to [facility] [address] [facility] , [address] [place] [address] 75 - F [phone]  [address] [place] [address], [address] [place] [address] Ave. [address]    Phone: [phone]   · amoxicillin 500 MG capsule          Vital Signs: BP (!) 147/95   Pulse 106   Temp 99.2 °F (37.3 °C) (Oral)   Resp 16   Ht 5' 11\" (1.803 m)   Wt 180 lb (81.6 kg)   SpO2 99%   BMI 25.10 kg/m²

## 2021-04-26 ENCOUNTER — TELEPHONE (OUTPATIENT)
Dept: INFECTIOUS DISEASES | Age: 61
End: 2021-04-26

## 2021-04-26 NOTE — TELEPHONE ENCOUNTER
Spoke to patient, he took antibiotics with him. Will only miss one day of IV antibiotics since he will run out of supply, but will be back in time to resume doses on 5/5.  He still has plenty of PO antibiotics with him

## 2021-05-03 ENCOUNTER — HOSPITAL ENCOUNTER (OUTPATIENT)
Dept: CT IMAGING | Age: 61
Discharge: HOME | End: 2021-05-03
Payer: COMMERCIAL

## 2021-05-03 DIAGNOSIS — C18.2 MALIGNANT NEOPLASM OF ASCENDING COLON (HCC): ICD-10-CM

## 2021-05-03 LAB
ALBUMIN SERPL-MCNC: 2.8 G/DL (ref 3.5–5.7)
ALP BLD-CCNC: 199 IU/L (ref 35–135)
ALT SERPL-CCNC: 19 IU/L (ref 10–60)
ANION GAP SERPL CALCULATED.3IONS-SCNC: 6 MMOL/L (ref 6–18)
AST SERPL-CCNC: 35 IU/L (ref 10–40)
BASOPHILS ABSOLUTE: 0 THOU/MCL (ref 0–0.2)
BASOPHILS ABSOLUTE: 1 %
BILIRUB SERPL-MCNC: 0.6 MG/DL (ref 0–1.2)
BUN BLDV-MCNC: 3 MG/DL (ref 8–26)
CALCIUM SERPL-MCNC: 8.1 MG/DL (ref 8.5–10.4)
CHLORIDE BLD-SCNC: 106 MEQ/L (ref 98–111)
CO2: 29 MMOL/L (ref 21–31)
CREAT SERPL-MCNC: 0.71 MG/DL (ref 0.7–1.3)
EOSINOPHILS ABSOLUTE: 0 THOU/MCL (ref 0.03–0.45)
EOSINOPHILS RELATIVE PERCENT: 1 %
GFR AFRICAN AMERICAN: 114 ML/MIN/1.73 M2
GFR NON-AFRICAN AMERICAN: 99 ML/MIN/1.73 M2
GLUCOSE BLD-MCNC: 82 MG/DL (ref 70–99)
HCT VFR BLD CALC: 33 % (ref 40–50)
HEMOGLOBIN: 11.1 G/DL (ref 13.5–16.5)
LYMPHOCYTES ABSOLUTE: 0.6 THOU/MCL (ref 1–4)
LYMPHOCYTES RELATIVE PERCENT: 19 %
MCH RBC QN AUTO: 33.7 PG (ref 27–33)
MCHC RBC AUTO-ENTMCNC: 33.8 G/DL (ref 32–36)
MCV RBC AUTO: 99.9 FL (ref 82–97)
MONOCYTES # BLD: 11 %
MONOCYTES ABSOLUTE: 0.4 THOU/MCL (ref 0.2–0.9)
NEUTROPHILS ABSOLUTE: 2.3 THOU/MCL (ref 1.8–7.7)
PDW BLD-RTO: 15.4 % (ref 12.3–17)
PLATELET # BLD: 139 THOU/MCL (ref 140–375)
PMV BLD AUTO: 7.1 FL (ref 7.4–11.5)
POTASSIUM SERPL-SCNC: 3.5 MEQ/L (ref 3.6–5.1)
RBC # BLD: 3.3 MIL/MCL (ref 4.4–5.8)
SEG NEUTROPHILS: 68 %
SODIUM BLD-SCNC: 141 MEQ/L (ref 135–145)
TOTAL PROTEIN: 6.7 G/DL (ref 6–8)
WBC # BLD: 3.4 THOU/MCL (ref 3.6–10.5)

## 2021-05-03 PROCEDURE — 74177 CT ABD & PELVIS W/CONTRAST: CPT

## 2021-05-03 PROCEDURE — 6360000004 HC RX CONTRAST MEDICATION: Performed by: INTERNAL MEDICINE

## 2021-05-03 RX ADMIN — IOPAMIDOL 80 ML: 755 INJECTION, SOLUTION INTRAVENOUS at 10:57

## 2021-05-03 RX ADMIN — IOHEXOL 50 ML: 240 INJECTION, SOLUTION INTRATHECAL; INTRAVASCULAR; INTRAVENOUS; ORAL at 10:57

## 2021-05-06 ENCOUNTER — TELEPHONE (OUTPATIENT)
Dept: INFECTIOUS DISEASES | Age: 61
End: 2021-05-06

## 2021-05-06 NOTE — TELEPHONE ENCOUNTER
Spoke with patient, states he is doing well. Will give [facility] the order to discontinue IV antibiotics and de access port.

## 2021-06-28 ENCOUNTER — PATIENT MESSAGE (OUTPATIENT)
Dept: FAMILY MEDICINE CLINIC | Age: 61
End: 2021-06-28

## 2021-06-28 DIAGNOSIS — F41.9 ANXIETY: ICD-10-CM

## 2021-06-28 DIAGNOSIS — R10.9 ABDOMINAL CRAMPING: Primary | ICD-10-CM

## 2021-06-28 NOTE — TELEPHONE ENCOUNTER
From: HiGearan Alonso  To: Kris Lambert MD  Sent: 6/28/2021 11:04 AM EDT  Subject: Test Results Question    Latest Blood Test Results  Item  21-Jun  Albumin 2.6  Alkaline Phos 218  ALT 20  AST 24  Basos 0.02  Bilirubin total 0.8  Bun 8.3  Calcium 8.5  Chloride 107  Creatinine 0.72  EOS 0.06  Glucose 95  Hematocrit 33.3  Hemoglobin 10.6  Lymphs 1.08  Lymphs 14.8  MCH 29.1  MCHC 31.8  MCV 91.5  Monocytes 0.75  Monocytes 10.3  Neutrophilis 73.8  Neutrophilis 5.39  Platelets 073  Potasium 3.6  Protein 6.7  RBC 3.64  RDW 13.6  Sodium 141  WBC 7.3

## 2021-07-08 RX ORDER — DICYCLOMINE HYDROCHLORIDE 10 MG/1
10 CAPSULE ORAL 3 TIMES DAILY PRN
Qty: 30 CAPSULE | Refills: 3 | Status: ON HOLD
Start: 2021-07-08 | End: 2021-12-17 | Stop reason: HOSPADM

## 2021-07-08 RX ORDER — HYDROXYZINE PAMOATE 25 MG/1
25 CAPSULE ORAL 3 TIMES DAILY PRN
Qty: 30 CAPSULE | Refills: 0 | Status: SHIPPED | OUTPATIENT
Start: 2021-07-08 | End: 2021-08-07

## 2021-07-16 ENCOUNTER — HOSPITAL ENCOUNTER (OUTPATIENT)
Dept: CT IMAGING | Age: 61
Discharge: HOME | End: 2021-07-16
Payer: COMMERCIAL

## 2021-07-16 DIAGNOSIS — C18.2 MALIGNANT NEOPLASM OF ASCENDING COLON (HCC): ICD-10-CM

## 2021-07-16 LAB
GFR AFRICAN AMERICAN: >60
GFR NON-AFRICAN AMERICAN: >60
PERFORMED ON: ABNORMAL
POC CREATININE: 0.7 MG/DL (ref 0.8–1.3)
POC SAMPLE TYPE: ABNORMAL

## 2021-07-16 PROCEDURE — 82565 ASSAY OF CREATININE: CPT

## 2021-07-16 PROCEDURE — 6360000004 HC RX CONTRAST MEDICATION: Performed by: INTERNAL MEDICINE

## 2021-07-16 PROCEDURE — 74177 CT ABD & PELVIS W/CONTRAST: CPT

## 2021-07-16 RX ADMIN — IOHEXOL 50 ML: 240 INJECTION, SOLUTION INTRATHECAL; INTRAVASCULAR; INTRAVENOUS; ORAL at 12:43

## 2021-07-16 RX ADMIN — IOPAMIDOL 80 ML: 755 INJECTION, SOLUTION INTRAVENOUS at 12:43

## 2021-10-07 ENCOUNTER — HOSPITAL ENCOUNTER (OUTPATIENT)
Dept: CT IMAGING | Age: 61
Discharge: HOME | End: 2021-10-07
Payer: COMMERCIAL

## 2021-10-07 DIAGNOSIS — C78.01 SECONDARY MALIGNANT NEOPLASM OF RIGHT LUNG (HCC): ICD-10-CM

## 2021-10-07 DIAGNOSIS — C18.2 MALIGNANT NEOPLASM OF ASCENDING COLON (HCC): ICD-10-CM

## 2021-10-07 PROCEDURE — 6360000004 HC RX CONTRAST MEDICATION: Performed by: INTERNAL MEDICINE

## 2021-10-07 PROCEDURE — 74177 CT ABD & PELVIS W/CONTRAST: CPT

## 2021-10-07 RX ADMIN — IOPAMIDOL 80 ML: 755 INJECTION, SOLUTION INTRAVENOUS at 07:56

## 2021-10-07 RX ADMIN — IOHEXOL 50 ML: 240 INJECTION, SOLUTION INTRATHECAL; INTRAVASCULAR; INTRAVENOUS; ORAL at 07:56

## 2021-10-11 ENCOUNTER — HOSPITAL ENCOUNTER (INPATIENT)
Age: 61
LOS: 2 days | Discharge: HOME | DRG: 640 | End: 2021-10-14
Attending: EMERGENCY MEDICINE | Admitting: INTERNAL MEDICINE
Payer: COMMERCIAL

## 2021-10-11 DIAGNOSIS — E83.42 HYPOMAGNESEMIA: ICD-10-CM

## 2021-10-11 DIAGNOSIS — E87.6 HYPOKALEMIA: Primary | ICD-10-CM

## 2021-10-11 DIAGNOSIS — D61.818 PANCYTOPENIA (HCC): ICD-10-CM

## 2021-10-11 LAB
ANION GAP SERPL CALCULATED.3IONS-SCNC: 12 MMOL/L (ref 3–16)
BASOPHILS ABSOLUTE: 0 K/UL (ref 0–0.2)
BASOPHILS RELATIVE PERCENT: 0.2 %
BUN BLDV-MCNC: 4 MG/DL (ref 7–20)
CALCIUM SERPL-MCNC: 8.3 MG/DL (ref 8.3–10.6)
CHLORIDE BLD-SCNC: 101 MMOL/L (ref 99–110)
CO2: 24 MMOL/L (ref 21–32)
CREAT SERPL-MCNC: 0.8 MG/DL (ref 0.8–1.3)
EOSINOPHILS ABSOLUTE: 0 K/UL (ref 0–0.6)
EOSINOPHILS RELATIVE PERCENT: 0.1 %
GFR AFRICAN AMERICAN: >60
GFR NON-AFRICAN AMERICAN: >60
GLUCOSE BLD-MCNC: 167 MG/DL (ref 70–99)
HCT VFR BLD CALC: 24.6 % (ref 40.5–52.5)
HEMOGLOBIN: 8.2 G/DL (ref 13.5–17.5)
LYMPHOCYTES ABSOLUTE: 0.3 K/UL (ref 1–5.1)
LYMPHOCYTES RELATIVE PERCENT: 7.5 %
MAGNESIUM: 1.4 MG/DL (ref 1.8–2.4)
MCH RBC QN AUTO: 32.1 PG (ref 26–34)
MCHC RBC AUTO-ENTMCNC: 33.2 G/DL (ref 31–36)
MCV RBC AUTO: 96.8 FL (ref 80–100)
MONOCYTES ABSOLUTE: 0.1 K/UL (ref 0–1.3)
MONOCYTES RELATIVE PERCENT: 1.3 %
NEUTROPHILS ABSOLUTE: 3.4 K/UL (ref 1.7–7.7)
NEUTROPHILS RELATIVE PERCENT: 90.9 %
PDW BLD-RTO: 23.1 % (ref 12.4–15.4)
PLATELET # BLD: 93 K/UL (ref 135–450)
PLATELET SLIDE REVIEW: ABNORMAL
PMV BLD AUTO: 7.5 FL (ref 5–10.5)
POLYCHROMASIA: ABNORMAL
POTASSIUM SERPL-SCNC: 2.3 MMOL/L (ref 3.5–5.1)
RBC # BLD: 2.54 M/UL (ref 4.2–5.9)
SLIDE REVIEW: ABNORMAL
SODIUM BLD-SCNC: 137 MMOL/L (ref 136–145)
TEAR DROP CELLS: ABNORMAL
WBC # BLD: 3.7 K/UL (ref 4–11)

## 2021-10-11 PROCEDURE — 2580000003 HC RX 258: Performed by: INTERNAL MEDICINE

## 2021-10-11 PROCEDURE — 96376 TX/PRO/DX INJ SAME DRUG ADON: CPT

## 2021-10-11 PROCEDURE — G0378 HOSPITAL OBSERVATION PER HR: HCPCS

## 2021-10-11 PROCEDURE — 6360000002 HC RX W HCPCS: Performed by: EMERGENCY MEDICINE

## 2021-10-11 PROCEDURE — 83735 ASSAY OF MAGNESIUM: CPT

## 2021-10-11 PROCEDURE — 85025 COMPLETE CBC W/AUTO DIFF WBC: CPT

## 2021-10-11 PROCEDURE — 96365 THER/PROPH/DIAG IV INF INIT: CPT

## 2021-10-11 PROCEDURE — 96367 TX/PROPH/DG ADDL SEQ IV INF: CPT

## 2021-10-11 PROCEDURE — 6370000000 HC RX 637 (ALT 250 FOR IP): Performed by: EMERGENCY MEDICINE

## 2021-10-11 PROCEDURE — 93005 ELECTROCARDIOGRAM TRACING: CPT | Performed by: EMERGENCY MEDICINE

## 2021-10-11 PROCEDURE — 96374 THER/PROPH/DIAG INJ IV PUSH: CPT

## 2021-10-11 PROCEDURE — 80048 BASIC METABOLIC PNL TOTAL CA: CPT

## 2021-10-11 PROCEDURE — 36415 COLL VENOUS BLD VENIPUNCTURE: CPT

## 2021-10-11 PROCEDURE — 99283 EMERGENCY DEPT VISIT LOW MDM: CPT

## 2021-10-11 RX ORDER — SODIUM CHLORIDE, SODIUM LACTATE, POTASSIUM CHLORIDE, CALCIUM CHLORIDE 600; 310; 30; 20 MG/100ML; MG/100ML; MG/100ML; MG/100ML
INJECTION, SOLUTION INTRAVENOUS CONTINUOUS
Status: DISCONTINUED | OUTPATIENT
Start: 2021-10-11 | End: 2021-10-13

## 2021-10-11 RX ORDER — MAGNESIUM SULFATE IN WATER 40 MG/ML
2000 INJECTION, SOLUTION INTRAVENOUS PRN
Status: DISCONTINUED | OUTPATIENT
Start: 2021-10-11 | End: 2021-10-14 | Stop reason: HOSPADM

## 2021-10-11 RX ORDER — POTASSIUM CHLORIDE 7.45 MG/ML
10 INJECTION INTRAVENOUS PRN
Status: DISCONTINUED | OUTPATIENT
Start: 2021-10-11 | End: 2021-10-14 | Stop reason: HOSPADM

## 2021-10-11 RX ORDER — ACETAMINOPHEN 325 MG/1
650 TABLET ORAL EVERY 6 HOURS PRN
Status: DISCONTINUED | OUTPATIENT
Start: 2021-10-11 | End: 2021-10-14 | Stop reason: HOSPADM

## 2021-10-11 RX ORDER — SODIUM CHLORIDE 0.9 % (FLUSH) 0.9 %
5-40 SYRINGE (ML) INJECTION EVERY 12 HOURS SCHEDULED
Status: DISCONTINUED | OUTPATIENT
Start: 2021-10-11 | End: 2021-10-14 | Stop reason: HOSPADM

## 2021-10-11 RX ORDER — SODIUM CHLORIDE 9 MG/ML
25 INJECTION, SOLUTION INTRAVENOUS PRN
Status: DISCONTINUED | OUTPATIENT
Start: 2021-10-11 | End: 2021-10-14 | Stop reason: HOSPADM

## 2021-10-11 RX ORDER — MAGNESIUM SULFATE IN WATER 40 MG/ML
2000 INJECTION, SOLUTION INTRAVENOUS ONCE
Status: COMPLETED | OUTPATIENT
Start: 2021-10-11 | End: 2021-10-11

## 2021-10-11 RX ORDER — POTASSIUM CHLORIDE 7.45 MG/ML
10 INJECTION INTRAVENOUS
Status: COMPLETED | OUTPATIENT
Start: 2021-10-11 | End: 2021-10-11

## 2021-10-11 RX ORDER — ACETAMINOPHEN 500 MG
1000 TABLET ORAL ONCE
Status: COMPLETED | OUTPATIENT
Start: 2021-10-11 | End: 2021-10-11

## 2021-10-11 RX ORDER — SODIUM CHLORIDE 0.9 % (FLUSH) 0.9 %
5-40 SYRINGE (ML) INJECTION PRN
Status: DISCONTINUED | OUTPATIENT
Start: 2021-10-11 | End: 2021-10-14 | Stop reason: HOSPADM

## 2021-10-11 RX ORDER — ACETAMINOPHEN 650 MG/1
650 SUPPOSITORY RECTAL EVERY 6 HOURS PRN
Status: DISCONTINUED | OUTPATIENT
Start: 2021-10-11 | End: 2021-10-14 | Stop reason: HOSPADM

## 2021-10-11 RX ADMIN — ACETAMINOPHEN 1000 MG: 500 TABLET, FILM COATED ORAL at 20:52

## 2021-10-11 RX ADMIN — POTASSIUM PHOSPHATE, MONOBASIC 500 MG: 500 TABLET, SOLUBLE ORAL at 20:12

## 2021-10-11 RX ADMIN — POTASSIUM CHLORIDE 10 MEQ: 7.46 INJECTION, SOLUTION INTRAVENOUS at 22:29

## 2021-10-11 RX ADMIN — MAGNESIUM SULFATE HEPTAHYDRATE 2000 MG: 40 INJECTION, SOLUTION INTRAVENOUS at 20:15

## 2021-10-11 RX ADMIN — Medication 10 ML: at 22:36

## 2021-10-11 RX ADMIN — SODIUM CHLORIDE, POTASSIUM CHLORIDE, SODIUM LACTATE AND CALCIUM CHLORIDE: 600; 310; 30; 20 INJECTION, SOLUTION INTRAVENOUS at 22:40

## 2021-10-11 RX ADMIN — POTASSIUM CHLORIDE 10 MEQ: 7.46 INJECTION, SOLUTION INTRAVENOUS at 21:12

## 2021-10-11 RX ADMIN — POTASSIUM CHLORIDE 10 MEQ: 7.46 INJECTION, SOLUTION INTRAVENOUS at 20:02

## 2021-10-11 ASSESSMENT — PAIN SCALES - GENERAL
PAINLEVEL_OUTOF10: 1
PAINLEVEL_OUTOF10: 0

## 2021-10-11 NOTE — ED NOTES
Bed: 28  Expected date:   Expected time:   Means of arrival:   Comments:  Phani Ayala MD  10/11/21 5765

## 2021-10-11 NOTE — ED PROVIDER NOTES
MEDICATIONS:  No current facility-administered medications on file prior to encounter. Current Outpatient Medications on File Prior to Encounter   Medication Sig Dispense Refill    dicyclomine (BENTYL) 10 MG capsule Take 1 capsule by mouth 3 times daily as needed (cramping) 30 capsule 3    diphenoxylate-atropine (LOMOTIL) 2.5-0.025 MG per tablet Take 2.5 mg by mouth.  Magic Mouthwash (MIRACLE MOUTHWASH) Swish and spit 5 mLs 4 times daily as needed for Irritation      lidocaine-prilocaine (EMLA) 2.5-2.5 % cream Apply a thick layer over the port 30 minutes prior to treatment and cover with saran wrap. 1 Tube 5     ALLERGIES  Potassium-containing compounds  FAMILY HISTORY  Family History   Problem Relation Age of Onset    Cancer Father         Skin (melanoma)    Mult Sclerosis Mother     No Known Problems Brother      SOCIAL HISTORY:  Social History     Tobacco Use    Smoking status: Never Smoker    Smokeless tobacco: Never Used   Substance Use Topics    Alcohol use: Yes     Alcohol/week: 2.0 standard drinks     Types: 2 Cans of beer per week     Comment: daily     Drug use: No     IMMUNIZATIONS:  Noncontributory    PHYSICAL EXAM  VITAL SIGNS:  Blood pressure 127/63, pulse 89, temperature 98 °F (36.7 °C), resp. rate 16, weight 169 lb (76.7 kg), SpO2 100 %. Constitutional:  57 y.o. male alert, cooperative, nontoxic  HENT:  Atraumatic, mucous membranes moist  Eyes:   Conjunctiva clear, no icterus  Neck:  Supple, no JVD, no signs of injury  Cardiovascular:  Regular, no rubs  Thorax & Lungs:  No accessory muscle usage, clear  Abdomen:  Soft, non distended, bowel sounds present, NT  Back:  No deformity  Genitalia:  Deferred  Rectal:  Deferred  Extremities:  No cyanosis, mild edema  Skin:  Warm, dry  Neurologic:  Alert, no slurred speech, no focal deficits noted  Psychiatric:  Affect appropriate    DIAGNOSTIC RESULTS:  Labs resulted at the time of this note reviewed.   Labs Reviewed   CBC WITH AUTO DIFFERENTIAL - Abnormal; Notable for the following components:       Result Value    WBC 3.7 (*)     RBC 2.54 (*)     Hemoglobin 8.2 (*)     Hematocrit 24.6 (*)     RDW 23.1 (*)     Platelets 93 (*)     Lymphocytes Absolute 0.3 (*)     Polychromasia Occasional (*)     Tear Drop Cells Occasional (*)     All other components within normal limits    Narrative:     Performed at:  [facility] [address] [facility], [facility]   Phone [phone]   BASIC METABOLIC PANEL - Abnormal; Notable for the following components:    Potassium 2.3 (*)     Glucose 167 (*)     BUN 4 (*)     All other components within normal limits    Narrative:     Laura Ian  [facility] tel. [phone],  Chemistry results called to and read back by URMILA Carranza, 10/11/2021  16:34, by Roberta Eden  Performed at:  [facility] [address], [place], [facility]   Phone [phone]   MAGNESIUM - Abnormal; Notable for the following components:    Magnesium 1.40 (*)     All other components within normal limits    Narrative:     Laura Celisdi  [facility] tel. [phone],  Chemistry results called to and read back by URMILA Carranza, 10/11/2021  16:34, by Roberta Eden  Performed at:  [facility] [address], [place], [address]   Phone [phone]     EKG:  Read by me in the absence of a cardiologist shows: Sinus rhythm, rate 74, QTC is 495, other intervals normal, axis normal, no acute injury pattern    RADIOLOGY:  None     ED COURSE:  Medications administered:  Medications   potassium chloride 10 mEq/100 mL IVPB (Peripheral Line) (has no administration in time range)   potassium phosphate (monobasic) (K-PHOS) tablet 500 mg (has no administration in time range)   magnesium sulfate 2000 mg in 50 mL IVPB premix (has no administration in time range)     PROCEDURES:  None    CRITICAL CARE:  Total critical care time of 31 minutes (excludes any time for procedures). This includes but not limited to vital sign monitoring, medication, clinical response to medications, interpretation of diagnostics, review of nursing notes, pertinent record review, discussions about patient condition, consultation time, documentation time. Critical care due to the patient's electrolyte abnormalities    CONSULTATIONS:  Hospitalist, Dr Letha May: Jez Garrison is a 57 y.o. male who presented with concern for hypokalemia. He has low levels despite oral supplement at home. We have initiated replacement. I did consult with Oncology about admission, preference is for hospitalist admit, oncology as consultation. I discussed the case in full with the admitting physician. FINAL IMPRESSION:    1. Hypokalemia    2. Hypomagnesemia    3. Pancytopenia ([address]        (Please note that I used voice recognition software to generate this note.   Occasionally words are mistranscribed despite my efforts to edit errors.)        Geovanny Freedman MD  10/12/21 0748

## 2021-10-12 PROBLEM — E87.6 HYPOKALEMIA: Status: ACTIVE | Noted: 2021-10-12

## 2021-10-12 LAB
ANION GAP SERPL CALCULATED.3IONS-SCNC: 10 MMOL/L (ref 3–16)
BUN BLDV-MCNC: 8 MG/DL (ref 7–20)
CALCIUM SERPL-MCNC: 8 MG/DL (ref 8.3–10.6)
CHLORIDE BLD-SCNC: 103 MMOL/L (ref 99–110)
CO2: 26 MMOL/L (ref 21–32)
CREAT SERPL-MCNC: 0.7 MG/DL (ref 0.8–1.3)
EKG ATRIAL RATE: 74 BPM
EKG DIAGNOSIS: NORMAL
EKG P AXIS: 28 DEGREES
EKG P-R INTERVAL: 162 MS
EKG Q-T INTERVAL: 446 MS
EKG QRS DURATION: 100 MS
EKG QTC CALCULATION (BAZETT): 495 MS
EKG R AXIS: -2 DEGREES
EKG T AXIS: 26 DEGREES
EKG VENTRICULAR RATE: 74 BPM
GFR AFRICAN AMERICAN: >60
GFR NON-AFRICAN AMERICAN: >60
GLUCOSE BLD-MCNC: 191 MG/DL (ref 70–99)
MAGNESIUM: 2.1 MG/DL (ref 1.8–2.4)
PHOSPHORUS: 2.1 MG/DL (ref 2.5–4.9)
PHOSPHORUS: 2.8 MG/DL (ref 2.5–4.9)
POTASSIUM SERPL-SCNC: 2.6 MMOL/L (ref 3.5–5.1)
POTASSIUM SERPL-SCNC: 2.9 MMOL/L (ref 3.5–5.1)
POTASSIUM SERPL-SCNC: 3.2 MMOL/L (ref 3.5–5.1)
SODIUM BLD-SCNC: 139 MMOL/L (ref 136–145)

## 2021-10-12 PROCEDURE — 93010 ELECTROCARDIOGRAM REPORT: CPT | Performed by: INTERNAL MEDICINE

## 2021-10-12 PROCEDURE — 96372 THER/PROPH/DIAG INJ SC/IM: CPT

## 2021-10-12 PROCEDURE — 1200000000 HC SEMI PRIVATE

## 2021-10-12 PROCEDURE — 36415 COLL VENOUS BLD VENIPUNCTURE: CPT

## 2021-10-12 PROCEDURE — 96361 HYDRATE IV INFUSION ADD-ON: CPT

## 2021-10-12 PROCEDURE — 84132 ASSAY OF SERUM POTASSIUM: CPT

## 2021-10-12 PROCEDURE — 2580000003 HC RX 258: Performed by: INTERNAL MEDICINE

## 2021-10-12 PROCEDURE — 6370000000 HC RX 637 (ALT 250 FOR IP): Performed by: NURSE PRACTITIONER

## 2021-10-12 PROCEDURE — 6360000002 HC RX W HCPCS: Performed by: INTERNAL MEDICINE

## 2021-10-12 PROCEDURE — 80048 BASIC METABOLIC PNL TOTAL CA: CPT

## 2021-10-12 PROCEDURE — 84100 ASSAY OF PHOSPHORUS: CPT

## 2021-10-12 PROCEDURE — 83735 ASSAY OF MAGNESIUM: CPT

## 2021-10-12 PROCEDURE — 6370000000 HC RX 637 (ALT 250 FOR IP): Performed by: INTERNAL MEDICINE

## 2021-10-12 PROCEDURE — G0378 HOSPITAL OBSERVATION PER HR: HCPCS

## 2021-10-12 PROCEDURE — 96366 THER/PROPH/DIAG IV INF ADDON: CPT

## 2021-10-12 RX ORDER — POTASSIUM CHLORIDE 7.45 MG/ML
10 INJECTION INTRAVENOUS
Status: COMPLETED | OUTPATIENT
Start: 2021-10-12 | End: 2021-10-12

## 2021-10-12 RX ADMIN — POTASSIUM PHOSPHATE, MONOBASIC 500 MG: 500 TABLET, SOLUBLE ORAL at 11:46

## 2021-10-12 RX ADMIN — POTASSIUM CHLORIDE 10 MEQ: 7.46 INJECTION, SOLUTION INTRAVENOUS at 08:55

## 2021-10-12 RX ADMIN — POTASSIUM CHLORIDE 10 MEQ: 7.46 INJECTION, SOLUTION INTRAVENOUS at 07:24

## 2021-10-12 RX ADMIN — POTASSIUM CHLORIDE 10 MEQ: 7.46 INJECTION, SOLUTION INTRAVENOUS at 10:17

## 2021-10-12 RX ADMIN — ENOXAPARIN SODIUM 40 MG: 40 INJECTION SUBCUTANEOUS at 09:00

## 2021-10-12 RX ADMIN — POTASSIUM CHLORIDE 10 MEQ: 7.46 INJECTION, SOLUTION INTRAVENOUS at 21:57

## 2021-10-12 RX ADMIN — SODIUM CHLORIDE, POTASSIUM CHLORIDE, SODIUM LACTATE AND CALCIUM CHLORIDE: 600; 310; 30; 20 INJECTION, SOLUTION INTRAVENOUS at 08:54

## 2021-10-12 RX ADMIN — POTASSIUM CHLORIDE 10 MEQ: 7.46 INJECTION, SOLUTION INTRAVENOUS at 20:10

## 2021-10-12 RX ADMIN — POTASSIUM CHLORIDE 10 MEQ: 7.46 INJECTION, SOLUTION INTRAVENOUS at 12:30

## 2021-10-12 RX ADMIN — POTASSIUM PHOSPHATE, MONOBASIC 500 MG: 500 TABLET, SOLUBLE ORAL at 20:11

## 2021-10-12 RX ADMIN — POTASSIUM CHLORIDE 10 MEQ: 7.46 INJECTION, SOLUTION INTRAVENOUS at 20:11

## 2021-10-12 RX ADMIN — ACETAMINOPHEN 650 MG: 325 TABLET ORAL at 20:22

## 2021-10-12 RX ADMIN — POTASSIUM PHOSPHATE, MONOBASIC 500 MG: 500 TABLET, SOLUBLE ORAL at 16:39

## 2021-10-12 RX ADMIN — POTASSIUM CHLORIDE 10 MEQ: 7.46 INJECTION, SOLUTION INTRAVENOUS at 06:28

## 2021-10-12 RX ADMIN — POTASSIUM CHLORIDE 10 MEQ: 7.46 INJECTION, SOLUTION INTRAVENOUS at 23:15

## 2021-10-12 RX ADMIN — POTASSIUM CHLORIDE 10 MEQ: 7.46 INJECTION, SOLUTION INTRAVENOUS at 11:44

## 2021-10-12 RX ADMIN — SODIUM CHLORIDE, POTASSIUM CHLORIDE, SODIUM LACTATE AND CALCIUM CHLORIDE: 600; 310; 30; 20 INJECTION, SOLUTION INTRAVENOUS at 20:08

## 2021-10-12 ASSESSMENT — PAIN SCALES - GENERAL
PAINLEVEL_OUTOF10: 0
PAINLEVEL_OUTOF10: 0
PAINLEVEL_OUTOF10: 4
PAINLEVEL_OUTOF10: 0
PAINLEVEL_OUTOF10: 10
PAINLEVEL_OUTOF10: 0

## 2021-10-12 ASSESSMENT — PAIN DESCRIPTION - PAIN TYPE: TYPE: ACUTE PAIN

## 2021-10-12 ASSESSMENT — PAIN DESCRIPTION - LOCATION: LOCATION: HEAD

## 2021-10-12 NOTE — PLAN OF CARE
8/21/2019    Chief Complaint   Patient presents with   • Sore Throat     patient has had a sore throat for 3 day with headache and cough       Problem List Items Addressed This Visit     None      Visit Diagnoses     Acute pharyngitis, unspecified etiology    -  Primary    Relevant Orders    POCT RAPID STREP A (Completed)          HPI  Pt c/o sore throat. Hurts with swallowing. Headache. Neck soreness. Afebrile. Started 3 days ago. Yesterday worse.   OTC: ibuprofen  Recent antibiotic use: No  Recent foreign travel: No  Household exposure to illness: No    Current Outpatient Medications   Medication Sig Dispense Refill   • LITHIUM CARBONATE PO        No current facility-administered medications for this visit.        ALLERGIES:  No Known Allergies    Review of Systems   Constitutional: Negative for fever.   HENT: Positive for sore throat. Negative for congestion, ear discharge, ear pain and rhinorrhea.    Respiratory: Positive for cough.    Musculoskeletal: Positive for neck pain (soreness).   Skin: Negative for rash.   Neurological: Positive for headaches.       No LMP for male patient.    Past Medical History:   Diagnosis Date   • Bipolar 1 disorder (CMS/HCC)    • No known problems        Past Surgical History:   Procedure Laterality Date   • No past surgeries         No family history on file.    Social History     Tobacco Use   • Smoking status: Never Smoker   Substance Use Topics   • Alcohol use: Never     Frequency: Never       Examination:   /72   Pulse 74   Temp 98.5 °F (36.9 °C) (Oral)   Resp 16       Physical Exam   Constitutional: He appears well-developed and well-nourished. No distress.   HENT:   Right Ear: Hearing, tympanic membrane, external ear and ear canal normal.   Left Ear: Hearing, tympanic membrane, external ear and ear canal normal.   Nose: Nose normal.   Mouth/Throat: Uvula is midline. Posterior oropharyngeal erythema present.   Tonsils 1+    Cardiovascular: Normal rate, regular  Problem: Fluid Volume:  Goal: Ability to achieve a balanced intake and output will improve  Description: Ability to achieve a balanced intake and output will improve  Outcome: Ongoing     Problem: Physical Regulation:  Goal: Ability to maintain clinical measurements within normal limits will improve  Description: Ability to maintain clinical measurements within normal limits will improve  Outcome: Ongoing  Goal: Will show no signs and symptoms of electrolyte imbalance  Description: Will show no signs and symptoms of electrolyte imbalance  Outcome: Ongoing rhythm and normal heart sounds.   Pulmonary/Chest: Effort normal and breath sounds normal. No respiratory distress. He has no wheezes. He has no rales.   Lymphadenopathy:     He has cervical adenopathy.        Right cervical: No posterior cervical adenopathy present.       Left cervical: No posterior cervical adenopathy present.   Skin: Skin is warm and dry.   Nursing note and vitals reviewed.        Assessment/Plan:  Diagnoses and all orders for this visit:  Acute pharyngitis, unspecified etiology  -     POCT RAPID STREP A      Results for orders placed or performed in visit on 08/21/19   POCT RAPID STREP A   Result Value    GRP A STREP Negative    Internal Procedural Controls Acceptable Yes               Susan Martinze CNP        08/21/19    12:05 PM

## 2021-10-12 NOTE — PROGRESS NOTES
[facility]   Consult Note      Reason for Consult: metastatic colon cancer   Requesting Physician:  nAy Mcclellan MD    CHIEF COMPLAINT:  Patient was admitted due to severe hypokalemia     History Obtained From: patient    HISTORY OF PRESENT ILLNESS:   Mr Sydnee Fitzgerald was advised to report to the emergency room after he was found to have profound hypokalemia He has complicated history of metastatic colon cancer. This was summarized by Dr Nuris Hernandes at his most recent visit;    1. Ascending colon adenocarcinoma: CEA is 4.6. Diagnosed in May 2017 after getting evaluated for iron deficiency anemia. Subsequent workup included CT scan of the chest abdomen and pelvis which showed liver metastasis and mesenteric lymphadenopathy. PET scan showed hypermetabolic lesions in the liver - 2, Intra-abdominal hypermetabolic lymphadenopathy as well as a posterior mediastinal hypermetabolic lymph node. Biopsy of the metastatic lesions deemed not necessary. KRAS Mutation +    2. Started FOLFOX and Avastin. 3. Repeated imaging studies after 4 cycles-shows improvement. Extensive surgery would still be a consideration if he has good response    CT scans done after 8 cycles of systemic therapy did not show much improvement in the metastatic lesions. CT Chest showed some improvement in mediastinal LAD. Liver biopsy on 10/5/17 showed no evidence of malignancy. 4. Was evaluated by a team of surgeons-colorectal, liver and thoracic surgeons at [facility]. in order to do extensive surgery and resection of the metastatic disease. 5. Competed 12 cycles on 11/27-29/17. Cycle # 12 was done without Avastin. 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 6. PET scan done on 12/13/17 showed Very good response - no hypermetabolic lesions in the liver, mediastinal lymph node was considerably smaller. Cecal lesion was smaller.  Intense hypermetabolic lesion immediately anterior to the cecal mass, deep to the abdominal wall was noted. This was thought to be new. In my opinion this might be artifact. 7. Started on maintenance chemotherapy with 5-FU, leucovorin and infusional 5-FU in 2018 awaiting surgery     8. He underwent right-sided colectomy as well as resection of the liver lesions on 3/19/2018. Pathology from this showed metastatic adenocarcinoma in the liver. There was more metastatic disease in the liver per Dr. Betty Avalos which could not be resected    9. CT chest abdomen and pelvis done subsequently on 2018 showed progression of the right lower lobe lung mass. Dr. Klein Perish -thoracic surgeon had already decided to proceed with wedge resection which was done on 2018. Pathology confirmed that it was metastatic colon cancer. 10.  Started FOLFIRI and Avastin in early May 2018. 11. MRI of the abdomen and pelvis was ordered by Dr. Betty Avalos done on 2018 showed 2 oval areas of nonenhancing lesions in the liver parenchyma and other lesions were stable. Previous Therapies    As above            Current Therapy  Fluorouracil (Bolus + CIV) D1-2 + Leucovorin + Irinotecan (FOLFIRI) + Bevacizumab BIOSIMILAR Q14D Cycle Length: 14 Number Cycles: 12 Start: Stephie Ohara on 2021 Assoc Dx: Malignant tumor of ascending colon (disorder) LOT: 2nd Line Metastatic Stage: IVC 2021 C10 D1  Irinotecan IV, 360 mg (180 mg/m2)  Leucovorin IV, 792 mg (400 mg/m2)  Fluorouracil IV, 790 mg (400 mg/m2)  Fluorouracil IV, 4750 mg (2400 mg/m2)  Mvasi (Bevacizumab-awwb IV), 390 mg (5 mg/kg)  Atropine IV, 0.4 mg  OHC Iron Sucrose (Venofer) D1,8,15 (Intravenous Iron) Cycle Length: 15 Number Cycles: 1 Start: C1D1 on 2021 Assoc Dx: Malignant tumor of ascending colon (disorder) LOT: Other 2021 C1 D1  Iron Sucrose IV,  mg  Iron Sucrose IV,  mg  Filgrastim Cycle Length: 7 Number Cycles: 1 Start: Stephie Olivierty on 2021 Assoc Dx:  Malignant tumor of ascending colon (disorder) LOT: Toxicity Management 2021 C1 D1  Zarxio (Filgrastim-Sndz Subcutaneous),  mcg  Pegfilgrastim Cycle Length: 14 Number Cycles: 11 Start: Vince Reyes on 2021 Assoc Dx: Agranulocytosis due to cancer chemotherapy LOT: Toxicity Management 2021 C1 D1  Pegfilgrastim Subcutaneous, 6 mg  OHC Hydration Cycle Length: 1 Number Cycles: 1 Start: Vince Reyes on 10/06/2021 Assoc Dx: Malignant tumor of ascending colon (disorder) LOT: Toxicity Management 10/06/2021 C1 D1  Sodium Chloride IV 0.9 %, .9 % 1000 mL, Dose modified  OHC Hydration Cycle Length: 1 Number Cycles: 1 Start: Vince Reyes on 10/06/2021 Assoc Dx: Malignant tumor of ascending colon (disorder) LOT: Toxicity Management 10/06/2021 C1 D1  Potassium Chloride IV,  mEq, Dose modified         Past Medical History:     has a past medical history of Anemia, Colon cancer ([address] Hypertension, and Lung nodule.    Past Surgical History:    Past Surgical History:   Procedure Laterality Date    COLONOSCOPY      CYST REMOVAL  1973    Behind right knee    LIVER BIOPSY Right 10/05/2017    phalen MD at [facility] in specials as outpt    OTHER SURGICAL HISTORY  2018     Robotic assisted converted to open right colectomy with anastomosis, greater omental vascularized pedicled flap creation    THORACOSCOPY Right 2018    RIGHT VIDEO ASSISTED THORACOSCOPY WITH WEDGE EXCISION RIGHT    TUNNELED VENOUS PORT PLACEMENT Left 2017    UPPER GASTROINTESTINAL ENDOSCOPY      WISDOM TOOTH EXTRACTION        Current Medications:    Current Facility-Administered Medications   Medication Dose Route Frequency Provider Last Rate Last Admin    potassium chloride 10 mEq/100 mL IVPB (Peripheral Line)  10 mEq IntraVENous Q1H Isma Rogers  mL/hr at 10/12/21 0724 10 mEq at 10/12/21 0724    sodium chloride flush 0.9 % injection 5-40 mL  5-40 mL IntraVENous 2 times per day Isma Rogers MD   10 mL at 10/11/21 2236    sodium chloride flush 0.9 % injection 5-40 mL  5-40 mL IntraVENous PRN Isma Rogers MD        0.9 % sodium chloride infusion  25 mL IntraVENous PRN Isma Rogers MD        enoxaparin (LOVENOX) injection 40 mg  40 mg SubCUTAneous Daily Isma Rogers MD        acetaminophen (TYLENOL) tablet 650 mg  650 mg Oral Q6H PRN Isma Rogers MD        Or    acetaminophen (TYLENOL) suppository 650 mg  650 mg Rectal Q6H PRN Isma Rogers MD        lactated ringers infusion   IntraVENous Continuous Louise Italia Rogers  mL/hr at 10/12/21 0723 Rate Verify at 10/12/21 0723    magnesium sulfate 2000 mg in 50 mL IVPB premix  2,000 mg IntraVENous PRN Isma Rogers MD        potassium chloride 10 mEq/100 mL IVPB (Peripheral Line)  10 mEq IntraVENous PRN Isma Cavanaugh MD         Allergies: Allergies   Allergen Reactions    Potassium-Containing Compounds Rash      Social History:    reports that he has never smoked. He has never used smokeless tobacco. He reports current alcohol use of about 2.0 standard drinks of alcohol per week. He reports that he does not use drugs. Family History:     family history includes Cancer in his father; Mult Sclerosis in his mother; No Known Problems in his brother. REVIEW OF SYSTEMS:      · Constitutional: Denies fever, chills, sweats, weight loss. Denies pain. · Eyes: No visual changes or diplopia. No scleral icterus. · ENT: No Headaches, hearing loss or vertigo. No mouth sores or sore throat. · Cardiovascular: No chest pain, dyspnea on exertion, palpitations or loss of consciousness. · Respiratory: No cough or wheezing, no sputum production. No hemoptysis. · Gastrointestinal: No abdominal pain, appetite loss, blood in stools. No change in bowel habits. Has carcinoma right colon metastatic to liver and lung   · Genitourinary: No dysuria, trouble voiding, or hematuria. · Musculoskeletal:   No generalized weakness. No joint complaints.   · Integumentary: No rash or pruritis. · Neurological: No headache, diplopia. No change in gait, balance, or coordination. No focal neurological deficits including weakness, numbness, or tingling. · Endocrine: No temperature intolerance. No excessive thirst, fluid intake, or urination. · Hematologic/Lymphatic: No abnormal bruising or ecchymoses, blood clots or swollen lymph nodes. · Allergic/Immunologic: No nasal congestion or hives. ·     PHYSICAL EXAM:    Vitals:  Vitals:    10/12/21 0440   BP: 112/70   Pulse: 77   Resp: 18   Temp: 97.6 °F (36.4 °C)   SpO2: 100%      CONSTITUTIONAL:  awake, alert, cooperative, no apparent distress  EYES:  pupils equal, round and reactive to light, sclera clear and conjunctiva normal  ENT:  normocepalic, without obvious abnormality, atramatic  NECK:  supple, symmetrical, no jugular venous distension and no carotid bruits  HEMATOLOGIC/LYMPHATICS:  No cervical,supraclavicular or axillary lymphadenopathy  LUNGS:  No increased work of breathing and clear to auscultation  CARDIOVASCULAR: Regular rate and rhythm, normal S1 and S2, no murmur noted  ABDOMEN:  Normal bowel sounds x 4, soft, non-distended, non-tender, no masses palpated, no hepatosplenomegally  MUSCULOSKELETAL:  full range of motion noted, tone is normal  EXTREMITIES: no LE edema  NEUROLOGIC:  Awake, alert, oriented to name, place and time. Motor skills grossly intact. SKIN:  normal skin color, texture, turgor and no jaundice. Appears intact. DATA:  General Labs:    CBC:   Recent Labs     10/11/21  1610   WBC 3.7*   HGB 8.2*   HCT 24.6*   MCV 96.8   PLT 93*     BMP:   Recent Labs     10/11/21  1610 10/12/21  0443    139   K 2.3* 2.6*    103   CO2 24 26   BUN 4* 8   CREATININE 0.8 0.7*     LIVER PROFILE: No results for input(s): AST, ALT, LIPASE, BILIDIR, BILITOT, ALKPHOS in the last 72 hours. Invalid input(s):   AMYLASE,  ALB  PT/INR:    Lab Results   Component Value Date    PROTIME 12.3 07/02/2020    PROTIME 11.1 04/18/2018 represent peritoneal implants on the surface of the liver. The largest measures 1.5 x 1.3 cm and is unchanged (series 3 image 83). Biliary Tract / Gallbladder: No intra or extrahepatic biliary dilation. No gallstones. No gallbladder wall thickening or pericholecystic fluid. Pancreas: Normal. Spleen: Spleen is mildly prominent but unchanged from prior study. Adrenals: Normal. Kidneys and ureters: Kidneys are normal in volume and without hydronephrosis. Lymph nodes: None enlarged. Stomach and bowel: Partial right colectomy change noted. No bowel obstruction. No abnormal bowel wall thickening. Mesentery/peritoneum: Trace free fluid in the dependent pelvis. The amount of free fluid has significantly improved since the prior study. No free air. Vessels/retroperitoneum: Normal. Body wall: No abnormality. Urinary bladder: Stable mild thickening of the urinary bladder wall. Reproductive organs: Unremarkable. Musculoskeletal: No destructive osseous abnormality. Impression: 1. Mass in the medial segment of the right lower lobe has decreased in size since the prior study, currently measuring 4.2 x 3.1 cm, previously 6.1 x 5.0 cm. 2. No new disease in the chest. Presence of a few stable 4 mm pulmonary nodules. 3. Mass in the left lobe of the liver has decreased in size, currently measuring 3.6 x 2.8 cm, previously 4.0 x 2.8 cm. 4. The questionable peritoneal implants along the superior surface of the liver are unchanged. No evidence of new disease in the abdomen or pelvis. 5. Resolution of previously identified enlarged left external iliac lymph node. Decreased prominence of small lymph nodes in the olegario hepatis and retroperitoneum. 6. Volume of ascites has significantly decreased since the prior study. 7. Stable mild splenomegaly.           Assessment & Plan:  Severe hypokalemia and low magnesium needs iv replacement therapy    Carcinoma colon extensively treated with surgery and chemotherapy continue treatment as directed by dr Terese Larkin    Patient currently on infusion 5fu by pump will complete this cycle chemotherapy    Anemia and thrombocytopenia due to chemotherapy          I have discussed the above stated plan with the patient and they verbalized understanding and agreed with the plan. Thank you for allowing us to participate in this patients care.     Kareen Soto MD  10/12/2021, 7:39 AM

## 2021-10-12 NOTE — PROGRESS NOTES
Pt to room orders released and acknowledged, home meds reviewed, awaiting chemo precaution cart to finish his admission for neutropenic precautions as pt is actively getting chemo via port with pump he wears around waste.

## 2021-10-12 NOTE — PROGRESS NOTES
H/p dictation id [id number]. Date of service 10/11/2021. Hypokalemia. Hypomagnesemia. Pancytopenia. Metastatic colorectal cancer.

## 2021-10-12 NOTE — H&P
[facility]                     [facility], [address]                              HISTORY AND PHYSICAL    PATIENT NAME: Irma Young                     :        1960  MED REC NO:   [id number]                          ROOM:       9519  ACCOUNT NO:   [de-identified]                           ADMIT DATE: 10/11/2021  PROVIDER:     Nathen Ryan MD    DATE OF SERVICE:  I examined the patient on 10/11/2021. CHIEF COMPLAINT:  \"My potassium was low. \"    HISTORY OF PRESENT ILLNESS:  A 78-year-old  male who is  currently undergoing treatment for metastatic colorectal cancer, had his  labs drawn at the chemotherapy center in the morning and was called by  the chemotherapy center and he was told to come to the hospital because  the potassium level was 1.9. The patient did not have any complaints  whatsoever. PAST MEDICAL HISTORY:  1. Colon cancer metastatic to the lung and liver. 2.  Hypertension. PAST SURGICAL HISTORY:  Colectomy. The patient also had a liver met  resection and pulmonary wedge resection. ALLERGIC HISTORY:  No major drug allergies. FAMILY HISTORY:  Reviewed by me and is currently noncontributory. SOCIAL HISTORY:  Lives at home. No illicit substance use. MEDICATIONS:  Home medication list reviewed and documented in the EMR. REVIEW OF SYSTEMS:  Review of systems is significant for the  hypokalemia. The patient does not have any compliance at the time of my  examination _____. All other systems reviewed and are negative except  for the history of present illness. PHYSICAL EXAMINATION:  VITAL SIGNS:  Temperature 98, respiratory rate 16, pulse 89, blood  pressure 127/63, saturating 100%. CNS:  Alert and oriented. PSYCH:  The patient is cooperative, answering questions appropriately. EYES:  Pupils are reactive to light. Extraocular muscle movements are  intact.   RESPIRATORY SYSTEM:  No obvious rales or rhonchi. CVS:  S1, S2 are heard. No murmurs or rubs. ABDOMEN:  Nondistended. MUSCULOSKELETAL:  No acute deformities. SKIN:  No obvious rashes or lesions noted. The patient does have pallor  in the skin. DIAGNOSTIC DATA:  White count 13.7, hemoglobin 8.2, hematocrit 24.6,  platelets of 93. Magnesium 1.4. Basic metabolic panel showed potassium  2.3, sodium 137, chloride 101, carbon dioxide 24, anion gap 12, glucose  167, BUN 4, creatinine 0.8. EKG independently reviewed by me shows  normal sinus rhythm with a QT interval which is slightly prolonged _____  rate of 74 beats per minute. CONSULTATIONS REQUESTED:  Currently Oncology. ASSESSMENT:  1. Acute hypokalemia. 2.  Acute hypomagnesemia. 3.  Pancytopenia secondary to chemotherapy. 4.  Metastatic colorectal cancer. PLAN OF CARE:  The patient is admitted to Internal Medicine Service to  observation. Potassium and magnesium replacements have been ordered and  will be continued. Telemetry monitoring will be continued. DVT  prophylaxis with Lovenox. CODE STATUS:  Full. EXPECTED LENGTH OF STAY:  Less than two midnights based on the plan of  care above. RISK:  High due to the patient's presentation with significant  hypokalemia and hypomagnesemia. DISPOSITION:  Observation telemetry.         Awilda Jose MD    D: 10/12/2021 5:32:44       T: 10/12/2021 6:33:11     [email or web address]  Job#: [id number]     Doc#: [id number]    CC:

## 2021-10-12 NOTE — PROGRESS NOTES
[facility]    10/12/2021 7:57 AM        Name: Luis Wilson . Admitted: 10/11/2021  Primary Care Provider: Rachell Goetz MD (Tel: [phone])      Subjective: Tiffanie Penny Pt seen this am   [facility]  Currently receiving IV and oral K supplements  Has oral lesions from his current chemo which is infusing. Chemo has caused loose stools. He offers no current reports of pain  Not eating much due to oral lesions      Reviewed interval ancillary notes    Current Medications  potassium chloride 10 mEq/100 mL IVPB (Peripheral Line), Q1H  sodium chloride flush 0.9 % injection 5-40 mL, 2 times per day  sodium chloride flush 0.9 % injection 5-40 mL, PRN  0.9 % sodium chloride infusion, PRN  enoxaparin (LOVENOX) injection 40 mg, Daily  acetaminophen (TYLENOL) tablet 650 mg, Q6H PRN   Or  acetaminophen (TYLENOL) suppository 650 mg, Q6H PRN  lactated ringers infusion, Continuous  magnesium sulfate 2000 mg in 50 mL IVPB premix, PRN  potassium chloride 10 mEq/100 mL IVPB (Peripheral Line), PRN        Objective:  /70   Pulse 77   Temp 97.6 °F (36.4 °C) (Oral)   Resp 18   Ht 5' 11\" (1.803 m)   Wt 167 lb (75.8 kg)   SpO2 100%   BMI 23.29 kg/m²     Intake/Output Summary (Last 24 hours) at 10/12/2021 0757  Last data filed at 10/12/2021 2441  Gross per 24 hour   Intake 1279.59 ml   Output --   Net 1279.59 ml      Wt Readings from Last 3 Encounters:   10/11/21 167 lb (75.8 kg)   04/08/21 180 lb (81.6 kg)   03/10/21 181 lb 12.8 oz (82.5 kg)       General appearance:  Appears comfortable, but looks chronically ill and frail   Eyes: Sclera clear. Pupils equal.  ENT: Moist oral mucosa. Trachea midline, no adenopathy. Oral lesions noted on the side of his tongue  Cardiovascular: Regular rhythm, normal S1, S2. No murmur.  No edema in lower extremities  Respiratory: Not using accessory muscles. Good inspiratory effort. Clear to auscultation bilaterally, no wheeze or crackles. GI: Abdomen soft with bowel sounds present , no current guarding or pain   Musculoskeletal: No cyanosis in digits, neck supple  Neurology: CN 2-12 grossly intact. No speech or motor deficits  Psych: Normal affect. Alert and oriented in time, place and person  Skin: Warm, dry, normal turgor    Labs and Tests:  CBC:   Recent Labs     10/11/21  1610   WBC 3.7*   HGB 8.2*   PLT 93*     BMP:    Recent Labs     10/11/21  1610 10/12/21  0443 10/12/21  0747    139  --    K 2.3* 2.6* 2.9*    103  --    CO2 24 26  --    BUN 4* 8  --    CREATININE 0.8 0.7*  --    GLUCOSE 167* 191*  --      Hepatic: No results for input(s): AST, ALT, ALB, BILITOT, ALKPHOS in the last 72 hours. Problem List  Active Problems:    Acute hypokalemia  Resolved Problems:    * No resolved hospital problems. *       Assessment & Plan:   1. Hypokalemia:  Continue with aggressive IV and oral replacement therapy as tolerated. He does have allergy to previous K supplement ( ? K dur? And is hesitant to try new meds )  Will continue with K phos   2. Low magnesium: improved with replacement therapy   3. Colon Cancer with mets: follows with Dr Lazaro Puckett,  On chemo. PET scan shows response to therapy . Oncology notes reviewed   4. Neutropenia:  From chemo , in precautions. He is not febrile , will follow   5. No needs anticipated at d/c       Diet: ADULT DIET;  Regular  Code:Full Code  DVT PPX      VIJAY Garcia - CNP   10/12/2021 7:57 AM

## 2021-10-12 NOTE — ED NOTES
Report given to 3T RN. No further questions at this time.   Tele applied by this Mel Hodgkin, RN  10/11/21 5142

## 2021-10-12 NOTE — CARE COORDINATION
Patient admitted as Observation with an anticipated short hospitalization length of stay. Chart reviewed and it appears that patient has minimal needs for discharge at this time. Discussed with patients nurse and requested that case management be notified if discharge needs are identified. *Case management will continue to follow progress and update discharge plan as needed.     Electronically signed by SOHEILA Leblanc, NICHOLE on 10/12/2021 at 1:09 PM

## 2021-10-13 LAB
ABO/RH: NORMAL
ALBUMIN SERPL-MCNC: 2.5 G/DL (ref 3.4–5)
ANION GAP SERPL CALCULATED.3IONS-SCNC: 8 MMOL/L (ref 3–16)
ANTIBODY SCREEN: NORMAL
BASOPHILS ABSOLUTE: 0 K/UL (ref 0–0.2)
BASOPHILS RELATIVE PERCENT: 0.1 %
BLOOD BANK DISPENSE STATUS: NORMAL
BLOOD BANK PRODUCT CODE: NORMAL
BPU ID: NORMAL
BUN BLDV-MCNC: 6 MG/DL (ref 7–20)
CALCIUM SERPL-MCNC: 7.8 MG/DL (ref 8.3–10.6)
CHLORIDE BLD-SCNC: 109 MMOL/L (ref 99–110)
CO2: 25 MMOL/L (ref 21–32)
CREAT SERPL-MCNC: 0.5 MG/DL (ref 0.8–1.3)
DESCRIPTION BLOOD BANK: NORMAL
EOSINOPHILS ABSOLUTE: 0 K/UL (ref 0–0.6)
EOSINOPHILS RELATIVE PERCENT: 0.1 %
GFR AFRICAN AMERICAN: >60
GFR NON-AFRICAN AMERICAN: >60
GLUCOSE BLD-MCNC: 102 MG/DL (ref 70–99)
HCT VFR BLD CALC: 19.8 % (ref 40.5–52.5)
HCT VFR BLD CALC: 22 % (ref 40.5–52.5)
HEMOGLOBIN: 6.6 G/DL (ref 13.5–17.5)
HEMOGLOBIN: 7.3 G/DL (ref 13.5–17.5)
LYMPHOCYTES ABSOLUTE: 0.4 K/UL (ref 1–5.1)
LYMPHOCYTES RELATIVE PERCENT: 23.1 %
MAGNESIUM: 1.8 MG/DL (ref 1.8–2.4)
MAGNESIUM: 1.8 MG/DL (ref 1.8–2.4)
MAGNESIUM: 1.9 MG/DL (ref 1.8–2.4)
MAGNESIUM: 1.9 MG/DL (ref 1.8–2.4)
MCH RBC QN AUTO: 32.6 PG (ref 26–34)
MCHC RBC AUTO-ENTMCNC: 33.6 G/DL (ref 31–36)
MCV RBC AUTO: 97.1 FL (ref 80–100)
MONOCYTES ABSOLUTE: 0.1 K/UL (ref 0–1.3)
MONOCYTES RELATIVE PERCENT: 7 %
NEUTROPHILS ABSOLUTE: 1.1 K/UL (ref 1.7–7.7)
NEUTROPHILS RELATIVE PERCENT: 69.7 %
PDW BLD-RTO: 23.1 % (ref 12.4–15.4)
PHOSPHORUS: 2.7 MG/DL (ref 2.5–4.9)
PHOSPHORUS: 2.8 MG/DL (ref 2.5–4.9)
PHOSPHORUS: 2.9 MG/DL (ref 2.5–4.9)
PHOSPHORUS: 3.2 MG/DL (ref 2.5–4.9)
PLATELET # BLD: 69 K/UL (ref 135–450)
PMV BLD AUTO: 7 FL (ref 5–10.5)
POTASSIUM SERPL-SCNC: 3.2 MMOL/L (ref 3.5–5.1)
POTASSIUM SERPL-SCNC: 3.3 MMOL/L (ref 3.5–5.1)
POTASSIUM SERPL-SCNC: 3.4 MMOL/L (ref 3.5–5.1)
POTASSIUM SERPL-SCNC: 3.6 MMOL/L (ref 3.5–5.1)
RBC # BLD: 2.03 M/UL (ref 4.2–5.9)
SODIUM BLD-SCNC: 142 MMOL/L (ref 136–145)
WBC # BLD: 1.5 K/UL (ref 4–11)

## 2021-10-13 PROCEDURE — 86850 RBC ANTIBODY SCREEN: CPT

## 2021-10-13 PROCEDURE — 2580000003 HC RX 258: Performed by: INTERNAL MEDICINE

## 2021-10-13 PROCEDURE — P9016 RBC LEUKOCYTES REDUCED: HCPCS

## 2021-10-13 PROCEDURE — 86923 COMPATIBILITY TEST ELECTRIC: CPT

## 2021-10-13 PROCEDURE — 82570 ASSAY OF URINE CREATININE: CPT

## 2021-10-13 PROCEDURE — 36415 COLL VENOUS BLD VENIPUNCTURE: CPT

## 2021-10-13 PROCEDURE — 84133 ASSAY OF URINE POTASSIUM: CPT

## 2021-10-13 PROCEDURE — 86900 BLOOD TYPING SEROLOGIC ABO: CPT

## 2021-10-13 PROCEDURE — 99222 1ST HOSP IP/OBS MODERATE 55: CPT | Performed by: INTERNAL MEDICINE

## 2021-10-13 PROCEDURE — 84132 ASSAY OF SERUM POTASSIUM: CPT

## 2021-10-13 PROCEDURE — 1200000000 HC SEMI PRIVATE

## 2021-10-13 PROCEDURE — 6370000000 HC RX 637 (ALT 250 FOR IP): Performed by: NURSE PRACTITIONER

## 2021-10-13 PROCEDURE — 6360000002 HC RX W HCPCS: Performed by: NURSE PRACTITIONER

## 2021-10-13 PROCEDURE — 85014 HEMATOCRIT: CPT

## 2021-10-13 PROCEDURE — 36591 DRAW BLOOD OFF VENOUS DEVICE: CPT

## 2021-10-13 PROCEDURE — 36430 TRANSFUSION BLD/BLD COMPNT: CPT

## 2021-10-13 PROCEDURE — 84100 ASSAY OF PHOSPHORUS: CPT

## 2021-10-13 PROCEDURE — 85025 COMPLETE CBC W/AUTO DIFF WBC: CPT

## 2021-10-13 PROCEDURE — 6360000002 HC RX W HCPCS: Performed by: INTERNAL MEDICINE

## 2021-10-13 PROCEDURE — 86901 BLOOD TYPING SEROLOGIC RH(D): CPT

## 2021-10-13 PROCEDURE — 2580000003 HC RX 258

## 2021-10-13 PROCEDURE — 85018 HEMOGLOBIN: CPT

## 2021-10-13 PROCEDURE — 83735 ASSAY OF MAGNESIUM: CPT

## 2021-10-13 PROCEDURE — 80069 RENAL FUNCTION PANEL: CPT

## 2021-10-13 RX ORDER — SODIUM CHLORIDE 9 MG/ML
INJECTION, SOLUTION INTRAVENOUS
Status: COMPLETED
Start: 2021-10-13 | End: 2021-10-13

## 2021-10-13 RX ORDER — MAGNESIUM SULFATE 1 G/100ML
1000 INJECTION INTRAVENOUS ONCE
Status: COMPLETED | OUTPATIENT
Start: 2021-10-13 | End: 2021-10-13

## 2021-10-13 RX ORDER — SODIUM CHLORIDE 9 MG/ML
INJECTION, SOLUTION INTRAVENOUS PRN
Status: DISCONTINUED | OUTPATIENT
Start: 2021-10-13 | End: 2021-10-14 | Stop reason: HOSPADM

## 2021-10-13 RX ADMIN — SODIUM CHLORIDE 25 ML: 9 INJECTION, SOLUTION INTRAVENOUS at 08:30

## 2021-10-13 RX ADMIN — Medication 10 ML: at 08:25

## 2021-10-13 RX ADMIN — Medication 10 ML: at 21:45

## 2021-10-13 RX ADMIN — POTASSIUM CHLORIDE 10 MEQ: 7.46 INJECTION, SOLUTION INTRAVENOUS at 06:53

## 2021-10-13 RX ADMIN — SODIUM CHLORIDE 250 ML: 9 INJECTION, SOLUTION INTRAVENOUS at 12:34

## 2021-10-13 RX ADMIN — POTASSIUM PHOSPHATE, MONOBASIC 1000 MG: 500 TABLET, SOLUBLE ORAL at 08:40

## 2021-10-13 RX ADMIN — POTASSIUM CHLORIDE 10 MEQ: 7.46 INJECTION, SOLUTION INTRAVENOUS at 09:53

## 2021-10-13 RX ADMIN — MAGNESIUM SULFATE HEPTAHYDRATE 1000 MG: 1 INJECTION, SOLUTION INTRAVENOUS at 15:09

## 2021-10-13 RX ADMIN — SODIUM CHLORIDE, POTASSIUM CHLORIDE, SODIUM LACTATE AND CALCIUM CHLORIDE: 600; 310; 30; 20 INJECTION, SOLUTION INTRAVENOUS at 05:51

## 2021-10-13 RX ADMIN — POTASSIUM CHLORIDE 10 MEQ: 7.46 INJECTION, SOLUTION INTRAVENOUS at 08:31

## 2021-10-13 RX ADMIN — POTASSIUM CHLORIDE 10 MEQ: 7.46 INJECTION, SOLUTION INTRAVENOUS at 05:49

## 2021-10-13 RX ADMIN — Medication 10 ML: at 15:05

## 2021-10-13 RX ADMIN — POTASSIUM PHOSPHATE, MONOBASIC 1000 MG: 500 TABLET, SOLUBLE ORAL at 16:47

## 2021-10-13 RX ADMIN — SODIUM CHLORIDE 25 ML: 9 INJECTION, SOLUTION INTRAVENOUS at 15:07

## 2021-10-13 RX ADMIN — POTASSIUM PHOSPHATE, MONOBASIC 1000 MG: 500 TABLET, SOLUBLE ORAL at 21:40

## 2021-10-13 RX ADMIN — POTASSIUM PHOSPHATE, MONOBASIC 1000 MG: 500 TABLET, SOLUBLE ORAL at 12:45

## 2021-10-13 ASSESSMENT — PAIN SCALES - GENERAL
PAINLEVEL_OUTOF10: 0

## 2021-10-13 NOTE — PROGRESS NOTES
Received call from Karley Patton NP to disconnect chemo pump. Pump was turned off and tubing disconnected from pt. Port flushed with 10 cc NS. Brisk blood return noted. Line flushed with 10 cc NS. Alcohol cap applied. Primary RN aware.  Electronically signed by Fredis Lisa RN on 10/13/2021 at 10:49 AM

## 2021-10-13 NOTE — PROGRESS NOTES
[facility]    10/13/2021 1:20 PM        Name: Mario Quiles . Admitted: 10/11/2021  Primary Care Provider: Ronda Castro MD (Tel: [phone])      Subjective: Jose Rafaelwarren Nighat Pt seen this am   He is in good spirits  Appetite improving  Remains afebrile      [facility]  Currently receiving  oral K supplements  Has oral lesions from his current chemo which is infusing. Reviewed interval ancillary notes    Current Medications  0.9 % sodium chloride infusion, PRN  potassium phosphate (monobasic) (K-PHOS) tablet 1,000 mg, 4x Daily WC  [START ON 10/14/2021] pegfilgrastim (NEULASTA) injection 6 mg, Once  magic (miracle) mouthwash with nystatin, 4x Daily PRN  sodium chloride flush 0.9 % injection 5-40 mL, 2 times per day  sodium chloride flush 0.9 % injection 5-40 mL, PRN  0.9 % sodium chloride infusion, PRN  enoxaparin (LOVENOX) injection 40 mg, Daily  acetaminophen (TYLENOL) tablet 650 mg, Q6H PRN   Or  acetaminophen (TYLENOL) suppository 650 mg, Q6H PRN  magnesium sulfate 2000 mg in 50 mL IVPB premix, PRN  potassium chloride 10 mEq/100 mL IVPB (Peripheral Line), PRN        Objective:  /87   Pulse 85   Temp 98.4 °F (36.9 °C) (Oral)   Resp 18   Ht 5' 11\" (1.803 m)   Wt 175 lb 3.2 oz (79.5 kg)   SpO2 100%   BMI 24.44 kg/m²     Intake/Output Summary (Last 24 hours) at 10/13/2021 1320  Last data filed at 10/13/2021 0559  Gross per 24 hour   Intake 3374.83 ml   Output --   Net 3374.83 ml      Wt Readings from Last 3 Encounters:   10/13/21 175 lb 3.2 oz (79.5 kg)   04/08/21 180 lb (81.6 kg)   03/10/21 181 lb 12.8 oz (82.5 kg)       General appearance:  Appears comfortable, but looks chronically ill and frail . He is in good spirits today   Eyes: Sclera clear. Pupils equal.  ENT: Moist oral mucosa. Trachea midline, no adenopathy.  Oral lesions noted on the side of his tongue  Cardiovascular: Regular rhythm, normal S1, S2. No murmur. No edema in lower extremities  Respiratory: Not using accessory muscles. Good inspiratory effort. Clear to auscultation bilaterally, no wheeze or crackles. GI: Abdomen soft with bowel sounds present , no current guarding or pain   Musculoskeletal: No cyanosis in digits, neck supple  Neurology: CN 2-12 grossly intact. No speech or motor deficits  Psych: Normal affect. Alert and oriented in time, place and person  Skin: Warm, dry, normal turgor    Labs and Tests:  CBC:   Recent Labs     10/11/21  1610 10/13/21  0458   WBC 3.7* 1.5*   HGB 8.2* 6.6*   PLT 93* 69*     BMP:    Recent Labs     10/11/21  1610 10/11/21  1610 10/12/21  0443 10/12/21  0747 10/12/21  1535 10/12/21  2356 10/13/21  0458     --  139  --   --   --  142   K 2.3*   < > 2.6*   < > 3.2* 3.4* 3.2*     --  103  --   --   --  109   CO2 24  --  26  --   --   --  25   BUN 4*  --  8  --   --   --  6*   CREATININE 0.8  --  0.7*  --   --   --  0.5*   GLUCOSE 167*  --  191*  --   --   --  102*    < > = values in this interval not displayed. Hepatic: No results for input(s): AST, ALT, ALB, BILITOT, ALKPHOS in the last 72 hours. Problem List  Active Problems:    Acute hypokalemia    Hypokalemia  Resolved Problems:    * No resolved hospital problems. *       Assessment & Plan:   1. Anemia:  Likely due to chronic disease with current chemo:  Will give 1 unit PRBC and follow closely. 2. Neutropenia: due to chemo. He is afebrile in precautions. I spoke with oncology this am .  They will order Neulasta today  3. Hypokalemia:  Improving with oral therapy. 4. Low magnesium: improved with replacement therapy   5. Colon Cancer with mets: follows with Dr Carmen Jackson,  On chemo. PET scan shows response to therapy . Oncology notes reviewed   6. No needs anticipated at d/c       Diet: ADULT DIET;  Regular  Code:Full Code  DVT PPX      Lachelle Brink, VIJAY - CNP 10/13/2021 1:20 PM

## 2021-10-13 NOTE — PLAN OF CARE
Problem: Physical Regulation:  Goal: Will show no signs and symptoms of electrolyte imbalance  Description: Will show no signs and symptoms of electrolyte imbalance  10/13/2021 0843 by Moraima Rice RN  Outcome: Ongoing  Note: Patient VSS at this time on room air. K 3.2 this morning - IV replacement in progress. Will continue to monitor. Problem: Pain:  Goal: Pain level will decrease  Description: Pain level will decrease  10/13/2021 0843 by Moraima Rice RN  Outcome: Ongoing  Note: Patient denies any pain at this time. Will continue to monitor.

## 2021-10-13 NOTE — CONSULTS
Consult received     severe hypokalemia 2/2 FOLFOX   Continue to replace Po and IV   Also , monitor mag levels     Monitor K level every 8 hrs

## 2021-10-13 NOTE — PROGRESS NOTES
After talking with patient repeat K will be drawn/rechecked with morning labs as it took until now to finish his K replacement and morning labs will be drawn in 3 hours.

## 2021-10-13 NOTE — PROGRESS NOTES
Oncology and Hematology Care   Progress Note      10/13/2021 11:57 AM        Name: Dede Del Rsoario . Admitted: 10/11/2021    SUBJECTIVE:  Doing okay. No new complaints. No chest pain or shortness of breath. Afebrile. Reviewed interval ancillary notes    Current Medications  0.9 % sodium chloride infusion, PRN  potassium phosphate (monobasic) (K-PHOS) tablet 1,000 mg, 4x Daily WC  [START ON 10/14/2021] pegfilgrastim (NEULASTA) injection 6 mg, Once  magic (miracle) mouthwash with nystatin, 4x Daily PRN  sodium chloride flush 0.9 % injection 5-40 mL, 2 times per day  sodium chloride flush 0.9 % injection 5-40 mL, PRN  0.9 % sodium chloride infusion, PRN  enoxaparin (LOVENOX) injection 40 mg, Daily  acetaminophen (TYLENOL) tablet 650 mg, Q6H PRN   Or  acetaminophen (TYLENOL) suppository 650 mg, Q6H PRN  magnesium sulfate 2000 mg in 50 mL IVPB premix, PRN  potassium chloride 10 mEq/100 mL IVPB (Peripheral Line), PRN        Objective:  /70   Pulse 80   Temp 98.4 °F (36.9 °C) (Oral)   Resp 18   Ht 5' 11\" (1.803 m)   Wt 175 lb 3.2 oz (79.5 kg)   SpO2 100%   BMI 24.44 kg/m²     Intake/Output Summary (Last 24 hours) at 10/13/2021 1157  Last data filed at 10/13/2021 0559  Gross per 24 hour   Intake 3374.83 ml   Output --   Net 3374.83 ml      Wt Readings from Last 3 Encounters:   10/13/21 175 lb 3.2 oz (79.5 kg)   04/08/21 180 lb (81.6 kg)   03/10/21 181 lb 12.8 oz (82.5 kg)       General appearance:  Appears comfortable  Eyes: Sclera clear. Pupils equal.  ENT: Moist oral mucosa. Trachea midline, no adenopathy. Cardiovascular: Regular rhythm, normal S1, S2. No murmur. No edema in lower extremities  Respiratory: Not using accessory muscles. Good inspiratory effort. Clear to auscultation bilaterally, no wheeze or crackles. GI: Abdomen soft, no tenderness, not distended  Musculoskeletal: No cyanosis in digits, neck supple  Neurology: CN 2-12 grossly intact.  No speech or motor deficits  Psych: Normal affect. Alert and oriented in time, place and person  Skin: Warm, dry, normal turgor    Labs and Tests:  CBC:   Recent Labs     10/11/21  1610 10/13/21  0458   WBC 3.7* 1.5*   HGB 8.2* 6.6*   PLT 93* 69*     BMP:    Recent Labs     10/11/21  1610 10/11/21  1610 10/12/21  0443 10/12/21  0747 10/12/21  1535 10/12/21  2356 10/13/21  0458     --  139  --   --   --  142   K 2.3*   < > 2.6*   < > 3.2* 3.4* 3.2*     --  103  --   --   --  109   CO2 24  --  26  --   --   --  25   BUN 4*  --  8  --   --   --  6*   CREATININE 0.8  --  0.7*  --   --   --  0.5*   GLUCOSE 167*  --  191*  --   --   --  102*    < > = values in this interval not displayed. Hepatic: No results for input(s): AST, ALT, ALB, BILITOT, ALKPHOS in the last 72 hours. ASSESSMENT AND PLAN    Active Problems:    Acute hypokalemia    Hypokalemia  Resolved Problems:    * No resolved hospital problems. *    55 year old male with a history of hypertension. He has:    1. Metastatic colon cancer  -Currently treated with FOLFOX + Avastin  -5-FU infusion will finish today. 2. Pancytopenia  -Due to chemotherapy.  -Transfuse 1 unit PRBC today.  -Plan for neulasta. 3. Severe hypokalemia   -Receiving IV replacement. Hayley Nixon, [address] [facility]  Oncology Hematology Care, [facility].  [phone]    Patient was seen and examined. Agree with above. Continue current care. Hb is down to 6.6  He will be getting 1 unit of PRBC. From oncology perspective, the patient may be discharged after blood transfusion. He will need Neulasta injection on 10/14/2021 which can be done in the office.     Stella Byers MD

## 2021-10-13 NOTE — PLAN OF CARE
Problem: Fluid Volume:  Goal: Ability to achieve a balanced intake and output will improve  Description: Ability to achieve a balanced intake and output will improve  Outcome: Ongoing     Problem: Physical Regulation:  Goal: Ability to maintain clinical measurements within normal limits will improve  Description: Ability to maintain clinical measurements within normal limits will improve  Outcome: Ongoing  Goal: Will show no signs and symptoms of electrolyte imbalance  Description: Will show no signs and symptoms of electrolyte imbalance  Outcome: Ongoing     Problem: Pain:  Goal: Pain level will decrease  Description: Pain level will decrease  Outcome: Ongoing

## 2021-10-14 VITALS
WEIGHT: 175.2 LBS | BODY MASS INDEX: 24.53 KG/M2 | HEIGHT: 71 IN | OXYGEN SATURATION: 99 % | SYSTOLIC BLOOD PRESSURE: 116 MMHG | RESPIRATION RATE: 16 BRPM | HEART RATE: 85 BPM | DIASTOLIC BLOOD PRESSURE: 77 MMHG | TEMPERATURE: 98.1 F

## 2021-10-14 LAB
ALBUMIN SERPL-MCNC: 2.5 G/DL (ref 3.4–5)
ANION GAP SERPL CALCULATED.3IONS-SCNC: 8 MMOL/L (ref 3–16)
BASOPHILS ABSOLUTE: 0 K/UL (ref 0–0.2)
BASOPHILS RELATIVE PERCENT: 0.3 %
BUN BLDV-MCNC: 5 MG/DL (ref 7–20)
CALCIUM SERPL-MCNC: 7.9 MG/DL (ref 8.3–10.6)
CHLORIDE BLD-SCNC: 105 MMOL/L (ref 99–110)
CO2: 23 MMOL/L (ref 21–32)
CREAT SERPL-MCNC: 0.5 MG/DL (ref 0.8–1.3)
CREATININE URINE: 59.5 MG/DL (ref 39–259)
EOSINOPHILS ABSOLUTE: 0 K/UL (ref 0–0.6)
EOSINOPHILS RELATIVE PERCENT: 0.5 %
FERRITIN: 541.9 NG/ML (ref 30–400)
GFR AFRICAN AMERICAN: >60
GFR NON-AFRICAN AMERICAN: >60
GLUCOSE BLD-MCNC: 141 MG/DL (ref 70–99)
HCT VFR BLD CALC: 24.8 % (ref 40.5–52.5)
HEMOGLOBIN: 8.2 G/DL (ref 13.5–17.5)
IRON SATURATION: 95 % (ref 20–50)
IRON: 149 UG/DL (ref 59–158)
LYMPHOCYTES ABSOLUTE: 0.5 K/UL (ref 1–5.1)
LYMPHOCYTES RELATIVE PERCENT: 10.4 %
MAGNESIUM: 1.9 MG/DL (ref 1.8–2.4)
MAGNESIUM: 1.9 MG/DL (ref 1.8–2.4)
MCH RBC QN AUTO: 32 PG (ref 26–34)
MCHC RBC AUTO-ENTMCNC: 33.1 G/DL (ref 31–36)
MCV RBC AUTO: 96.6 FL (ref 80–100)
MONOCYTES ABSOLUTE: 0.1 K/UL (ref 0–1.3)
MONOCYTES RELATIVE PERCENT: 1.2 %
NEUTROPHILS ABSOLUTE: 4.2 K/UL (ref 1.7–7.7)
NEUTROPHILS RELATIVE PERCENT: 87.6 %
PDW BLD-RTO: 23.7 % (ref 12.4–15.4)
PHOSPHORUS: 2.6 MG/DL (ref 2.5–4.9)
PHOSPHORUS: 3 MG/DL (ref 2.5–4.9)
PHOSPHORUS: 3.4 MG/DL (ref 2.5–4.9)
PLATELET # BLD: 93 K/UL (ref 135–450)
PMV BLD AUTO: 6.7 FL (ref 5–10.5)
POTASSIUM SERPL-SCNC: 3.5 MMOL/L (ref 3.5–5.1)
POTASSIUM SERPL-SCNC: 3.8 MMOL/L (ref 3.5–5.1)
POTASSIUM SERPL-SCNC: 4 MMOL/L (ref 3.5–5.1)
POTASSIUM, UR: 13.9 MMOL/L
RBC # BLD: 2.57 M/UL (ref 4.2–5.9)
REASON FOR REJECTION: NORMAL
REJECTED TEST: NORMAL
SODIUM BLD-SCNC: 136 MMOL/L (ref 136–145)
TOTAL IRON BINDING CAPACITY: 157 UG/DL (ref 260–445)
WBC # BLD: 4.8 K/UL (ref 4–11)

## 2021-10-14 PROCEDURE — 6360000002 HC RX W HCPCS: Performed by: NURSE PRACTITIONER

## 2021-10-14 PROCEDURE — 36415 COLL VENOUS BLD VENIPUNCTURE: CPT

## 2021-10-14 PROCEDURE — 84132 ASSAY OF SERUM POTASSIUM: CPT

## 2021-10-14 PROCEDURE — 82728 ASSAY OF FERRITIN: CPT

## 2021-10-14 PROCEDURE — 83735 ASSAY OF MAGNESIUM: CPT

## 2021-10-14 PROCEDURE — 83550 IRON BINDING TEST: CPT

## 2021-10-14 PROCEDURE — 6360000002 HC RX W HCPCS: Performed by: INTERNAL MEDICINE

## 2021-10-14 PROCEDURE — 6370000000 HC RX 637 (ALT 250 FOR IP): Performed by: INTERNAL MEDICINE

## 2021-10-14 PROCEDURE — 83540 ASSAY OF IRON: CPT

## 2021-10-14 PROCEDURE — 80069 RENAL FUNCTION PANEL: CPT

## 2021-10-14 PROCEDURE — 85025 COMPLETE CBC W/AUTO DIFF WBC: CPT

## 2021-10-14 PROCEDURE — 6370000000 HC RX 637 (ALT 250 FOR IP): Performed by: NURSE PRACTITIONER

## 2021-10-14 PROCEDURE — 84100 ASSAY OF PHOSPHORUS: CPT

## 2021-10-14 RX ORDER — POTASSIUM CHLORIDE 20 MEQ/1
20 TABLET, EXTENDED RELEASE ORAL 2 TIMES DAILY WITH MEALS
Status: DISCONTINUED | OUTPATIENT
Start: 2021-10-14 | End: 2021-10-14 | Stop reason: HOSPADM

## 2021-10-14 RX ORDER — POTASSIUM CHLORIDE 20 MEQ/1
20 TABLET, EXTENDED RELEASE ORAL DAILY
Qty: 30 TABLET | Refills: 0 | Status: ON HOLD
Start: 2021-10-14 | End: 2021-12-02 | Stop reason: HOSPADM

## 2021-10-14 RX ADMIN — POTASSIUM CHLORIDE 20 MEQ: 20 TABLET, EXTENDED RELEASE ORAL at 12:38

## 2021-10-14 RX ADMIN — POTASSIUM CHLORIDE 20 MEQ: 20 TABLET, EXTENDED RELEASE ORAL at 18:48

## 2021-10-14 RX ADMIN — ENOXAPARIN SODIUM 40 MG: 40 INJECTION SUBCUTANEOUS at 09:27

## 2021-10-14 RX ADMIN — POTASSIUM PHOSPHATE, MONOBASIC 1000 MG: 500 TABLET, SOLUBLE ORAL at 09:26

## 2021-10-14 RX ADMIN — PEGFILGRASTIM 6 MG: 6 INJECTION SUBCUTANEOUS at 11:25

## 2021-10-14 ASSESSMENT — PAIN SCALES - GENERAL
PAINLEVEL_OUTOF10: 0

## 2021-10-14 NOTE — PLAN OF CARE
Problem: Fluid Volume:  Goal: Ability to achieve a balanced intake and output will improve  Description: Ability to achieve a balanced intake and output will improve  Outcome: Ongoing     Problem: Physical Regulation:  Goal: Ability to maintain clinical measurements within normal limits will improve  Description: Ability to maintain clinical measurements within normal limits will improve  Outcome: Ongoing  Goal: Will show no signs and symptoms of electrolyte imbalance  Description: Will show no signs and symptoms of electrolyte imbalance  Outcome: Ongoing     Problem: Pain:  Goal: Pain level will decrease  Description: Pain level will decrease  Outcome: Ongoing  Goal: Control of acute pain  Description: Control of acute pain  Outcome: Ongoing  Goal: Control of chronic pain  Description: Control of chronic pain  Outcome: Ongoing

## 2021-10-14 NOTE — PROGRESS NOTES
Oncology and Hematology Care   Progress Note      10/14/2021 12:12 PM        Name: Den Ledesma . Admitted: 10/11/2021    SUBJECTIVE:  He's doing well. Has no new complaints. Reviewed interval ancillary notes    Current Medications  potassium chloride (KLOR-CON M) extended release tablet 20 mEq, BID WC  0.9 % sodium chloride infusion, PRN  magic (miracle) mouthwash with nystatin, 4x Daily PRN  sodium chloride flush 0.9 % injection 5-40 mL, 2 times per day  sodium chloride flush 0.9 % injection 5-40 mL, PRN  0.9 % sodium chloride infusion, PRN  enoxaparin (LOVENOX) injection 40 mg, Daily  acetaminophen (TYLENOL) tablet 650 mg, Q6H PRN   Or  acetaminophen (TYLENOL) suppository 650 mg, Q6H PRN  magnesium sulfate 2000 mg in 50 mL IVPB premix, PRN  potassium chloride 10 mEq/100 mL IVPB (Peripheral Line), PRN        Objective:  /75   Pulse 84   Temp 98.5 °F (36.9 °C) (Oral)   Resp 16   Ht 5' 11\" (1.803 m)   Wt 175 lb 3.2 oz (79.5 kg)   SpO2 99%   BMI 24.44 kg/m²     Intake/Output Summary (Last 24 hours) at 10/14/2021 1212  Last data filed at 10/13/2021 1500  Gross per 24 hour   Intake 302.08 ml   Output --   Net 302.08 ml      Wt Readings from Last 3 Encounters:   10/13/21 175 lb 3.2 oz (79.5 kg)   04/08/21 180 lb (81.6 kg)   03/10/21 181 lb 12.8 oz (82.5 kg)       General appearance:  Appears comfortable  Eyes: Sclera clear. Pupils equal.  ENT: Moist oral mucosa. Trachea midline, no adenopathy. Cardiovascular: Regular rhythm, normal S1, S2. No murmur. No edema in lower extremities  Respiratory: Not using accessory muscles. Good inspiratory effort. Clear to auscultation bilaterally, no wheeze or crackles. GI: Abdomen soft, no tenderness, not distended  Musculoskeletal: No cyanosis in digits, neck supple  Neurology: CN 2-12 grossly intact. No speech or motor deficits  Psych: Normal affect.  Alert and oriented in time, place and person  Skin: Warm, dry, normal turgor    Labs and Tests:  CBC:   Recent Labs     10/11/21  1610 10/13/21  0458 10/13/21  1640   WBC 3.7* 1.5*  --    HGB 8.2* 6.6* 7.3*   PLT 93* 69*  --      BMP:    Recent Labs     10/12/21  0443 10/12/21  0747 10/13/21  0458 10/13/21  1327 10/13/21  2145 10/14/21  0540 10/14/21  0930     --  142  --   --   --  136   K 2.6*   < > 3.2*   < > 3.6 3.8 3.5     --  109  --   --   --  105   CO2 26  --  25  --   --   --  23   BUN 8  --  6*  --   --   --  5*   CREATININE 0.7*  --  0.5*  --   --   --  0.5*   GLUCOSE 191*  --  102*  --   --   --  141*    < > = values in this interval not displayed. Hepatic: No results for input(s): AST, ALT, ALB, BILITOT, ALKPHOS in the last 72 hours. ASSESSMENT AND PLAN    Active Problems:    Acute hypokalemia    Hypokalemia  Resolved Problems:    * No resolved hospital problems. *         59 year old male with a history of hypertension. He has:     1. Metastatic colon cancer  -Currently treated with FOLFOX + Avastin  -5-FU pump disconnected 10/13/21. Will need to bring pump to [facility] office at his next visit. 2. Pancytopenia  -Due to chemotherapy.  -Hgb 7.5 s/p PRBC transfusion 10/13.  -Neulasta today. 3. Severe hypokalemia   -Improved. -Seen by nephrology. Okay for discharge from Oncology standpoint. Pardeep Marcos, [address] [facility]  Oncology Hematology [address].  [phone]    The patient was seen and examined. Agree with above. Will be going home today.   Will make arrangements for outpatient follow-up    Mason Patel MD

## 2021-10-14 NOTE — DISCHARGE SUMMARY
[facility]    Patient Demographics    Patient. Arden Tong  Date of Birth. 1960  MRN. [id number]     Primary care provider. Virginia Parker MD  (Tel: [phone])    Admit date: 10/11/2021    Discharge date 10/14/2021  Note Date: 10/14/2021     Reason for Hospitalization. Chief Complaint   Patient presents with    Abnormal Lab     Pt had lab work done at [facility] told K+ level was 1.9          Significant Findings. Active Problems:    Acute hypokalemia    Hypokalemia  Resolved Problems:    * No resolved hospital problems. *       Problems and results from this hospitalization that need follow up. 1. Hypokalemia:  Follow up with [facility] [place] K level in 2 days     Significant test results and incidental findings. Invasive procedures and treatments. [facility]. The patient was admitted at the advice of oncology for K of 2.3 . He is currently receiving chemotherapy for colon cancer with mets. He was admitted and followed by oncology and nephrology who help with K management. He was treated for the followin. Anemia:  Likely due to chronic disease with current chemo:  on 10/13 Hgb drifted to 6.6 and he received 1 unit PRBC. On day of d/c his Hgb was 8.2. He did not have any obvious signs of bleeding. 2. Neutropenia: due to chemo. He received Neulasta on 10/13/21. He remained afebrile during his stay. WBC was 4 on day of d/c   3. Hypokalemia: K was 2.3 upon admission and he required aggressive IV and Po replacement therapy. He was taking K phos prior to admission ( 500mg bid , because he reported previous rash r/t previous K tabs )  He was transitioned to K dur and will monitor closely for any rash. He was having 2 - 3 stools per day at the time of d/c which is his normal.  K was 4.0 on day of d/c and he was sent home on 20 meq daily. 4. Low magnesium: improved with replacement therapy . Mag 1.9 on day of d/c   5. Colon Cancer with mets: follows with Dr Carlota Rivero,  On chemo during his stay, however this was completed on 10/13 and his pump was removed. PET scan shows response to therapy . Consults. IP CONSULT TO ONCOLOGY  IP CONSULT TO NEPHROLOGY    Physical examination on discharge day. /75   Pulse 84   Temp 98.5 °F (36.9 °C) (Oral)   Resp 16   Ht 5' 11\" (1.803 m)   Wt 175 lb 3.2 oz (79.5 kg)   SpO2 99%   BMI 24.44 kg/m²   General appearance. Alert. Looks comfortable. Alert and pleasant   HEENT. Sclera clear. Moist mucus membranes. Cardiovascular. Regular rate and rhythm, normal S1, S2. No murmur. Respiratory. Not using accessory muscles. Clear to auscultation bilaterally, no wheeze. Gastrointestinal. Abdomen soft, non-tender, not distended, normal bowel sounds  Neurology. Facial symmetry. No speech deficits. Moving all extremities equally. Extremities. No edema in lower extremities. Skin. Warm, dry, normal turgor    Condition at time of discharge stable     Medication instructions provided to patient at discharge. Medication List      START taking these medications    potassium chloride 20 MEQ extended release tablet  Commonly known as: KLOR-CON M  Take 1 tablet by mouth daily for 2 days  Notes to patient: Potassium chloride/ Klor-Con  Use: restore potassium in your body  Side effects: stomach upset        CONTINUE taking these medications    dicyclomine 10 MG capsule  Commonly known as: Bentyl  Take 1 capsule by mouth 3 times daily as needed (cramping)     lidocaine-prilocaine 2.5-2.5 % cream  Commonly known as: EMLA  Apply a thick layer over the port 30 minutes prior to treatment and cover with saran wrap.      Lomotil 2.5-0.025 MG per tablet  Generic drug: diphenoxylate-atropine     Magic Mouthwash  Commonly known as: Miracle Mouthwash     sodium chloride 0.9 % SOLN with fluorouracil 500 MG/10ML SOLN        STOP taking these medications    potassium phosphate (monobasic) 500 MG tablet  Commonly known as: K-PHOS           Where to Get Your Medications      These medications were sent to [address], [address] [address] [facility] [phone]  [address], [address]    Phone: [phone]   · potassium chloride 20 MEQ extended release tablet         Discharge recommendations given to patient. Follow Up. in 1 week   Disposition. Home   Activity. As tolerated   Diet: ADULT DIET; Regular      Spent > 30  minutes in discharge process.     Signed:  VIJAY Zuluaga CNP     10/14/2021 11:19 AM

## 2021-10-14 NOTE — PROGRESS NOTES
Patient discharged at this time. IV and tele removed. Port de-accessed. Transported downstairs to patient's car by RN.

## 2021-10-14 NOTE — CONSULTS
never used smokeless tobacco. He reports current alcohol use of about 2.0 standard drinks of alcohol per week. He reports that he does not use drugs.         Allergies:  Potassium-containing compounds    Current Medications:    0.9 % sodium chloride infusion, PRN  potassium phosphate (monobasic) (K-PHOS) tablet 1,000 mg, 4x Daily WC  pegfilgrastim (NEULASTA) injection 6 mg, Once  magic (miracle) mouthwash with nystatin, 4x Daily PRN  sodium chloride flush 0.9 % injection 5-40 mL, 2 times per day  sodium chloride flush 0.9 % injection 5-40 mL, PRN  0.9 % sodium chloride infusion, PRN  enoxaparin (LOVENOX) injection 40 mg, Daily  acetaminophen (TYLENOL) tablet 650 mg, Q6H PRN   Or  acetaminophen (TYLENOL) suppository 650 mg, Q6H PRN  magnesium sulfate 2000 mg in 50 mL IVPB premix, PRN  potassium chloride 10 mEq/100 mL IVPB (Peripheral Line), PRN        Review of Systems:   14 point ROS obtained but were negative except mentioned in HPI      Physical exam:     Vitals:  /71   Pulse 83   Temp 98.6 °F (37 °C) (Oral)   Resp 16   Ht 5' 11\" (1.803 m)   Wt 175 lb 3.2 oz (79.5 kg)   SpO2 99%   BMI 24.44 kg/m²   Constitutional:  OAA X3 NAD  Skin: no rash, turgor wnl  Heent:  eomi, mmm  Neck: no bruits or jvd noted  Cardiovascular:  S1, S2 without m/r/g  Respiratory: CTA B without w/r/r  Abdomen:  +bs, soft, nt, nd  Ext: no  lower extremity edema  Psychiatric: mood and affect appropriate  Musculoskeletal:  Rom, muscular strength intact    Labs:  CBC:   Recent Labs     10/11/21  1610 10/13/21  0458 10/13/21  1640   WBC 3.7* 1.5*  --    HGB 8.2* 6.6* 7.3*   PLT 93* 69*  --      BMP:    Recent Labs     10/11/21  1610 10/11/21  1610 10/12/21  0443 10/12/21  0747 10/13/21  0458 10/13/21  0458 10/13/21  1327 10/13/21  2145 10/14/21  0540     --  139  --  142  --   --   --   --    K 2.3*   < > 2.6*   < > 3.2*   < > 3.3* 3.6 3.8     --  103  --  109  --   --   --   --    CO2 24  --  26  --  25  --   --   --   -- BUN 4*  --  8  --  6*  --   --   --   --    CREATININE 0.8  --  0.7*  --  0.5*  --   --   --   --    GLUCOSE 167*  --  191*  --  102*  --   --   --   --     < > = values in this interval not displayed. Ca/Mg/Phos:   Recent Labs     10/11/21  1610 10/11/21  1610 10/12/21  0443 10/12/21  0747 10/13/21  0458 10/13/21  0458 10/13/21  1327 10/13/21  2145 10/14/21  0540   CALCIUM 8.3  --  8.0*  --  7.8*  --   --   --   --    MG 1.40*   < > 2.10   < > 1.80   < > 1.80 1.90 1.90   PHOS  --   --   --    < > 2.9   < > 2.7 3.2 3.0    < > = values in this interval not displayed. Hepatic: No results for input(s): AST, ALT, ALB, BILITOT, ALKPHOS in the last 72 hours. Troponin: No results for input(s): TROPONINI in the last 72 hours. BNP: No results for input(s): BNP in the last 72 hours. Lipids: No results for input(s): CHOL, TRIG, HDL, LDLCALC, LABVLDL in the last 72 hours. ABGs: No results for input(s): PHART, PO2ART, KKY3KDU in the last 72 hours. INR: No results for input(s): INR in the last 72 hours. UA:No results for input(s): Chetna Kalyan, GLUCOSEU, BILIRUBINUR, Flynn Robin, BLOODU, PHUR, PROTEINU, UROBILINOGEN, NITRU, LEUKOCYTESUR, LABMICR, URINETYPE in the last 72 hours. Urine Microscopic: No results for input(s): LABCAST, BACTERIA, COMU, HYALCAST, WBCUA, RBCUA, EPIU in the last 72 hours. Urine Culture: No results for input(s): LABURIN in the last 72 hours. Urine Chemistry: No results for input(s): Mark Waleska, PROTEINUR, NAUR in the last 72 hours. IMAGING:  No orders to display                       Assessment/Plan :      1. severe hypokalemia 2/2 FOLFOX   Continue to replace Po and IV   Also , monitor mag levels      Had last dose of chemotherapy yesterday   Change to KCL ER 20 meq po BID   D/w patient. He mentioned he had mild rash in past but has been able to tolerate k phos.        Will need potassium monitoring every 3-4 days and dose adjustment             D/w primary team      Thank you for allowing us to participate in care of Roxana Aguillon         Electronically signed by: Aureliano Oviedo MD, 10/14/2021, 7:12 AM      [facility] [address]  Office : [phone]  Fax :[phone]

## 2021-10-15 ENCOUNTER — CARE COORDINATION (OUTPATIENT)
Dept: CASE MANAGEMENT | Age: 61
End: 2021-10-15

## 2021-10-15 DIAGNOSIS — E87.6 HYPOKALEMIA: Primary | ICD-10-CM

## 2021-10-15 NOTE — CARE COORDINATION
[facility] Transitions Initial Follow Up Call    Call within 2 business days of discharge: Yes    Patient: Teresa Johnson Patient : 1960   MRN: [id number]  Reason for Admission:   Discharge Date: 10/14/21 RARS: Readmission Risk Score: 20      Last Discharge [facility]       Complaint Diagnosis Description Type Department Provider    10/11/21 Abnormal Lab Hypokalemia . .. ED to Hosp-Admission (Discharged) (ADMITTED) Kristie Gaines MD; Mariana Franco. .. Non-face-to-face services provided:  Obtained and reviewed discharge summary and/or continuity of care documents  1111F completed      Transitions of Care Initial Call    Was this an external facility discharge? No Discharge Facility:     Challenges to be reviewed by the provider   Additional needs identified to be addressed with provider: No  none             Method of communication with provider : none      Advance Care Planning:   Does patient have an Advance Directive: reviewed and current. Was this a readmission? No  Patient stated reason for admission: abnormal labs  Patients top risk factors for readmission: medical condition-anemia, neutropenia, hypokalemia, CA    Care Transition Nurse (CTN) contacted the patient by telephone to perform post hospital discharge assessment. Verified name and  with patient as identifiers. Provided introduction to self, and explanation of the CTN role. CTN reviewed discharge instructions, medical action plan and red flags with patient who verbalized understanding. Patient given an opportunity to ask questions and does not have any further questions or concerns at this time. Were discharge instructions available to patient? Yes. Reviewed appropriate site of care based on symptoms and resources available to patient including: When to call 911. The patient agrees to contact the PCP office for questions related to their healthcare.      Medication reconciliation was performed with patient,

## 2021-10-22 ENCOUNTER — CARE COORDINATION (OUTPATIENT)
Dept: CASE MANAGEMENT | Age: 61
End: 2021-10-22

## 2021-10-22 NOTE — CARE COORDINATION
[facility] Transitions Follow Up Call    10/22/2021    Patient: Jose Guevara  Patient : 1960   MRN: <[id number]>  Reason for Admission:   Discharge Date: 10/14/21 RARS: Readmission Risk Score: 20    Patient reports he feels alright but his body will not keep up his Potassium. He is going to get IV potassium 3 times a week. Appetite is poor. He makes himself eat. He drinks protein drinks, ensure and water. No pain, weakness, cough or fever. He is tired a lot. Taking medicine as ordered. Will continue to follow. Care Transitions Follow Up Call    Needs to be reviewed by the provider   Additional needs identified to be addressed with provider: No  none             Method of communication with provider : none      Care Transition Nurse (CTN) contacted the patient by telephone to follow up after admission on 10/11/2021. Verified name and  with patient as identifiers. Addressed changes since last contact: none  Discussed follow-up appointments. If no appointment was previously scheduled, appointment scheduling offered: Yes. Is follow up appointment scheduled within 7 days of discharge? NA. Advance Care Planning:   Does patient have an Advance Directive: reviewed and current. Discussed appropriate site of care based on symptoms and resources available to patient including: Specialist and When to call 911. The patient agrees to contact the PCP office for questions related to their healthcare. Patients top risk factors for readmission: ineffective coping, medical condition  Interventions to address risk factors:       Non-[facility] follow up appointment(s):     CTN provided contact information for future needs. Plan for follow-up call in 5-7 days based on severity of symptoms and risk factors.   Plan for next call: symptom management, self management        Care Transitions Subsequent and Final Call    Subsequent and Final Calls  Care Transitions Interventions  Other Interventions: Follow Up  No future appointments.     Brown Mckeon LPN

## 2021-10-29 ENCOUNTER — HOSPITAL ENCOUNTER (OUTPATIENT)
Dept: ONCOLOGY | Age: 61
Setting detail: INFUSION SERIES
Discharge: HOME | End: 2021-10-29
Payer: COMMERCIAL

## 2021-10-29 ENCOUNTER — CARE COORDINATION (OUTPATIENT)
Dept: CASE MANAGEMENT | Age: 61
End: 2021-10-29

## 2021-10-29 VITALS
TEMPERATURE: 98.9 F | DIASTOLIC BLOOD PRESSURE: 65 MMHG | RESPIRATION RATE: 16 BRPM | HEART RATE: 95 BPM | SYSTOLIC BLOOD PRESSURE: 101 MMHG

## 2021-10-29 LAB
ANION GAP SERPL CALCULATED.3IONS-SCNC: 13 MMOL/L (ref 3–16)
BUN BLDV-MCNC: <2 MG/DL (ref 7–20)
CALCIUM SERPL-MCNC: 7.8 MG/DL (ref 8.3–10.6)
CHLORIDE BLD-SCNC: 106 MMOL/L (ref 99–110)
CO2: 17 MMOL/L (ref 21–32)
CREAT SERPL-MCNC: 0.7 MG/DL (ref 0.8–1.3)
GFR AFRICAN AMERICAN: >60
GFR NON-AFRICAN AMERICAN: >60
GLUCOSE BLD-MCNC: 109 MG/DL (ref 70–99)
POTASSIUM SERPL-SCNC: 2.4 MMOL/L (ref 3.5–5.1)
SODIUM BLD-SCNC: 136 MMOL/L (ref 136–145)

## 2021-10-29 PROCEDURE — 36591 DRAW BLOOD OFF VENOUS DEVICE: CPT

## 2021-10-29 PROCEDURE — 96368 THER/DIAG CONCURRENT INF: CPT

## 2021-10-29 PROCEDURE — 96366 THER/PROPH/DIAG IV INF ADDON: CPT

## 2021-10-29 PROCEDURE — 6360000002 HC RX W HCPCS: Performed by: INTERNAL MEDICINE

## 2021-10-29 PROCEDURE — 80048 BASIC METABOLIC PNL TOTAL CA: CPT

## 2021-10-29 PROCEDURE — 99211 OFF/OP EST MAY X REQ PHY/QHP: CPT

## 2021-10-29 PROCEDURE — 2580000003 HC RX 258: Performed by: INTERNAL MEDICINE

## 2021-10-29 PROCEDURE — 96365 THER/PROPH/DIAG IV INF INIT: CPT

## 2021-10-29 RX ORDER — SODIUM CHLORIDE 0.9 % (FLUSH) 0.9 %
5-40 SYRINGE (ML) INJECTION 2 TIMES DAILY
Status: DISCONTINUED | OUTPATIENT
Start: 2021-10-29 | End: 2021-10-30 | Stop reason: HOSPADM

## 2021-10-29 RX ORDER — POTASSIUM CHLORIDE 7.45 MG/ML
10 INJECTION INTRAVENOUS
Status: COMPLETED | OUTPATIENT
Start: 2021-10-29 | End: 2021-10-29

## 2021-10-29 RX ORDER — MAGNESIUM SULFATE IN WATER 40 MG/ML
2000 INJECTION, SOLUTION INTRAVENOUS ONCE
Status: COMPLETED | OUTPATIENT
Start: 2021-10-29 | End: 2021-10-29

## 2021-10-29 RX ADMIN — POTASSIUM CHLORIDE 10 MEQ: 7.46 INJECTION, SOLUTION INTRAVENOUS at 14:02

## 2021-10-29 RX ADMIN — MAGNESIUM SULFATE HEPTAHYDRATE 2000 MG: 40 INJECTION, SOLUTION INTRAVENOUS at 10:24

## 2021-10-29 RX ADMIN — Medication 10 MEQ: at 12:48

## 2021-10-29 RX ADMIN — POTASSIUM CHLORIDE 10 MEQ: 7.46 INJECTION, SOLUTION INTRAVENOUS at 10:43

## 2021-10-29 RX ADMIN — POTASSIUM CHLORIDE 10 MEQ: 7.46 INJECTION, SOLUTION INTRAVENOUS at 11:46

## 2021-10-29 RX ADMIN — Medication 40 ML: at 09:30

## 2021-10-29 RX ADMIN — Medication 10 MEQ: at 15:04

## 2021-10-29 RX ADMIN — POTASSIUM CHLORIDE 10 MEQ: 7.46 INJECTION, SOLUTION INTRAVENOUS at 09:30

## 2021-10-29 NOTE — PROGRESS NOTES
Patient tolerated potassium and magnesium infusions well, no s/s of adverse reaction. VSS. Discharged to home.

## 2021-10-29 NOTE — PROGRESS NOTES
Dr. Cathy Bumpers office returned call, aware of potassium level of 2.4, stated to continue with the potassium infusion at this time.

## 2021-10-29 NOTE — PROGRESS NOTES
New orders received from Dr. Domonique Torres to infuse an additional 20 meq of potassium and 2 g magnesium. Patient agreeable to treatment. Discussed with pharmacy, both potassium and magnesium can infuse at the same time through patients PAC. Patient states he has had this same treatment in the past and has tolerated. Will continue with ordered treatment.

## 2021-10-29 NOTE — PROGRESS NOTES
Patient continues to tolerated infusion. On last bag of 10 ashley potassium. Will continue to monitor.

## 2021-10-29 NOTE — CARE COORDINATION
[facility] Transitions Follow Up Call    10/29/2021    Patient: Luis Wilson  Patient : 1960   MRN: [id number]  Reason for Admission:   Discharge Date: 10/14/21 RARS: Readmission Risk Score: 20         Per Epic, pt is at the infusion center  today, this nurse will follow up at a later date. Follow Up  No future appointments.     Britt Colbert RN

## 2021-10-29 NOTE — PROGRESS NOTES
Lab called with panic potassium level of 2.4. Called Dr. Andreina Rowan office to notify, message left, awaiting on call back. Potassium infusion started at this time per Dr. Jenifer Koch, patient tolerating well.

## 2021-11-02 ENCOUNTER — CARE COORDINATION (OUTPATIENT)
Dept: CASE MANAGEMENT | Age: 61
End: 2021-11-02

## 2021-11-02 NOTE — CARE COORDINATION
[facility] Transitions Follow Up Call    2021    Patient: Dede Del Rosario  Patient : 1960   MRN: <[id number]>  Reason for Admission:   Discharge Date: 10/14/21 RARS: Readmission Risk Score: 20         Spoke with: Miriam Muhammad  Patient states everything is going good. His Potassium is up today. He didn't have to get any. appetite and fluid intake is good. No bladder or bowel problems. He denies pain, weakness, tiredness or bleeding. He is having a relaxed day. Will continue to follow. Care Transitions Follow Up Call    Needs to be reviewed by the provider   Additional needs identified to be addressed with provider: No  none             Method of communication with provider : none      Care Transition Nurse (CTN) contacted the patient by telephone to follow up after admission on 10/11/2021. Verified name and  with patient as identifiers. Addressed changes since last contact: none  Discussed follow-up appointments. If no appointment was previously scheduled, appointment scheduling offered: No.   Is follow up appointment scheduled within 7 days of discharge? No.    Advance Care Planning:   Does patient have an Advance Directive: reviewed and current. CTN reviewed discharge instructions, medical action plan and red flags with patient and discussed any barriers to care and/or understanding of plan of care after discharge. Discussed appropriate site of care based on symptoms and resources available to patient including: PCP and Specialist. The patient agrees to contact the PCP office for questions related to their healthcare. Patients top risk factors for readmission: functional physical ability, ineffective coping, lack of knowledge about disease and level of motivation, medical condition  Interventions to address risk factors: Obtained and reviewed discharge summary and/or continuity of care documents      [facility] follow up appointment(s):     CTN provided contact information for future needs. Plan for follow-up call in 5-7 days based on severity of symptoms and risk factors. Plan for next call: symptom management, self management        Care Transitions Subsequent and Final Call    Subsequent and Final Calls  Do you have any ongoing symptoms?: No  Have your medications changed?: No  Do you have any questions related to your medications?: No  Do you currently have any active services?: Yes  Are you currently active with any services?: Outpatient/Community Services  Do you have any needs or concerns that I can assist you with?: No  Identified Barriers: None  Care Transitions Interventions  Other Interventions: Follow Up  No future appointments.     Jaki Rivera LPN

## 2021-11-09 ENCOUNTER — CARE COORDINATION (OUTPATIENT)
Dept: CASE MANAGEMENT | Age: 61
End: 2021-11-09

## 2021-11-09 NOTE — CARE COORDINATION
[facility] Transitions Follow Up Call    2021    Patient: John Nguyen  Patient : 1960   MRN: [id number]  Reason for Admission:   Discharge Date: 10/14/21 RARS: Readmission Risk Score: 20         Spoke with: [address] Transitions Subsequent and Final Call    Subsequent and Final Calls  Do you have any ongoing symptoms?: No  Have your medications changed?: No  Do you have any questions related to your medications?: No  Do you currently have any active services?: No  Are you currently active with any services?: Outpatient/Community Services  Do you have any needs or concerns that I can assist you with?: No  Identified Barriers: None  Care Transitions Interventions  No Identified Needs  Other Interventions:         Pt states doing real well, no issues or concerns. Potassium continues to improve. States he is taking a break from his chemo until he gets a little healthier. No need for further f/u CTC calls    Follow Up  No future appointments.     Minus URMILA Trevizo

## 2021-11-29 ENCOUNTER — APPOINTMENT (OUTPATIENT)
Dept: CT IMAGING | Age: 61
DRG: 388 | End: 2021-11-29
Payer: COMMERCIAL

## 2021-11-29 ENCOUNTER — HOSPITAL ENCOUNTER (OUTPATIENT)
Dept: GENERAL RADIOLOGY | Age: 61
Discharge: HOME | DRG: 388 | End: 2021-11-29
Payer: COMMERCIAL

## 2021-11-29 ENCOUNTER — HOSPITAL ENCOUNTER (OUTPATIENT)
Age: 61
Discharge: HOME | DRG: 388 | End: 2021-11-29
Payer: COMMERCIAL

## 2021-11-29 ENCOUNTER — HOSPITAL ENCOUNTER (INPATIENT)
Age: 61
LOS: 3 days | Discharge: HOME | DRG: 388 | End: 2021-12-02
Attending: INTERNAL MEDICINE | Admitting: INTERNAL MEDICINE
Payer: COMMERCIAL

## 2021-11-29 DIAGNOSIS — K56.609 SBO (SMALL BOWEL OBSTRUCTION) (HCC): Primary | ICD-10-CM

## 2021-11-29 DIAGNOSIS — R18.8 OTHER ASCITES: ICD-10-CM

## 2021-11-29 DIAGNOSIS — C18.2 ASCENDING COLON MALIGNANT NEOPLASM (HCC): ICD-10-CM

## 2021-11-29 DIAGNOSIS — Z85.038 HISTORY OF COLON CANCER: ICD-10-CM

## 2021-11-29 PROBLEM — K63.89 COLONIC MASS: Status: ACTIVE | Noted: 2021-11-29

## 2021-11-29 PROBLEM — K56.7 ILEUS (HCC): Status: ACTIVE | Noted: 2021-11-29

## 2021-11-29 LAB
A/G RATIO: 0.6 (ref 1.1–2.2)
ALBUMIN SERPL-MCNC: 2.2 G/DL (ref 3.4–5)
ALP BLD-CCNC: 248 U/L (ref 40–129)
ALT SERPL-CCNC: 28 U/L (ref 10–40)
ANION GAP SERPL CALCULATED.3IONS-SCNC: 7 MMOL/L (ref 3–16)
AST SERPL-CCNC: 65 U/L (ref 15–37)
BASOPHILS ABSOLUTE: 0 K/UL (ref 0–0.2)
BASOPHILS RELATIVE PERCENT: 0.6 %
BILIRUB SERPL-MCNC: 1.1 MG/DL (ref 0–1)
BUN BLDV-MCNC: 10 MG/DL (ref 7–20)
CALCIUM SERPL-MCNC: 7.7 MG/DL (ref 8.3–10.6)
CHLORIDE BLD-SCNC: 96 MMOL/L (ref 99–110)
CO2: 32 MMOL/L (ref 21–32)
CREAT SERPL-MCNC: 0.7 MG/DL (ref 0.8–1.3)
EOSINOPHILS ABSOLUTE: 0.1 K/UL (ref 0–0.6)
EOSINOPHILS RELATIVE PERCENT: 0.8 %
GFR AFRICAN AMERICAN: >60
GFR NON-AFRICAN AMERICAN: >60
GLUCOSE BLD-MCNC: 104 MG/DL (ref 70–99)
HCT VFR BLD CALC: 33.1 % (ref 40.5–52.5)
HEMOGLOBIN: 10.9 G/DL (ref 13.5–17.5)
LACTIC ACID, SEPSIS: 3.3 MMOL/L (ref 0.4–1.9)
LIPASE: 10 U/L (ref 13–60)
LYMPHOCYTES ABSOLUTE: 1.1 K/UL (ref 1–5.1)
LYMPHOCYTES RELATIVE PERCENT: 17.6 %
MCH RBC QN AUTO: 32.6 PG (ref 26–34)
MCHC RBC AUTO-ENTMCNC: 33 G/DL (ref 31–36)
MCV RBC AUTO: 98.8 FL (ref 80–100)
MONOCYTES ABSOLUTE: 0.7 K/UL (ref 0–1.3)
MONOCYTES RELATIVE PERCENT: 11.6 %
NEUTROPHILS ABSOLUTE: 4.3 K/UL (ref 1.7–7.7)
NEUTROPHILS RELATIVE PERCENT: 69.4 %
PDW BLD-RTO: 18 % (ref 12.4–15.4)
PLATELET # BLD: 182 K/UL (ref 135–450)
PMV BLD AUTO: 7.9 FL (ref 5–10.5)
POTASSIUM SERPL-SCNC: 3.6 MMOL/L (ref 3.5–5.1)
RBC # BLD: 3.36 M/UL (ref 4.2–5.9)
SODIUM BLD-SCNC: 135 MMOL/L (ref 136–145)
TOTAL PROTEIN: 5.8 G/DL (ref 6.4–8.2)
WBC # BLD: 6.2 K/UL (ref 4–11)

## 2021-11-29 PROCEDURE — 74018 RADEX ABDOMEN 1 VIEW: CPT

## 2021-11-29 PROCEDURE — 83690 ASSAY OF LIPASE: CPT

## 2021-11-29 PROCEDURE — 86140 C-REACTIVE PROTEIN: CPT

## 2021-11-29 PROCEDURE — 83605 ASSAY OF LACTIC ACID: CPT

## 2021-11-29 PROCEDURE — 80053 COMPREHEN METABOLIC PANEL: CPT

## 2021-11-29 PROCEDURE — 1200000000 HC SEMI PRIVATE

## 2021-11-29 PROCEDURE — 85025 COMPLETE CBC W/AUTO DIFF WBC: CPT

## 2021-11-29 PROCEDURE — 6360000004 HC RX CONTRAST MEDICATION: Performed by: PHYSICIAN ASSISTANT

## 2021-11-29 PROCEDURE — 74177 CT ABD & PELVIS W/CONTRAST: CPT

## 2021-11-29 PROCEDURE — 99284 EMERGENCY DEPT VISIT MOD MDM: CPT

## 2021-11-29 PROCEDURE — 85652 RBC SED RATE AUTOMATED: CPT

## 2021-11-29 PROCEDURE — 36415 COLL VENOUS BLD VENIPUNCTURE: CPT

## 2021-11-29 RX ORDER — LORAZEPAM 1 MG/1
TABLET ORAL
COMMUNITY
Start: 2021-11-29

## 2021-11-29 RX ORDER — 0.9 % SODIUM CHLORIDE 0.9 %
1000 INTRAVENOUS SOLUTION INTRAVENOUS ONCE
Status: DISCONTINUED | OUTPATIENT
Start: 2021-11-29 | End: 2021-11-29

## 2021-11-29 RX ORDER — POTASSIUM CHLORIDE 20 MEQ/1
40 TABLET, EXTENDED RELEASE ORAL ONCE
Status: COMPLETED | OUTPATIENT
Start: 2021-11-30 | End: 2021-11-30

## 2021-11-29 RX ORDER — SODIUM CHLORIDE 9 MG/ML
INJECTION, SOLUTION INTRAVENOUS CONTINUOUS
Status: ACTIVE | OUTPATIENT
Start: 2021-11-30 | End: 2021-11-30

## 2021-11-29 RX ADMIN — IOPAMIDOL 75 ML: 755 INJECTION, SOLUTION INTRAVENOUS at 22:52

## 2021-11-29 ASSESSMENT — ENCOUNTER SYMPTOMS
RHINORRHEA: 0
COUGH: 0
DIARRHEA: 0
WHEEZING: 0
ABDOMINAL PAIN: 1
VOMITING: 1
NAUSEA: 1
SHORTNESS OF BREATH: 0

## 2021-11-29 ASSESSMENT — PAIN SCALES - GENERAL: PAINLEVEL_OUTOF10: 3

## 2021-11-29 NOTE — Clinical Note
Patient Class: Inpatient [101]   REQUIRED: Diagnosis: Colonic mass [876928]   Estimated Length of Stay: Estimated stay of more than 2 midnights   Telemetry/Cardiac Monitoring Required?: No

## 2021-11-30 ENCOUNTER — APPOINTMENT (OUTPATIENT)
Dept: GENERAL RADIOLOGY | Age: 61
DRG: 388 | End: 2021-11-30
Payer: COMMERCIAL

## 2021-11-30 LAB
A/G RATIO: 0.6 (ref 1.1–2.2)
ALBUMIN SERPL-MCNC: 2 G/DL (ref 3.4–5)
ALP BLD-CCNC: 225 U/L (ref 40–129)
ALT SERPL-CCNC: 24 U/L (ref 10–40)
ANION GAP SERPL CALCULATED.3IONS-SCNC: 7 MMOL/L (ref 3–16)
AST SERPL-CCNC: 39 U/L (ref 15–37)
BASOPHILS ABSOLUTE: 0 K/UL (ref 0–0.2)
BASOPHILS RELATIVE PERCENT: 0.5 %
BILIRUB SERPL-MCNC: 1.1 MG/DL (ref 0–1)
BUN BLDV-MCNC: 9 MG/DL (ref 7–20)
C DIFF TOXIN/ANTIGEN: NORMAL
C-REACTIVE PROTEIN: 20.5 MG/L (ref 0–5.1)
CALCIUM SERPL-MCNC: 7.7 MG/DL (ref 8.3–10.6)
CHLORIDE BLD-SCNC: 99 MMOL/L (ref 99–110)
CO2: 32 MMOL/L (ref 21–32)
CREAT SERPL-MCNC: 0.7 MG/DL (ref 0.8–1.3)
EOSINOPHILS ABSOLUTE: 0 K/UL (ref 0–0.6)
EOSINOPHILS RELATIVE PERCENT: 1.1 %
GFR AFRICAN AMERICAN: >60
GFR NON-AFRICAN AMERICAN: >60
GLUCOSE BLD-MCNC: 91 MG/DL (ref 70–99)
HCT VFR BLD CALC: 31.4 % (ref 40.5–52.5)
HEMOGLOBIN: 10.3 G/DL (ref 13.5–17.5)
LACTIC ACID, SEPSIS: 1.5 MMOL/L (ref 0.4–1.9)
LYMPHOCYTES ABSOLUTE: 1 K/UL (ref 1–5.1)
LYMPHOCYTES RELATIVE PERCENT: 23 %
MAGNESIUM: 1.8 MG/DL (ref 1.8–2.4)
MCH RBC QN AUTO: 32.6 PG (ref 26–34)
MCHC RBC AUTO-ENTMCNC: 32.9 G/DL (ref 31–36)
MCV RBC AUTO: 99.1 FL (ref 80–100)
MONOCYTES ABSOLUTE: 0.5 K/UL (ref 0–1.3)
MONOCYTES RELATIVE PERCENT: 10.3 %
NEUTROPHILS ABSOLUTE: 3 K/UL (ref 1.7–7.7)
NEUTROPHILS RELATIVE PERCENT: 65.1 %
PDW BLD-RTO: 18.2 % (ref 12.4–15.4)
PLATELET # BLD: 154 K/UL (ref 135–450)
PMV BLD AUTO: 7.9 FL (ref 5–10.5)
POTASSIUM REFLEX MAGNESIUM: 3.1 MMOL/L (ref 3.5–5.1)
RBC # BLD: 3.17 M/UL (ref 4.2–5.9)
SEDIMENTATION RATE, ERYTHROCYTE: 32 MM/HR (ref 0–20)
SODIUM BLD-SCNC: 138 MMOL/L (ref 136–145)
TOTAL PROTEIN: 5.5 G/DL (ref 6.4–8.2)
WBC # BLD: 4.6 K/UL (ref 4–11)

## 2021-11-30 PROCEDURE — 1200000000 HC SEMI PRIVATE

## 2021-11-30 PROCEDURE — 87449 NOS EACH ORGANISM AG IA: CPT

## 2021-11-30 PROCEDURE — 6360000002 HC RX W HCPCS: Performed by: INTERNAL MEDICINE

## 2021-11-30 PROCEDURE — APPNB30 APP NON BILLABLE TIME 0-30 MINS: Performed by: NURSE PRACTITIONER

## 2021-11-30 PROCEDURE — 83735 ASSAY OF MAGNESIUM: CPT

## 2021-11-30 PROCEDURE — 83605 ASSAY OF LACTIC ACID: CPT

## 2021-11-30 PROCEDURE — 80053 COMPREHEN METABOLIC PANEL: CPT

## 2021-11-30 PROCEDURE — 2580000003 HC RX 258: Performed by: INTERNAL MEDICINE

## 2021-11-30 PROCEDURE — 6370000000 HC RX 637 (ALT 250 FOR IP): Performed by: INTERNAL MEDICINE

## 2021-11-30 PROCEDURE — 74018 RADEX ABDOMEN 1 VIEW: CPT

## 2021-11-30 PROCEDURE — 99222 1ST HOSP IP/OBS MODERATE 55: CPT | Performed by: SURGERY

## 2021-11-30 PROCEDURE — 6370000000 HC RX 637 (ALT 250 FOR IP): Performed by: PHYSICIAN ASSISTANT

## 2021-11-30 PROCEDURE — 87505 NFCT AGENT DETECTION GI: CPT

## 2021-11-30 PROCEDURE — APPSS60 APP SPLIT SHARED TIME 46-60 MINUTES: Performed by: NURSE PRACTITIONER

## 2021-11-30 PROCEDURE — 87324 CLOSTRIDIUM AG IA: CPT

## 2021-11-30 PROCEDURE — 6370000000 HC RX 637 (ALT 250 FOR IP): Performed by: HOSPITALIST

## 2021-11-30 PROCEDURE — 85025 COMPLETE CBC W/AUTO DIFF WBC: CPT

## 2021-11-30 PROCEDURE — 36415 COLL VENOUS BLD VENIPUNCTURE: CPT

## 2021-11-30 RX ORDER — POLYETHYLENE GLYCOL 3350 17 G/17G
17 POWDER, FOR SOLUTION ORAL DAILY PRN
Status: DISCONTINUED | OUTPATIENT
Start: 2021-11-30 | End: 2021-12-03 | Stop reason: HOSPADM

## 2021-11-30 RX ORDER — SODIUM CHLORIDE 0.9 % (FLUSH) 0.9 %
5-40 SYRINGE (ML) INJECTION EVERY 12 HOURS SCHEDULED
Status: DISCONTINUED | OUTPATIENT
Start: 2021-11-30 | End: 2021-12-03 | Stop reason: HOSPADM

## 2021-11-30 RX ORDER — SODIUM CHLORIDE 0.9 % (FLUSH) 0.9 %
10 SYRINGE (ML) INJECTION PRN
Status: DISCONTINUED | OUTPATIENT
Start: 2021-11-30 | End: 2021-12-03 | Stop reason: HOSPADM

## 2021-11-30 RX ORDER — POTASSIUM CHLORIDE 20 MEQ/1
40 TABLET, EXTENDED RELEASE ORAL PRN
Status: DISCONTINUED | OUTPATIENT
Start: 2021-11-30 | End: 2021-12-03 | Stop reason: HOSPADM

## 2021-11-30 RX ORDER — POTASSIUM CHLORIDE 7.45 MG/ML
10 INJECTION INTRAVENOUS PRN
Status: DISCONTINUED | OUTPATIENT
Start: 2021-11-30 | End: 2021-12-03 | Stop reason: HOSPADM

## 2021-11-30 RX ORDER — LIDOCAINE HYDROCHLORIDE 20 MG/ML
15 SOLUTION OROPHARYNGEAL
Status: DISCONTINUED | OUTPATIENT
Start: 2021-11-30 | End: 2021-12-03 | Stop reason: HOSPADM

## 2021-11-30 RX ORDER — ONDANSETRON 2 MG/ML
4 INJECTION INTRAMUSCULAR; INTRAVENOUS EVERY 6 HOURS PRN
Status: DISCONTINUED | OUTPATIENT
Start: 2021-11-30 | End: 2021-12-03 | Stop reason: HOSPADM

## 2021-11-30 RX ORDER — ACETAMINOPHEN 325 MG/1
650 TABLET ORAL EVERY 6 HOURS PRN
Status: DISCONTINUED | OUTPATIENT
Start: 2021-11-30 | End: 2021-12-03 | Stop reason: HOSPADM

## 2021-11-30 RX ORDER — DICYCLOMINE HYDROCHLORIDE 10 MG/1
10 CAPSULE ORAL 3 TIMES DAILY PRN
Status: DISCONTINUED | OUTPATIENT
Start: 2021-11-30 | End: 2021-12-03 | Stop reason: HOSPADM

## 2021-11-30 RX ORDER — ACETAMINOPHEN 650 MG/1
650 SUPPOSITORY RECTAL EVERY 6 HOURS PRN
Status: DISCONTINUED | OUTPATIENT
Start: 2021-11-30 | End: 2021-12-03 | Stop reason: HOSPADM

## 2021-11-30 RX ORDER — SODIUM CHLORIDE 9 MG/ML
25 INJECTION, SOLUTION INTRAVENOUS PRN
Status: DISCONTINUED | OUTPATIENT
Start: 2021-11-30 | End: 2021-12-03 | Stop reason: HOSPADM

## 2021-11-30 RX ORDER — ONDANSETRON 4 MG/1
4 TABLET, ORALLY DISINTEGRATING ORAL EVERY 8 HOURS PRN
Status: DISCONTINUED | OUTPATIENT
Start: 2021-11-30 | End: 2021-12-03 | Stop reason: HOSPADM

## 2021-11-30 RX ADMIN — SODIUM CHLORIDE: 9 INJECTION, SOLUTION INTRAVENOUS at 00:58

## 2021-11-30 RX ADMIN — ENOXAPARIN SODIUM 40 MG: 100 INJECTION SUBCUTANEOUS at 20:23

## 2021-11-30 RX ADMIN — LIDOCAINE HYDROCHLORIDE 15 ML: 20 SOLUTION OROPHARYNGEAL at 11:17

## 2021-11-30 RX ADMIN — SODIUM CHLORIDE, PRESERVATIVE FREE 10 ML: 5 INJECTION INTRAVENOUS at 20:25

## 2021-11-30 RX ADMIN — SODIUM CHLORIDE: 9 INJECTION, SOLUTION INTRAVENOUS at 13:22

## 2021-11-30 RX ADMIN — POTASSIUM CHLORIDE 40 MEQ: 20 TABLET, EXTENDED RELEASE ORAL at 00:56

## 2021-11-30 RX ADMIN — POTASSIUM CHLORIDE 40 MEQ: 20 TABLET, EXTENDED RELEASE ORAL at 17:48

## 2021-11-30 ASSESSMENT — PAIN SCALES - GENERAL: PAINLEVEL_OUTOF10: 5

## 2021-11-30 ASSESSMENT — PAIN DESCRIPTION - DESCRIPTORS: DESCRIPTORS: HEADACHE

## 2021-11-30 ASSESSMENT — PAIN DESCRIPTION - PAIN TYPE: TYPE: ACUTE PAIN

## 2021-11-30 NOTE — CONSULTS
Gastroenterology Consult Note        Patient: Sully Pan  : 1960  Acct#:      Date:  2021    Subjective:       History of Present Illness  Patient is a 57 y.o.  male admitted with Ileus ([address] [K56.7]  Ileus ([address] [K56.7]  Colonic mass [K63.89]  Small bowel obstruction ([address] [P10.268] who is seen in consult for SBO. H/o stage IV ascending colon cancer s/p right colectomy with anastomosis 3/2018 with Dr Manuella Olszewski. Has metastatic disease to the lung and liver. Followed by Dr Brenda Ewing. Patient states he has been on chemo up until 10/15/2021 when this was stopped due to low potassium. Since that time he has been having nausea and vomiting which occurs 6 to 8 hours after eating. Tolerates liquids without vomiting. For a couple weeks he has had pain throughout the abdomen. It is intermittent and cramping. Also hears a lot of gurgling. Has been having 2-4 mushy, nonbloody stools daily for 4 months. Came to the ER for all the symptoms and CT was concerning for small bowel obstruction at the ileocolonic anastomosis. Past Medical History:   Diagnosis Date    Anemia     Colon cancer ([address]     LAST CHEMO 18    Hypertension     Lung nodule     metastatic       Past Surgical History:   Procedure Laterality Date    COLONOSCOPY      CYST REMOVAL      Behind right knee    LIVER BIOPSY Right 10/05/2017    phalen MD at [facility] in specials as outpt    OTHER SURGICAL HISTORY  2018     Robotic assisted converted to open right colectomy with anastomosis, greater omental vascularized pedicled flap creation    THORACOSCOPY Right 2018    RIGHT VIDEO ASSISTED THORACOSCOPY WITH WEDGE EXCISION RIGHT    TUNNELED VENOUS PORT PLACEMENT Left 2017    UPPER GASTROINTESTINAL ENDOSCOPY      WISDOM TOOTH EXTRACTION        Past Endoscopic History  EGD and colonoscopy 2017 with Dr. Cristopher Gill for screening and anemia.   Hiatal hernia, distal esophagus 3 small ulcers, mid and distal esophagus with thin rings biopsied for eosinophilic esophagitis, gastric body erosions. Colonoscopy with ulcerated colon mass with deformity near ileocecal valve. Admission Meds  No current facility-administered medications on file prior to encounter. Current Outpatient Medications on File Prior to Encounter   Medication Sig Dispense Refill    potassium chloride (KLOR-CON M) 20 MEQ extended release tablet Take 1 tablet by mouth daily for 2 days 30 tablet 0    diphenoxylate-atropine (LOMOTIL) 2.5-0.025 MG per tablet Take 2.5 mg by mouth.  LORazepam (ATIVAN) 1 MG tablet       sodium chloride 0.9 % SOLN with fluorouracil 500 MG/10ML SOLN Infuse intravenously Over 46 hours Patient pump bag dosage reads: 4750 mg/ mL infusion at 5mL/hr.  dicyclomine (BENTYL) 10 MG capsule Take 1 capsule by mouth 3 times daily as needed (cramping) 30 capsule 3    Magic Mouthwash (MIRACLE MOUTHWASH) Swish and spit 5 mLs 4 times daily as needed for Irritation      lidocaine-prilocaine (EMLA) 2.5-2.5 % cream Apply a thick layer over the port 30 minutes prior to treatment and cover with saran wrap. 1 Tube 5           Allergies  Allergies   Allergen Reactions    Potassium-Containing Compounds Rash      Social   Social History     Tobacco Use    Smoking status: Never Smoker    Smokeless tobacco: Never Used   Substance Use Topics    Alcohol use:  Yes     Alcohol/week: 2.0 standard drinks     Types: 2 Cans of beer per week     Comment: daily         Family History   Problem Relation Age of Onset    Cancer Father         Skin (melanoma)    Mult Sclerosis Mother     No Known Problems Brother         Review of Systems  Constitutional: negative for fevers, chills, sweats    Ears, nose, mouth, throat, and face: negative for nasal congestion and sore throat   Respiratory: negative for cough and shortness of breath   Cardiovascular: negative for chest pain and dyspnea   Gastrointestinal: see hpi Genitourinary:negative for dysuria and frequency   Integument/breast: negative for pruritus and rash   Hematologic/lymphatic: negative for bleeding and easy bruising   Musculoskeletal:negative for arthralgias and myalgias   Neurological: negative for dizziness and weakness         Physical Exam  Blood pressure 109/75, pulse 85, temperature 96.5 °F (35.8 °C), temperature source Oral, resp. rate 11, height 5' 11\" (1.803 m), weight 170 lb (77.1 kg), SpO2 97 %. General appearance: alert, cooperative, no distress, appears stated age  Eyes: Anicteric  Head: Normocephalic, without obvious abnormality  Lungs: clear to auscultation bilaterally, Normal Effort  Heart: regular rate and rhythm, normal S1 and S2, no murmurs or rubs  Abdomen: distended, non-tender. Bowel sounds hypoactive. No masses,  no organomegaly. Extremities: atraumatic, no cyanosis or edema  Skin: warm and dry, no jaundice  Neuro: Grossly intact, A&OX3  Musculoskeletal: 5/5  strength BUE      Data Review:    Recent Labs     11/29/21 2205 11/30/21  0757   WBC 6.2 4.6   HGB 10.9* 10.3*   HCT 33.1* 31.4*   MCV 98.8 99.1    154     Recent Labs     11/29/21 2205   *   K 3.6   CL 96*   CO2 32   BUN 10   CREATININE 0.7*     Recent Labs     11/29/21 2205   AST 65*   ALT 28   BILITOT 1.1*   ALKPHOS 248*     Recent Labs     11/29/21 2205   LIPASE 10.0*     No results for input(s): PROTIME, INR in the last 72 hours. No results for input(s): PTT in the last 72 hours. No results for input(s): OCCULTBLD in the last 72 hours. Imaging Studies:                             CT-scan of abdomen and pelvis w iv contrast 11/29/21:  Impression   1. Dilation of small bowel to the level of the ileocolic anastomosis with   small bowel wall thickening could represent small bowel obstruction or ileus. 2. Large volume of ascites. 3. Right lower lobe mass is partially obscured by right lower lobe   atelectasis.    4. No significant change in the size of the liver lesions.                        Assessment:     Active Problems:    Ileus (HCC)    Colonic mass    Small bowel obstruction (HCC)  Resolved Problems:    * No resolved hospital problems. *    Small bowel obstruction -CT with dilation of small bowel to the ileocolonic anastomosis with small bowel thickening concerning for small bowel obstruction. Surgery following. Metastatic colon cancer -CT with a large volume ascites. Patient reports ascites earlier this year but resolved with chemo and did not need paracentesis. Recommendations:   -Small bowel obstruction per surgery team  - once SBO resolved, could consider colonoscopy to see if anastomotic dilation needed. Discussed with Dr. Garett Flanagan, [facility]  [id number]    76-year-old  male with history of metastatic ascending colon cancer status post right hemicolectomy in March 2018. He underwent chemotherapy in 2017 and 2018. He later developed worsening metastatic disease and started back on chemo in the spring 2021. He has stopped chemotherapy because of persistently low potassium. He has been having intermittent nausea and vomiting 6 to 8 hours after eating. He finally went to the ER and was found to have small bowel obstruction at the ileocolonic anastomosis. Vital signs are stable. Abdomen is distended, diffusely tender, positive for fluid wave, consistent with ascites, hypoactive bowel sounds, + abdominal scar  A/P> small bowel obstruction at the level of ileocolonic anastomosis, this could be from benign anastomotic stricture or recurrent malignancy. Malignant ascites. IV fluids, n.p.o., NG tube for bowel decompression, surgery on board. Once recovered from small bowel obstruction, we will be available for colonoscopy +/- dilation. GI will sign off for now. Please call if you have any questions.     Octaviano Strong MD, MSc  [facility]el[facility] Coffey

## 2021-11-30 NOTE — PROGRESS NOTES
[facility] PROGRESS NOTE    11/30/2021 3:02 PM        Name: Patricia Moseley . Admitted: 11/29/2021  Primary Care Provider: Mariposa Arredondo MD (Tel: [phone])      Subjective:  . Patient seen this am. His NG tube accidentally got pulled out around 3 am per patient. He denies any abdominal pain. Did have small incontinent loose stool. No nausea or vomiting. Reviewed interval ancillary notes    Current Medications  dicyclomine (BENTYL) capsule 10 mg, TID PRN  sodium chloride flush 0.9 % injection 5-40 mL, 2 times per day  sodium chloride flush 0.9 % injection 10 mL, PRN  0.9 % sodium chloride infusion, PRN  enoxaparin (LOVENOX) injection 40 mg, Nightly  ondansetron (ZOFRAN-ODT) disintegrating tablet 4 mg, Q8H PRN   Or  ondansetron (ZOFRAN) injection 4 mg, Q6H PRN  polyethylene glycol (GLYCOLAX) packet 17 g, Daily PRN  acetaminophen (TYLENOL) tablet 650 mg, Q6H PRN   Or  acetaminophen (TYLENOL) suppository 650 mg, Q6H PRN  lidocaine viscous hcl (XYLOCAINE) 2 % solution 15 mL, Q3H PRN  0.9 % sodium chloride infusion, Continuous        Objective:  /75   Pulse 91   Temp 96.9 °F (36.1 °C) (Axillary)   Resp 10   Ht 5' 11\" (1.803 m)   Wt 170 lb (77.1 kg)   SpO2 99%   BMI 23.71 kg/m²   No intake or output data in the 24 hours ending 11/30/21 1502   Wt Readings from Last 3 Encounters:   11/29/21 170 lb (77.1 kg)   10/13/21 175 lb 3.2 oz (79.5 kg)   04/08/21 180 lb (81.6 kg)       General appearance:  Appears comfortable  Eyes: Sclera clear. Pupils equal.  ENT: Moist oral mucosa. Trachea midline, no adenopathy. Cardiovascular: Regular rhythm, normal S1, S2. No murmur. No edema in lower extremities  Respiratory: Not using accessory muscles. Good inspiratory effort. Clear to auscultation bilaterally, no wheeze or crackles.    GI: Abdomen slightly distended and firm, no tenderness, normal bowel sounds  Musculoskeletal: No cyanosis in digits, neck supple  Neurology: CN 2-12 grossly intact. No speech or motor deficits  Psych: Normal affect. Alert and oriented in time, place and person  Skin: Warm, dry, normal turgor    Labs and Tests:  CBC:   Recent Labs     11/29/21 2205 11/30/21  0757   WBC 6.2 4.6   HGB 10.9* 10.3*    154     BMP:    Recent Labs     11/29/21 2205 11/30/21  0757   * 138   K 3.6 3.1*   CL 96* 99   CO2 32 32   BUN 10 9   CREATININE 0.7* 0.7*   GLUCOSE 104* 91     Hepatic:   Recent Labs     11/29/21 2205 11/30/21  0757   AST 65* 39*   ALT 28 24   BILITOT 1.1* 1.1*   ALKPHOS 248* 225*     CT abd/pelvis  1. Dilation of small bowel to the level of the ileocolic anastomosis with   small bowel wall thickening could represent small bowel obstruction or ileus. 2. Large volume of ascites. 3. Right lower lobe mass is partially obscured by right lower lobe   atelectasis. 4. No significant change in the size of the liver lesions. Problem List  Active Problems:    Ileus ([address]    Colonic mass    SBO (small bowel obstruction) ([address]  Resolved Problems:    * No resolved hospital problems. *       Assessment & Plan:   1. Small bowel obstruction-patient did pass some stool and flatus. Discussed with surgery. NG tube to be replaced. Plan for SBFT tomorrow. Continue iv fluids. 2. Large volume ascites-had not required paracentesis in the past. Has known metastatic colon Ca. If abdomen remains distended despite resolution of SBFT may need paracentesis. 3. Metastatic Colon CA-will get oncology on board.   4. Hypotension-increase fluids to 125 ml/hr      Diet: Diet NPO Exceptions are: Sips of Water with Meds  Code:Full Code  DVT: justina Hahn PA-C   11/30/2021 3:02 PM

## 2021-11-30 NOTE — PROGRESS NOTES
Eugene Khan [place] [employer]     11/30/21 9:34 AM   [phone] Hospital or Facility: [facility] From: Farrah Hernandez RE: Joseph Charles 1960 RM: 15 Pt would like a numbing spray to his throat prior to NG placement Need Callback: NO CALLBACK REQ Need Callback: NO CALLBACK Robert ALEJANDRA  Unread

## 2021-11-30 NOTE — PLAN OF CARE
[place] [id number] [facility]ar[facility]        Assessment & Plan of Care    History of Present Illness: Mr. Woody Harrison is a 57 y.o. male who presents to ED after being referred by his oncologist after AXR showed findings suggestive of small-bowel obstruction which could be secondary to right colon mass. States he has been having postprandial nausea and non-bloody vomiting about 6 hours after meals for the last 2 months (around when his chemo was stopped) as well as abdominal pain. States the pain is mild and generalized throughout the abdomen and \"moves around\", describing the pain as an \"overstuffed stomach feeling\". Pain slightly better after vomiting. Endorses non-bloody diarrhea, bloating, and inability to eat due to mouth sores from chemotherapy, however, feels hungry. Last bowel movement was this morning. Denies constipation, fevers, chills, jaundice, chest pain, shortness of breath, melena/hematochezia, or any urinary symptoms. Past medical history includes metastatic colon cancer (dx 5/2017) with metastasis to liver and lung, and hypertension. Prior abdominal surgeries include right colectomy and partial liver resection. No blood thinners. Nonsmoker.     Physical Exam:  CONSTITUTIONAL:  alert, no apparent distress and normal weight  NEUROLOGIC:  Mental Status Exam:  Level of Alertness:   awake  Orientation:   person, place, time  EYES:  sclera clear  ENT:  normocepalic, without obvious abnormality  NECK:  supple, symmetrical, trachea midline  LUNGS:  clear to auscultation  CARDIOVASCULAR:  regular rate and rhythm and no murmur noted  ABDOMEN: soft, non-distended, generalized abdominal tenderness, voluntary guarding absent, no masses palpated, normal bowel sounds, prior surgical scars noted, and hernia absent  Extremities: no edema  SKIN: generalized pale skin, no bruising or bleeding, no redness, warmth, or swelling and no jaundice    Assessment:  Small bowel obstruction  Colon cancer with liver and lung metastasis  Hypertension    Plan:  1. Place NGT, continue bowel decompression today; no plans for surgical intervention at this time as patient is passing frequent stools; plan for small bowel follow through tomorrow   2. NPO; monitor bowel function--passing stool   3. IV hydration; monitor and correct electrolytes  5. Activity as tolerated, ambulate TID, up to chair for all meals  6. Pulmonary toilet, incentive spirometry  7. PRN analgesics and antiemetics--minimizing narcotics as tolerated  8. DVT prophylaxis with SCD's  9. Management of medical comorbid etiologies per primary team and consulting services    EDUCATION:  Educated patient on plan of care and disease process--all questions answered. Plans discussed with patient and nursing. Reviewed and discussed with Dr. Chiquita Dominguez consult to follow      Signed:  Ephraimnora Campbell [facility] NP Student)   11/30/2021 11:05 AM         I have personally performed a face-to-face diagnostic evaluation on this patient. I have interviewed and examined the patient and I agree with the assessment above.     Signed:  VIJAY Shetty CNP  11/30/2021 2:08 PM

## 2021-11-30 NOTE — ED PROVIDER NOTES
[facility]        Pt Name: Ela Duff  MRN: [id number]  [place] 1960  Date of evaluation: 11/29/2021  Provider: Jesus Granados PA-C  PCP: Karlene Vallejo MD  Note Started: 9:58 PM EST       JERZY. I have evaluated this patient. My supervising physician was available for consultation. CHIEF COMPLAINT       Chief Complaint   Patient presents with    Other     Patient states he was at the doctor earlier today and they found a small bowel obstruction and was told to come to ER because they were concerned about it. HISTORY OF PRESENT ILLNESS   (Location, Timing/Onset, Context/Setting, Quality, Duration, Modifying Factors, Severity, Associated Signs and Symptoms)  Note limiting factors. Chief Complaint: SBO     Ela Duff is a 57 y.o. male who presents for evaluation of small bowel obstruction. Patient has known colon cancer and is receiving treatment for this. He has had partial colectomy in the past.  Patient reports postprandial nausea and vomiting for the past several weeks with associated abdominal pain. States that he had x-ray at facility confirming small bowel obstruction. Patient states that he is still having bowel movements but they are straight liquid. He denies fevers or chills. No chest pain shortness of breath. No history of bowel obstructions. He has no other complaints or concerns at this time. Nursing Notes were all reviewed and agreed with or any disagreements were addressed in the HPI. REVIEW OF SYSTEMS    (2-9 systems for level 4, 10 or more for level 5)     Review of Systems   Constitutional: Negative for appetite change, chills and fever. HENT: Negative for congestion and rhinorrhea. Respiratory: Negative for cough, shortness of breath and wheezing. Cardiovascular: Negative for chest pain. Gastrointestinal: Positive for abdominal pain, nausea and vomiting.  Negative for diarrhea. Genitourinary: Negative for difficulty urinating, dysuria and hematuria. Musculoskeletal: Negative for neck pain and neck stiffness. Skin: Negative for rash. Neurological: Negative for headaches. Positives and Pertinent negatives as per HPI. Except as noted above in the ROS, all other systems were reviewed and negative. PAST MEDICAL HISTORY     Past Medical History:   Diagnosis Date    Anemia     Colon cancer ([address]     LAST CHEMO 2/5/18    Hypertension     Lung nodule     metastatic          SURGICAL HISTORY     Past Surgical History:   Procedure Laterality Date    COLONOSCOPY      CYST REMOVAL  1973    Behind right knee    LIVER BIOPSY Right 10/05/2017    [facility] specials as outpt    OTHER SURGICAL HISTORY  03/19/2018     Robotic assisted converted to open right colectomy with anastomosis, greater omental vascularized pedicled flap creation    THORACOSCOPY Right 04/16/2018    RIGHT VIDEO ASSISTED THORACOSCOPY WITH WEDGE EXCISION RIGHT    TUNNELED VENOUS PORT PLACEMENT Left 05/30/2017    UPPER GASTROINTESTINAL ENDOSCOPY      WISDOM TOOTH EXTRACTION           CURRENTMEDICATIONS       Previous Medications    DICYCLOMINE (BENTYL) 10 MG CAPSULE    Take 1 capsule by mouth 3 times daily as needed (cramping)    DIPHENOXYLATE-ATROPINE (LOMOTIL) 2.5-0.025 MG PER TABLET    Take 2.5 mg by mouth. LIDOCAINE-PRILOCAINE (EMLA) 2.5-2.5 % CREAM    Apply a thick layer over the port 30 minutes prior to treatment and cover with saran wrap. LORAZEPAM (ATIVAN) 1 MG TABLET        MAGIC MOUTHWASH (MIRACLE MOUTHWASH)    Swish and spit 5 mLs 4 times daily as needed for Irritation    POTASSIUM CHLORIDE (KLOR-CON M) 20 MEQ EXTENDED RELEASE TABLET    Take 1 tablet by mouth daily for 2 days    SODIUM CHLORIDE 0.9 % SOLN WITH FLUOROURACIL 500 MG/10ML SOLN    Infuse intravenously Over 46 hours Patient pump bag dosage reads: 4750 mg/ mL infusion at 5mL/hr.          ALLERGIES Potassium-containing compounds    FAMILYHISTORY       Family History   Problem Relation Age of Onset    Cancer Father         Skin (melanoma)    Mult Sclerosis Mother     No Known Problems Brother           SOCIAL HISTORY       Social History     Tobacco Use    Smoking status: Never Smoker    Smokeless tobacco: Never Used   Substance Use Topics    Alcohol use: Yes     Alcohol/week: 2.0 standard drinks     Types: 2 Cans of beer per week     Comment: daily     Drug use: No       SCREENINGS    Adis Coma Scale  Eye Opening: Spontaneous  Best Verbal Response: Oriented  Best Motor Response: Obeys commands  Jarratt Coma Scale Score: 15        PHYSICAL EXAM    (up to 7 for level 4, 8 or more for level 5)     ED Triage Vitals [11/29/21 2134]   BP Temp Temp src Pulse Resp SpO2 Height Weight   (!) 132/97 -- -- 99 20 99 % 5' 11\" (1.803 m) 170 lb (77.1 kg)       Physical Exam  Vitals and nursing note reviewed. Constitutional:       Appearance: He is well-developed. He is not diaphoretic. HENT:      Head: Normocephalic and atraumatic. Right Ear: External ear normal.      Left Ear: External ear normal.      Nose: Nose normal.   Eyes:      General:         Right eye: No discharge. Left eye: No discharge. Cardiovascular:      Rate and Rhythm: Normal rate and regular rhythm. Heart sounds: Normal heart sounds. Pulmonary:      Effort: Pulmonary effort is normal. No respiratory distress. Breath sounds: Normal breath sounds. Chest:      Chest wall: No tenderness. Abdominal:      General: There is no distension. Palpations: Abdomen is soft. Tenderness: There is abdominal tenderness in the left upper quadrant. There is no guarding or rebound. Musculoskeletal:         General: Normal range of motion. Cervical back: Normal range of motion and neck supple. Skin:     General: Skin is warm and dry.    Neurological:      Mental Status: He is alert and oriented to person, place, and time.   Psychiatric:         Behavior: Behavior normal.         DIAGNOSTIC RESULTS   LABS:    Labs Reviewed   CBC WITH AUTO DIFFERENTIAL - Abnormal; Notable for the following components:       Result Value    RBC 3.36 (*)     Hemoglobin 10.9 (*)     Hematocrit 33.1 (*)     RDW 18.0 (*)     All other components within normal limits    Narrative:     Performed at:  [facility] [address], [place]   Phone [phone]   COMPREHENSIVE METABOLIC PANEL - Abnormal; Notable for the following components:    Sodium 135 (*)     Chloride 96 (*)     Glucose 104 (*)     CREATININE 0.7 (*)     Calcium 7.7 (*)     Total Protein 5.8 (*)     Albumin 2.2 (*)     Albumin/Globulin Ratio 0.6 (*)     Total Bilirubin 1.1 (*)     Alkaline Phosphatase 248 (*)     AST 65 (*)     All other components within normal limits    Narrative:     Performed at:  [facility] [address], [place]   Phone [phone]   LIPASE - Abnormal; Notable for the following components:    Lipase 10.0 (*)     All other components within normal limits    Narrative:     Performed at:  [facility] [address], [address]   Phone [phone]   LACTATE, SEPSIS - Abnormal; Notable for the following components:    Lactic Acid, Sepsis 3.3 (*)     All other components within normal limits    Narrative:     Performed at:  [facility] [address] [facility], [place]   Phone [phone]   URINE [address]       When ordered only abnormal lab results are displayed. All other labs were within normal range or not returned as of this dictation. EKG: When ordered, EKG's are interpreted by the Emergency Department Physician in the absence of a cardiologist.  Please see their note for interpretation of EKG.     RADIOLOGY:   Non-plain film images such as CT, was given the following medications:  Medications   iopamidol (ISOVUE-370) 76 % injection 75 mL (75 mLs IntraVENous Given 11/29/21 7824)           Patient presents for evaluation of small bowel obstruction. On exam, he is resting comfortably in bed no acute distress and nontoxic. Vitals are stable and he is afebrile. Lungs are clear to auscultation bilaterally. Abdomen does appear slightly distended, mild tenderness to the left upper quadrant. No peritoneal signs. Normoactive bowel sounds. He declined need for any pain medication at this time will be reevaluated. CBC and CMP are relatively unremarkable. Lipase 10. Lactic acid 3.3. This will be repeated after fluid resuscitation. CT shows large volume of ascites as well as dilation of the small bowel at the ileocolic anastomosis small bowel wall thickening concerning for small bowel obstruction. I spoke with general surgery who recommends NG tube and hospitalist admission will consult in the morning. Hospitalist resume care the patient at this time. Patient was informed and agreeable. He is stable for admission. FINAL IMPRESSION      1. SBO (small bowel obstruction) (HCC)    2. Other ascites    3. History of colon cancer          DISPOSITION/PLAN   DISPOSITION Decision To Admit 11/29/2021 11:33:03 PM      PATIENT REFERRED TO:  No follow-up provider specified.     DISCHARGE MEDICATIONS:  New Prescriptions    No medications on file       DISCONTINUED MEDICATIONS:  Discontinued Medications    No medications on file              (Please note that portions of this note were completed with a voice recognition program.  Efforts were made to edit the dictations but occasionally words are mis-transcribed.)    Jakob Nunes PA-C (electronically signed)           Lemuel Murray PA-C  11/29/21 1668

## 2021-11-30 NOTE — PROGRESS NOTES
Mattie Face NP Perfect served     11/30/21 11:36 AM   NG in place , secured at [address]  Would like an X-ray for confirmation  Unread     Response 11/30/21 11:36 AM   Ok

## 2021-11-30 NOTE — PROGRESS NOTES
In bed at start of shift. C/o headache. NG tube off pt states,' It come off.' Will attempt to place.  R a/c IV 20G infusing with NS at  75 ml/hr     Lab at bedside to collect blood

## 2021-11-30 NOTE — CONSULTS
[address] [facility]     Consult                                                     Patient Name: Kiran Santana  MRN: [id number]  YOB: 1960  Admission date: 11/29/2021  9:23 PM   Date of evaluation: 11/30/2021  Primary Care Physician: Devante Bella MD  Requesting physician: Rose Marie Robbins MD  Reason for consult: Abdominal pain  History of Present Illness:    Mr. Anthony Kaufman is a 57 y.o. male who presents after outpatient abdominal x-ray ordered by his oncologist shows suggestions of small bowel obstruction. The patient has had postprandial nausea and vomiting for the last 2 months corresponding to finishing his chemotherapy. Pain cramping and bloating associated with the nausea. Patient feels better after emesis and nothing makes the pain worse. Does not radiate. Last bowel movement was this morning and patient is having loose stool. Denies fevers chills chest pain dyspnea cough exposure to Covid, is vaccinated, jaundice dysuria or other complaints. Medical conditions include metastatic colon cancer with metastatic disease to liver lung and with ascites that is likely malignant. Surgical history includes right colectomy with subsequent partial liver resection and pulmonary resection of metastatic disease. Imaging concerning for small bowel obstruction possibly at anastomosis.   NG placed and patient initially felt better but inadvertently was removed    Past Medical History:        Diagnosis Date    Anemia     Colon cancer ([address]     LAST CHEMO 2/5/18    Hypertension     Lung nodule     metastatic        Past Surgical History:        Procedure Laterality Date    COLONOSCOPY      CYST REMOVAL  1973    Behind right knee    LIVER BIOPSY Right 10/05/2017    [facility] in specials as outpt    OTHER SURGICAL HISTORY  03/19/2018     Robotic assisted converted to open right colectomy with anastomosis, greater omental vascularized pedicled flap creation    THORACOSCOPY Right 04/16/2018    RIGHT VIDEO ASSISTED THORACOSCOPY WITH WEDGE EXCISION RIGHT    TUNNELED VENOUS PORT PLACEMENT Left 05/30/2017    UPPER GASTROINTESTINAL ENDOSCOPY      WISDOM TOOTH EXTRACTION         Scheduled Meds:   sodium chloride flush  5-40 mL IntraVENous 2 times per day    enoxaparin  40 mg SubCUTAneous Nightly     Continuous Infusions:   sodium chloride      sodium chloride 75 mL/hr at 11/30/21 1322     PRN Meds:.dicyclomine, sodium chloride flush, sodium chloride, ondansetron **OR** ondansetron, polyethylene glycol, acetaminophen **OR** acetaminophen, lidocaine viscous hcl    Prior to Admission medications    Medication Sig Start Date End Date Taking? Authorizing Provider   potassium chloride (KLOR-CON M) 20 MEQ extended release tablet Take 1 tablet by mouth daily for 2 days 10/14/21 11/29/21 Yes Jaron Victor MD   diphenoxylate-atropine (LOMOTIL) 2.5-0.025 MG per tablet Take 2.5 mg by mouth. 12/1/20  Yes Historical Provider, MD   LORazepam (ATIVAN) 1 MG tablet  11/29/21   Historical Provider, MD   sodium chloride 0.9 % SOLN with fluorouracil 500 MG/10ML SOLN Infuse intravenously Over 46 hours Patient pump bag dosage reads: 4750 mg/ mL infusion at 5mL/hr.     Historical Provider, MD   dicyclomine (BENTYL) 10 MG capsule Take 1 capsule by mouth 3 times daily as needed (cramping) 7/8/21 10/15/21  Traci Ruiz MD   Magic Mouthwash (MIRACLE MOUTHWASH) Swish and spit 5 mLs 4 times daily as needed for Irritation    Historical Provider, MD   lidocaine-prilocaine (EMLA) 2.5-2.5 % cream Apply a thick layer over the port 30 minutes prior to treatment and cover with saran wrap. 7/11/17   Mitchel Flores MD        Allergies:  Potassium-containing compounds    Social History:   Social History     Socioeconomic History    Marital status: Single     Spouse name: None    Number of children: None    Years of education: None    Highest education level: None   Occupational History  Occupation:    Tobacco Use    Smoking status: Never Smoker    Smokeless tobacco: Never Used   Substance and Sexual Activity    Alcohol use: Yes     Alcohol/week: 2.0 standard drinks     Types: 2 Cans of beer per week     Comment: daily     Drug use: No    Sexual activity: None   Other Topics Concern    None   Social History Narrative    None     Social Determinants of Health     Financial Resource Strain:     Difficulty of Paying Living Expenses: Not on file   Food Insecurity:     Worried About Running Out of Food in the Last Year: Not on file    Viri of Food in the Last Year: Not on file   Transportation Needs:     Lack of Transportation (Medical): Not on file    Lack of Transportation (Non-Medical):  Not on file   Physical Activity:     Days of Exercise per Week: Not on file    Minutes of Exercise per Session: Not on file   Stress:     Feeling of Stress : Not on file   Social Connections:     Frequency of Communication with Friends and Family: Not on file    Frequency of Social Gatherings with Friends and Family: Not on file    Attends Judaism Services: Not on file    Active Member of [address] [place] [address] or Organizations: Not on file    Attends Club or Organization Meetings: Not on file    Marital Status: Not on file   Intimate Partner Violence:     Fear of Current or Ex-Partner: Not on file    Emotionally Abused: Not on file    Physically Abused: Not on file    Sexually Abused: Not on file   Housing Stability:     Unable to Pay for Housing in the Last Year: Not on file    Number of [place] in the Last Year: Not on file    Unstable Housing in the Last Year: Not on file       Family History:    Family History   Problem Relation Age of Onset    Cancer Father         Skin (melanoma)    Mult Sclerosis Mother     No Known Problems Brother        Review of Systems:  CONSTITUTIONAL:  Negative except as above  HEENT:  negative  RESPIRATORY:  negative  CARDIOVASCULAR: negative  GASTROINTESTINAL:  negative except as above   GENITOURINARY:  negative  HEMATOLOGIC/LYMPHATIC:  negative  NEUROLOGICAL:  Negative      Vital Signs:  Patient Vitals for the past 24 hrs:   BP Temp Temp src Pulse Resp SpO2 Height Weight   21 1235 98/72 -- -- 90 10 97 % -- --   21 0919 99/75 -- -- 82 12 -- -- --   21 0751 109/75 96.5 °F (35.8 °C) Oral 85 11 97 % -- --   21 0600 107/79 -- -- -- -- 97 % -- --   21 0530 103/72 -- -- -- -- 95 % -- --   21 101/ -- -- -- -- 97 % -- --   21 102/ -- -- -- -- 97 % -- --   21 101/ -- -- -- -- 97 % -- --   21 107/ -- -- -- -- 97 % -- --   21 102/ -- -- -- -- 95 % -- --   21 0302 97/72 -- -- -- -- 96 % -- --   21 024 95/74 -- -- -- -- 97 % -- --   21 0243 106/75 -- -- -- -- 97 % -- --   21 -- -- -- 86 16 97 % -- --   21 -- -- -- 87 17 97 % -- --   215 101/76 -- -- -- -- -- -- --   21 -- -- -- 94 18 97 % -- --   21 -- -- -- 90 18 96 % -- --   21 -- -- -- 95 19 93 % -- --   21 100/74 -- -- 98 16 96 % -- --   21 -- 98.7 °F (37.1 °C) -- -- -- -- -- --   21 96/77 -- -- 100 20 97 % -- --   21 -- -- -- 100 16 100 % -- --   21 105/76 -- -- 100 18 98 % -- --   21 107/80 -- -- 100 17 96 % -- --   21 (!) 132/97 -- -- 99 20 99 % 5' 11\" (1.803 m) 170 lb (77.1 kg)      TEMPERATURE HISTORY 24H: Temp (24hrs), Av.6 °F (36.4 °C), Min:96.5 °F (35.8 °C), Max:98.7 °F (37.1 °C)    BLOOD PRESSURE HISTORY: Systolic (12CTE), JUX:622 , Min:95 , LJX:315    Diastolic (89IQR), PBO:84, Min:70, Max:97    Admission Weight: 170 lb (77.1 kg)     No intake or output data in the 24 hours ending 21 1325  Drain/tube Output:         Physical Exam:  Constitutional:  well-developed, well-nourished,   Neurologic: awake, Orientation:  Oriented to person, place, time, follows commands, clear speech, cranial nerves grossly intact  Psychiatric: normal affect, no hallucinations  Eyes:  sclera clear, no visible discharge  Head, ears, nose, mouth, throat: normocepalic, without obvious abnormality, supple, symmetrical, trachea midline, no JVD, mucous membranes moist  Cardiovascular: regular rate and rhythm   Respiratory: clear to auscultation  GI: soft, mild distension, mild mid abdominal tenderness without peritonitis  Lymph: no palpable lymphadenopathy  Skin: no bruising or bleeding, normal skin color, texture, turgor and no redness, warmth, or swelling    Labs:    CBC:    Recent Labs     11/29/21 2205 11/30/21  0757   WBC 6.2 4.6   HGB 10.9* 10.3*   HCT 33.1* 31.4*    154     BMP:    Recent Labs     11/29/21 2205 11/30/21  0757   * 138   K 3.6 3.1*   CL 96* 99   CO2 32 32   BUN 10 9   CREATININE 0.7* 0.7*   GLUCOSE 104* 91     Hepatic:   Recent Labs     11/29/21 2205 11/30/21  0757   AST 65* 39*   ALT 28 24   BILITOT 1.1* 1.1*   ALKPHOS 248* 225*     Amylase: No results for input(s): AMYLASE in the last 72 hours. Lipase:   Recent Labs     11/29/21 2205   LIPASE 10.0*     Mag:      Recent Labs     11/30/21  0757   MG 1.80      Phos:   No results for input(s): PHOS in the last 72 hours. Coags: No results for input(s): INR, APTT in the last 72 hours. Imaging:  I have personally reviewed the following films:  XR ABDOMEN (KUB) (SINGLE AP VIEW)    Result Date: 11/29/2021  EXAMINATION: ONE SUPINE XRAY VIEW(S) OF THE ABDOMEN 11/29/2021 1:45 pm COMPARISON: None. HISTORY: ORDERING SYSTEM PROVIDED HISTORY: Ascending colon malignant neoplasm [facility]) TECHNOLOGIST PROVIDED HISTORY: Reason for Exam: Malignant tumor of ascending colon Acuity: Unknown Type of Exam: Unknown FINDINGS: Supine AP view of the abdomen was obtained on 2 images. There is gaseous distention of multiple small bowel loops. Small bowel loops measure up to 5.0 cm in diameter.   The colon is collapsed. This may be secondary to obstructing mass in the proximal colon given history of right colon malignancy. There is probable splenomegaly. No additional evidence of organomegaly. 1. Findings suggestive of small-bowel obstruction which could be secondary to right colon mass. 2. Probable splenomegaly. CT ABDOMEN PELVIS W IV CONTRAST Additional Contrast? None    Result Date: 11/29/2021  EXAMINATION: CT OF THE ABDOMEN AND PELVIS WITH CONTRAST 11/29/2021 10:40 pm TECHNIQUE: CT of the abdomen and pelvis was performed with the administration of intravenous contrast. Multiplanar reformatted images are provided for review. Dose modulation, iterative reconstruction, and/or weight based adjustment of the mA/kV was utilized to reduce the radiation dose to as low as reasonably achievable. COMPARISON: 10/07/2021 HISTORY: ORDERING SYSTEM PROVIDED HISTORY: sbo TECHNOLOGIST PROVIDED HISTORY: Reason for exam:->sbo Additional Contrast?->None Decision Support Exception - unselect if not a suspected or confirmed emergency medical condition->Emergency Medical Condition (MA) Reason for Exam: SBO. Other (Patient states he was at the doctor earlier today and they found a small bowel obstruction and was told to come to ER because they were concerned about it. ). Acuity: Acute Type of Exam: Initial FINDINGS: Lower Chest: Previously seen right lower lobe mass is obscured by partial right lower lobe collapse. There is also middle lobe atelectasis. There is a small right pleural effusion. Organs: There is fatty infiltration of the liver. The low-attenuation mass in the left hepatic lobe is essentially unchanged at 2.7 x 3.5 cm. Low-attenuation lesions at the dome of the liver are grossly stable. The spleen is enlarged. The pancreas, gallbladder, adrenal glands and kidneys are unremarkable. GI/Bowel: Small bowel is mildly dilated with diffuse small bowel wall thickening.   Small bowel is dilated to the level of the ileocolic anastomosis. The patient is status post right hemicolectomy. Remaining colon is unremarkable. Pelvis: The bladder is grossly negative. Peritoneum/Retroperitoneum: There is a large volume of ascites throughout the abdomen and pelvis with diffuse mesenteric stranding. Partially calcified periportal lymph nodes are unchanged. The aorta is unremarkable. Bones/Soft Tissues: No acute finding or suspicious bone lesion. There is degenerative disc disease at L5-S1.     1. Dilation of small bowel to the level of the ileocolic anastomosis with small bowel wall thickening could represent small bowel obstruction or ileus. 2. Large volume of ascites. 3. Right lower lobe mass is partially obscured by right lower lobe atelectasis. 4. No significant change in the size of the liver lesions. Cultures:  Relevant cultures documented under results section     Assessment:  Patient Active Problem List   Diagnosis    Malignant neoplasm of ascending colon ([address]    Colon cancer metastasized to liver ([address]    Chemotherapy-induced neutropenia (HCC)    Chemotherapy-induced thrombocytopenia    Cancer of ascending colon ([address]    Right lower lobe lung mass    Lung mass    Liver lesion    Positive blood cultures    Metastasis from colon cancer ([address]    Essential hypertension    Alkaline phosphatase elevation    Non-smoker    Severe malnutrition (HCC)    Actinomyces infection    Receiving intravenous antibiotic treatment as outpatient    Acute hypokalemia    Hypokalemia    Ileus ([address]    Colonic mass    Small bowel obstruction (HCC)     Small bowel obstruction  Metastatic colon cancer    Plan:  1. The patient has a small bowel obstruction that is likely from adhesive disease, but also possibly anastomotic stricture or malignant. Currently examis benign without signs of toxicity, does not need emergent/urgent surgical exploration unless condition deteriorates  2.  Will order small bowel follow through tomorrow after decompression to assess obstruction, anastomosis  3. If distension continues after bowel obstruction resolves, possibly before, may need paracentesis  4. NG decompression, NPO  5. Monitor bowel function, passing stool, likely partial obstruction if present  6. IVF, monitor and replace electrolytes as needed  7. Pain medication and antiemetics as needed with caution as may mask worsening exam  8. Defer management of remainder of medical comorbidities to primary and consulting teams    This plan was discussed at length with the patient. He was understanding and in agreement with the plan  Thank you for the consult and allowing me to participate in the care of this patient. I look forward to following him this admission    Eder Swain MD, FACS  11/30/2021  1:25 PM

## 2021-11-30 NOTE — H&P
Hospital Medicine History and Physical    11/29/2021    Date of Admission: 11/29/2021    Date of Service: Pt seen/examined on 11/29/2021 and admitted to inpatient. Assessment/plan:  1. Small bowel obstruction. Likely secondary to colon mass. General surgery has been consulted from the emergency room, recommends admission with plan for further evaluation in the morning. Check stool studies including C. difficile antibacterial pathogen. Check sed rate and CRP. Will consult GI as well. Make strict n.p.o. for now. Continue intravenous fluid overnight. Antiemetics in place. 2. Other comorbidities: history of colon cancer, essential hypertension, metastatic lung mass, macrocytic anemia. Activities: Up with assist  Prophylaxis: Subcutaneous Lovenox  Code status: Full code    ==========================================================  Chief complaint:  Chief Complaint   Patient presents with    Other     Patient states he was at the doctor earlier today and they found a small bowel obstruction and was told to come to ER because they were concerned about it. History of Presenting Illness: This is a pleasant 57 y.o. male with history of colon cancer, essential hypertension, metastatic lung mass, macrocytic anemia, who presents to the emergency room with complaints of intermittent episodes of nausea, vomiting, that typically follows p.o. intake. He also reports having multiple loose stools, totaling about 3, without blood, a day. He does not have hematemesis. He passes gas without difficulties. Last bowel movement was a few hours ago. Symptoms have been ongoing for the past month. He presents to the emergency room today where CT was for colonic obstruction. Patient is being admitted for further evaluation.     Past Medical History:      Diagnosis Date    Anemia     Colon cancer ([address]     LAST CHEMO 2/5/18    Hypertension     Lung nodule     metastatic        Past Surgical History: Procedure Laterality Date    COLONOSCOPY      CYST REMOVAL  1973    Behind right knee    LIVER BIOPSY Right 10/05/2017    phalen MD at [facility] in specials as outpt    OTHER SURGICAL HISTORY  03/19/2018     Robotic assisted converted to open right colectomy with anastomosis, greater omental vascularized pedicled flap creation    THORACOSCOPY Right 04/16/2018    RIGHT VIDEO ASSISTED THORACOSCOPY WITH WEDGE EXCISION RIGHT    TUNNELED VENOUS PORT PLACEMENT Left 05/30/2017    UPPER GASTROINTESTINAL ENDOSCOPY      WISDOM TOOTH EXTRACTION         Medications (prior to admission):  Prior to Admission medications    Medication Sig Start Date End Date Taking? Authorizing Provider   potassium chloride (KLOR-CON M) 20 MEQ extended release tablet Take 1 tablet by mouth daily for 2 days 10/14/21 11/29/21 Yes Jaron Victor MD   diphenoxylate-atropine (LOMOTIL) 2.5-0.025 MG per tablet Take 2.5 mg by mouth. 12/1/20  Yes Historical Provider, MD   LORazepam (ATIVAN) 1 MG tablet  11/29/21   Historical Provider, MD   sodium chloride 0.9 % SOLN with fluorouracil 500 MG/10ML SOLN Infuse intravenously Over 46 hours Patient pump bag dosage reads: 4750 mg/ mL infusion at 5mL/hr. Historical Provider, MD   dicyclomine (BENTYL) 10 MG capsule Take 1 capsule by mouth 3 times daily as needed (cramping) 7/8/21 10/15/21  Traci Ruiz MD   Magic Mouthwash (MIRACLE MOUTHWASH) Swish and spit 5 mLs 4 times daily as needed for Irritation    Historical Provider, MD   lidocaine-prilocaine (EMLA) 2.5-2.5 % cream Apply a thick layer over the port 30 minutes prior to treatment and cover with saran wrap. 7/11/17   Mitchel Flores MD       Allergy(ies):  Potassium-containing compounds    Social History:  TOBACCO:  reports that he has never smoked. He has never used smokeless tobacco.  ETOH:  reports current alcohol use of about 2.0 standard drinks of alcohol per week.     Family History:      Problem Relation Age of Onset    Cancer Father         Skin (melanoma)    Mult Sclerosis Mother     No Known Problems Brother        Review of Systems:  Pertinent positives are listed in HPI. At least 10-point ROS reviewed and were negative. Vitals and physical examination:  /74   Pulse 90   Temp 98.7 °F (37.1 °C)   Resp 18   Ht 5' 11\" (1.803 m)   Wt 170 lb (77.1 kg)   SpO2 96%   BMI 23.71 kg/m²   Gen/overall appearance: Not in acute distress. Alert. Oriented x3. Head: Normocephalic, atraumatic  Eyes: EOMI, good acuity  ENT: Oral mucosa moist  Neck: No JVD, thyromegaly  CVS: Nml S1S2, no MRG, RRR  Pulm: Clear bilaterally. No crackles/wheezes  Gastrointestinal: Slightly distended but soft. No tenderness. No rebound or guarding. Bowel sounds present. Musculoskeletal: Bilateral lower extremity edema. Warm  Neuro: No focal deficit. Moves extremity spontaneously. Psychiatry: Appropriate affect. Not agitated. Skin: Warm, dry with normal turgor. No rash  Capillary refill: Brisk,< 3 seconds   Peripheral Pulses: +2 palpable, equal bilaterally       Labs/imaging/EKG:  CBC:   Recent Labs     11/29/21 2205   WBC 6.2   HGB 10.9*        BMP:    Recent Labs     11/29/21 2205   *   K 3.6   CL 96*   CO2 32   BUN 10   CREATININE 0.7*   GLUCOSE 104*     Hepatic:   Recent Labs     11/29/21  2205   AST 65*   ALT 28   BILITOT 1.1*   ALKPHOS 248*       XR ABDOMEN (KUB) (SINGLE AP VIEW)    Result Date: 11/29/2021  EXAMINATION: ONE SUPINE XRAY VIEW(S) OF THE ABDOMEN 11/29/2021 1:45 pm COMPARISON: None. HISTORY: ORDERING SYSTEM PROVIDED HISTORY: Ascending colon malignant neoplasm [facility]) TECHNOLOGIST PROVIDED HISTORY: Reason for Exam: Malignant tumor of ascending colon Acuity: Unknown Type of Exam: Unknown FINDINGS: Supine AP view of the abdomen was obtained on 2 images. There is gaseous distention of multiple small bowel loops. Small bowel loops measure up to 5.0 cm in diameter. The colon is collapsed.   This may be secondary to obstructing mass in the proximal colon given history of right colon malignancy. There is probable splenomegaly. No additional evidence of organomegaly. 1. Findings suggestive of small-bowel obstruction which could be secondary to right colon mass. 2. Probable splenomegaly. CT ABDOMEN PELVIS W IV CONTRAST Additional Contrast? None    Result Date: 11/29/2021  EXAMINATION: CT OF THE ABDOMEN AND PELVIS WITH CONTRAST 11/29/2021 10:40 pm TECHNIQUE: CT of the abdomen and pelvis was performed with the administration of intravenous contrast. Multiplanar reformatted images are provided for review. Dose modulation, iterative reconstruction, and/or weight based adjustment of the mA/kV was utilized to reduce the radiation dose to as low as reasonably achievable. COMPARISON: 10/07/2021 HISTORY: ORDERING SYSTEM PROVIDED HISTORY: sbo TECHNOLOGIST PROVIDED HISTORY: Reason for exam:->sbo Additional Contrast?->None Decision Support Exception - unselect if not a suspected or confirmed emergency medical condition->Emergency Medical Condition (MA) Reason for Exam: SBO. Other (Patient states he was at the doctor earlier today and they found a small bowel obstruction and was told to come to ER because they were concerned about it. ). Acuity: Acute Type of Exam: Initial FINDINGS: Lower Chest: Previously seen right lower lobe mass is obscured by partial right lower lobe collapse. There is also middle lobe atelectasis. There is a small right pleural effusion. Organs: There is fatty infiltration of the liver. The low-attenuation mass in the left hepatic lobe is essentially unchanged at 2.7 x 3.5 cm. Low-attenuation lesions at the dome of the liver are grossly stable. The spleen is enlarged. The pancreas, gallbladder, adrenal glands and kidneys are unremarkable. GI/Bowel: Small bowel is mildly dilated with diffuse small bowel wall thickening. Small bowel is dilated to the level of the ileocolic anastomosis.   The patient is status post right hemicolectomy. Remaining colon is unremarkable. Pelvis: The bladder is grossly negative. Peritoneum/Retroperitoneum: There is a large volume of ascites throughout the abdomen and pelvis with diffuse mesenteric stranding. Partially calcified periportal lymph nodes are unchanged. The aorta is unremarkable. Bones/Soft Tissues: No acute finding or suspicious bone lesion. There is degenerative disc disease at L5-S1.     1. Dilation of small bowel to the level of the ileocolic anastomosis with small bowel wall thickening could represent small bowel obstruction or ileus. 2. Large volume of ascites. 3. Right lower lobe mass is partially obscured by right lower lobe atelectasis. 4. No significant change in the size of the liver lesions. Discussed with ER PA.       Thank you Yolanda Roque MD for the opportunity to be involved in this patient's care.    -----------------------------  Joe Mariee MD  [facility]

## 2021-12-01 ENCOUNTER — APPOINTMENT (OUTPATIENT)
Dept: GENERAL RADIOLOGY | Age: 61
DRG: 388 | End: 2021-12-01
Payer: COMMERCIAL

## 2021-12-01 LAB
A/G RATIO: 0.6 (ref 1.1–2.2)
ALBUMIN SERPL-MCNC: 1.8 G/DL (ref 3.4–5)
ALP BLD-CCNC: 184 U/L (ref 40–129)
ALT SERPL-CCNC: 19 U/L (ref 10–40)
ANION GAP SERPL CALCULATED.3IONS-SCNC: 8 MMOL/L (ref 3–16)
APTT: 40.9 SEC (ref 26.2–38.6)
AST SERPL-CCNC: 29 U/L (ref 15–37)
BASOPHILS ABSOLUTE: 0.1 K/UL (ref 0–0.2)
BASOPHILS RELATIVE PERCENT: 1.5 %
BILIRUB SERPL-MCNC: 1.2 MG/DL (ref 0–1)
BUN BLDV-MCNC: 10 MG/DL (ref 7–20)
CALCIUM SERPL-MCNC: 7.7 MG/DL (ref 8.3–10.6)
CEA: 3.9 NG/ML (ref 0–5)
CHLORIDE BLD-SCNC: 102 MMOL/L (ref 99–110)
CO2: 31 MMOL/L (ref 21–32)
CREAT SERPL-MCNC: 0.7 MG/DL (ref 0.8–1.3)
EOSINOPHILS ABSOLUTE: 0 K/UL (ref 0–0.6)
EOSINOPHILS RELATIVE PERCENT: 1.1 %
GFR AFRICAN AMERICAN: >60
GFR NON-AFRICAN AMERICAN: >60
GI BACTERIAL PATHOGENS BY PCR: NORMAL
GLUCOSE BLD-MCNC: 68 MG/DL (ref 70–99)
HCT VFR BLD CALC: 29.8 % (ref 40.5–52.5)
HEMOGLOBIN: 9.8 G/DL (ref 13.5–17.5)
INR BLD: 1.38 (ref 0.88–1.12)
LACTIC ACID: 0.7 MMOL/L (ref 0.4–2)
LYMPHOCYTES ABSOLUTE: 0.7 K/UL (ref 1–5.1)
LYMPHOCYTES RELATIVE PERCENT: 19.5 %
MAGNESIUM: 1.9 MG/DL (ref 1.8–2.4)
MCH RBC QN AUTO: 32.9 PG (ref 26–34)
MCHC RBC AUTO-ENTMCNC: 33 G/DL (ref 31–36)
MCV RBC AUTO: 99.9 FL (ref 80–100)
MONOCYTES ABSOLUTE: 0.4 K/UL (ref 0–1.3)
MONOCYTES RELATIVE PERCENT: 12.6 %
NEUTROPHILS ABSOLUTE: 2.3 K/UL (ref 1.7–7.7)
NEUTROPHILS RELATIVE PERCENT: 65.3 %
PDW BLD-RTO: 18.1 % (ref 12.4–15.4)
PHOSPHORUS: 2.7 MG/DL (ref 2.5–4.9)
PLATELET # BLD: 125 K/UL (ref 135–450)
PMV BLD AUTO: 8 FL (ref 5–10.5)
POTASSIUM SERPL-SCNC: 3.9 MMOL/L (ref 3.5–5.1)
PREALBUMIN: 3.9 MG/DL (ref 20–40)
PROTHROMBIN TIME: 15.8 SEC (ref 9.9–12.7)
RBC # BLD: 2.99 M/UL (ref 4.2–5.9)
SODIUM BLD-SCNC: 141 MMOL/L (ref 136–145)
TOTAL PROTEIN: 5 G/DL (ref 6.4–8.2)
TRANSFERRIN: 53 MG/DL (ref 200–360)
WBC # BLD: 3.5 K/UL (ref 4–11)

## 2021-12-01 PROCEDURE — 84100 ASSAY OF PHOSPHORUS: CPT

## 2021-12-01 PROCEDURE — 83735 ASSAY OF MAGNESIUM: CPT

## 2021-12-01 PROCEDURE — 2580000003 HC RX 258: Performed by: INTERNAL MEDICINE

## 2021-12-01 PROCEDURE — 6360000004 HC RX CONTRAST MEDICATION

## 2021-12-01 PROCEDURE — APPSS15 APP SPLIT SHARED TIME 0-15 MINUTES: Performed by: NURSE PRACTITIONER

## 2021-12-01 PROCEDURE — 85730 THROMBOPLASTIN TIME PARTIAL: CPT

## 2021-12-01 PROCEDURE — 82378 CARCINOEMBRYONIC ANTIGEN: CPT

## 2021-12-01 PROCEDURE — 36415 COLL VENOUS BLD VENIPUNCTURE: CPT

## 2021-12-01 PROCEDURE — 6360000002 HC RX W HCPCS: Performed by: NURSE PRACTITIONER

## 2021-12-01 PROCEDURE — 2500000003 HC RX 250 WO HCPCS: Performed by: SURGERY

## 2021-12-01 PROCEDURE — 84134 ASSAY OF PREALBUMIN: CPT

## 2021-12-01 PROCEDURE — 85610 PROTHROMBIN TIME: CPT

## 2021-12-01 PROCEDURE — 80053 COMPREHEN METABOLIC PANEL: CPT

## 2021-12-01 PROCEDURE — 6360000002 HC RX W HCPCS: Performed by: SURGERY

## 2021-12-01 PROCEDURE — 6360000002 HC RX W HCPCS: Performed by: INTERNAL MEDICINE

## 2021-12-01 PROCEDURE — 6370000000 HC RX 637 (ALT 250 FOR IP): Performed by: PHYSICIAN ASSISTANT

## 2021-12-01 PROCEDURE — C9113 INJ PANTOPRAZOLE SODIUM, VIA: HCPCS | Performed by: NURSE PRACTITIONER

## 2021-12-01 PROCEDURE — 74250 X-RAY XM SM INT 1CNTRST STD: CPT

## 2021-12-01 PROCEDURE — 99232 SBSQ HOSP IP/OBS MODERATE 35: CPT | Performed by: SURGERY

## 2021-12-01 PROCEDURE — 1200000000 HC SEMI PRIVATE

## 2021-12-01 PROCEDURE — 2580000003 HC RX 258: Performed by: PHYSICIAN ASSISTANT

## 2021-12-01 PROCEDURE — 83605 ASSAY OF LACTIC ACID: CPT

## 2021-12-01 PROCEDURE — 84466 ASSAY OF TRANSFERRIN: CPT

## 2021-12-01 PROCEDURE — 85025 COMPLETE CBC W/AUTO DIFF WBC: CPT

## 2021-12-01 PROCEDURE — APPNB30 APP NON BILLABLE TIME 0-30 MINS: Performed by: NURSE PRACTITIONER

## 2021-12-01 RX ORDER — SODIUM CHLORIDE 9 MG/ML
INJECTION, SOLUTION INTRAVENOUS CONTINUOUS
Status: ACTIVE | OUTPATIENT
Start: 2021-12-01 | End: 2021-12-02

## 2021-12-01 RX ORDER — CIPROFLOXACIN 2 MG/ML
400 INJECTION, SOLUTION INTRAVENOUS EVERY 12 HOURS
Status: DISCONTINUED | OUTPATIENT
Start: 2021-12-01 | End: 2021-12-03 | Stop reason: HOSPADM

## 2021-12-01 RX ORDER — PANTOPRAZOLE SODIUM 40 MG/10ML
40 INJECTION, POWDER, LYOPHILIZED, FOR SOLUTION INTRAVENOUS DAILY
Status: DISCONTINUED | OUTPATIENT
Start: 2021-12-01 | End: 2021-12-03 | Stop reason: HOSPADM

## 2021-12-01 RX ORDER — LANOLIN ALCOHOL/MO/W.PET/CERES
3 CREAM (GRAM) TOPICAL NIGHTLY PRN
Status: DISCONTINUED | OUTPATIENT
Start: 2021-12-01 | End: 2021-12-03 | Stop reason: HOSPADM

## 2021-12-01 RX ADMIN — IOHEXOL 300 ML: 350 INJECTION, SOLUTION INTRAVENOUS at 14:24

## 2021-12-01 RX ADMIN — MELATONIN TAB 3 MG 3 MG: 3 TAB at 00:21

## 2021-12-01 RX ADMIN — SODIUM CHLORIDE, PRESERVATIVE FREE 10 ML: 5 INJECTION INTRAVENOUS at 09:25

## 2021-12-01 RX ADMIN — PANTOPRAZOLE SODIUM 40 MG: 40 INJECTION, POWDER, FOR SOLUTION INTRAVENOUS at 13:25

## 2021-12-01 RX ADMIN — SODIUM CHLORIDE, PRESERVATIVE FREE 10 ML: 5 INJECTION INTRAVENOUS at 21:29

## 2021-12-01 RX ADMIN — METRONIDAZOLE 500 MG: 500 INJECTION, SOLUTION INTRAVENOUS at 15:12

## 2021-12-01 RX ADMIN — ENOXAPARIN SODIUM 40 MG: 100 INJECTION SUBCUTANEOUS at 21:28

## 2021-12-01 RX ADMIN — SODIUM CHLORIDE: 9 INJECTION, SOLUTION INTRAVENOUS at 13:26

## 2021-12-01 RX ADMIN — METRONIDAZOLE 500 MG: 500 INJECTION, SOLUTION INTRAVENOUS at 23:11

## 2021-12-01 RX ADMIN — CIPROFLOXACIN 400 MG: 2 INJECTION, SOLUTION INTRAVENOUS at 17:36

## 2021-12-01 RX ADMIN — SODIUM CHLORIDE, PRESERVATIVE FREE 10 ML: 5 INJECTION INTRAVENOUS at 13:25

## 2021-12-01 ASSESSMENT — PAIN SCALES - GENERAL
PAINLEVEL_OUTOF10: 0

## 2021-12-01 NOTE — PROGRESS NOTES
[facility]    12/1/2021 1:10 PM        Name: Natalia Hwang . Admitted: 11/29/2021  Primary Care Provider: Sayda Wang MD (Tel: [phone])      Subjective:  . Patient feeling fine. No abdominal pain. Passing flatus. Had 2 BM today. Reviewed interval ancillary notes    Current Medications  melatonin tablet 3 mg, Nightly PRN  iohexol (OMNIPAQUE 350) 350 MG/ML solution,   pantoprazole (PROTONIX) injection 40 mg, Daily  0.9 % sodium chloride infusion, Continuous  dicyclomine (BENTYL) capsule 10 mg, TID PRN  sodium chloride flush 0.9 % injection 5-40 mL, 2 times per day  sodium chloride flush 0.9 % injection 10 mL, PRN  0.9 % sodium chloride infusion, PRN  enoxaparin (LOVENOX) injection 40 mg, Nightly  ondansetron (ZOFRAN-ODT) disintegrating tablet 4 mg, Q8H PRN   Or  ondansetron (ZOFRAN) injection 4 mg, Q6H PRN  polyethylene glycol (GLYCOLAX) packet 17 g, Daily PRN  acetaminophen (TYLENOL) tablet 650 mg, Q6H PRN   Or  acetaminophen (TYLENOL) suppository 650 mg, Q6H PRN  lidocaine viscous hcl (XYLOCAINE) 2 % solution 15 mL, Q3H PRN  potassium chloride (KLOR-CON M) extended release tablet 40 mEq, PRN   Or  potassium bicarb-citric acid (EFFER-K) effervescent tablet 40 mEq, PRN   Or  potassium chloride 10 mEq/100 mL IVPB (Peripheral Line), PRN        Objective:  /78   Pulse 98   Temp 97.6 °F (36.4 °C) (Axillary)   Resp 16   Ht 5' 11\" (1.803 m)   Wt 169 lb 6.4 oz (76.8 kg)   SpO2 97%   BMI 23.63 kg/m²     Intake/Output Summary (Last 24 hours) at 12/1/2021 1310  Last data filed at 12/1/2021 0629  Gross per 24 hour   Intake 2293.43 ml   Output 1045 ml   Net 1248.43 ml      Wt Readings from Last 3 Encounters:   12/01/21 169 lb 6.4 oz (76.8 kg)   10/13/21 175 lb 3.2 oz (79.5 kg)   04/08/21 180 lb (81.6 kg)       General appearance:  Appears comfortable  Eyes: Sclera clear.  Pupils equal.  ENT: Moist oral mucosa. Trachea midline, no adenopathy. Cardiovascular: Regular rhythm, normal S1, S2. No murmur. No edema in lower extremities  Respiratory: Not using accessory muscles. Good inspiratory effort. Clear to auscultation bilaterally, no wheeze or crackles. GI: Abdomen slightly distended and firm, no tenderness, normal bowel sounds  Musculoskeletal: No cyanosis in digits, neck supple  Neurology: CN 2-12 grossly intact. No speech or motor deficits  Psych: Normal affect. Alert and oriented in time, place and person  Skin: Warm, dry, normal turgor    Labs and Tests:  CBC:   Recent Labs     11/29/21 2205 11/30/21 0757 12/01/21  0629   WBC 6.2 4.6 3.5*   HGB 10.9* 10.3* 9.8*    154 125*     BMP:    Recent Labs     11/29/21 2205 11/30/21 0757 12/01/21  0629   * 138 141   K 3.6 3.1* 3.9   CL 96* 99 102   CO2 32 32 31   BUN 10 9 10   CREATININE 0.7* 0.7* 0.7*   GLUCOSE 104* 91 68*     Hepatic:   Recent Labs     11/29/21 2205 11/30/21 0757 12/01/21  0629   AST 65* 39* 29   ALT 28 24 19   BILITOT 1.1* 1.1* 1.2*   ALKPHOS 248* 225* 184*     CT abd/pelvis  1. Dilation of small bowel to the level of the ileocolic anastomosis with   small bowel wall thickening could represent small bowel obstruction or ileus. 2. Large volume of ascites. 3. Right lower lobe mass is partially obscured by right lower lobe   atelectasis. 4. No significant change in the size of the liver lesions. Problem List  Active Problems:    Ileus ([address]    Colonic mass    SBO (small bowel obstruction) ([address]  Resolved Problems:    * No resolved hospital problems. *       Assessment & Plan:   1. Small bowel obstruction-patient having BMs. Awaiti SBFT. Surgery on board. 2. Large volume ascites-had not required paracentesis in the past. Has known metastatic colon Ca. If abdomen remains distended despite resolution of SBFT may need paracentesis. 3. Metastatic Colon CA-oncology on board.       Diet: Diet NPO Exceptions are: Sips of Water with Meds  Code:Full Code  DVT: justina Stoner PA-C   12/1/2021 1:10 PM

## 2021-12-01 NOTE — CONSULTS
[facility]   CONSULT NOTE    12/1/2021 2:50 PM    Patient Information: Alonso Curiel   Date of Admit:  11/29/2021  Primary Care Physician:  Dave Pal MD  Requesting Physician:  Alfredo Ramos MD    Reason for consult:   Evaluation and recommendations for metastatic colon cancer    Chief complaint:    Chief Complaint   Patient presents with    Other     Patient states he was at the doctor earlier today and they found a small bowel obstruction and was told to come to ER because they were concerned about it. History of Present Illness:  Alonso Curiel is a 57 y.o. male on Alfredo Ramos MD service who was admitted on 11/29/2021 for small bowel obstruction. Patient of Isabel Rocha MD with metastatic colon cancer. He is not currently on treatment due to side effects of chemotherapy. He was seen on 11/29/21 for follow up. He reported new onset abdominal pain, constipation, vomiting and bloating. He was sent for STAT KUB which found evidence of small bowel obstruction. He was sent to the ER for evaluation. Past Medical History:     has a past medical history of Anemia, Colon cancer ([address] Hypertension, and Lung nodule.      Past Surgical History:    Past Surgical History:   Procedure Laterality Date    COLONOSCOPY      CYST REMOVAL  1973    Behind right knee    LIVER BIOPSY Right 10/05/2017    [facility]en MD at [facility] in specials as outpt    OTHER SURGICAL HISTORY  03/19/2018     Robotic assisted converted to open right colectomy with anastomosis, greater omental vascularized pedicled flap creation    THORACOSCOPY Right 04/16/2018    RIGHT VIDEO ASSISTED THORACOSCOPY WITH WEDGE EXCISION RIGHT    TUNNELED VENOUS PORT PLACEMENT Left 05/30/2017    UPPER GASTROINTESTINAL ENDOSCOPY      WISDOM TOOTH EXTRACTION          Current Medications:     pantoprazole  40 mg IntraVENous Daily    metroNIDAZOLE  500 mg IntraVENous Q8H    ciprofloxacin  400 mg IntraVENous Q12H    sodium chloride flush  5-40 mL IntraVENous 2 times per day    enoxaparin  40 mg SubCUTAneous Nightly       Allergies: Allergies   Allergen Reactions    Potassium-Containing Compounds Rash        Social History:    reports that he has never smoked. He has never used smokeless tobacco. He reports current alcohol use of about 2.0 standard drinks of alcohol per week. He reports that he does not use drugs. Family History:     family history includes Cancer in his father; Mult Sclerosis in his mother; No Known Problems in his brother. ROS:      Per HPI; otherwise 10 point ROS is negative    PHYSICAL EXAM:    Vitals:  Vitals:    12/01/21 1320   BP: 104/73   Pulse: 96   Resp: 16   Temp: 97.3 °F (36.3 °C)   SpO2: 99%        Intake/Output Summary (Last 24 hours) at 12/1/2021 1450  Last data filed at 12/1/2021 6843  Gross per 24 hour   Intake 2293.43 ml   Output 1045 ml   Net 1248.43 ml      Wt Readings from Last 3 Encounters:   12/01/21 169 lb 6.4 oz (76.8 kg)   10/13/21 175 lb 3.2 oz (79.5 kg)   04/08/21 180 lb (81.6 kg)        General appearance: Appears comfortable. Eyes: Sclera clear, pupils equal  ENT: Moist mucus membranes, no thrush  Neck: Trachea midline, symmetrical  Cardiovascular: Regular rhythm, normal S1, S2. No murmur, gallop, rub. No edema in  lower extremities  Respiratory: Clear to auscultation bilaterally. No wheeze. Good inspiratory effort  Gastrointestinal: Abdomen firm, distended. Tenderness with palpation. Musculoskeletal: No cyanosis in digits, warm extremities  Neurologic: Cranial nerves grossly intact, no motor or speech deficits. Psychiatric: Normal affect. Alert and oriented to time, place and person.   Skin: Warm, dry, normal turgor, no rash    DATA:  CBC:   Lab Results   Component Value Date    WBC 3.5 12/01/2021    RBC 2.99 12/01/2021    RBC 5.11 08/21/2017    HGB 9.8 12/01/2021    HCT 29.8 12/01/2021    MCV 99.9 12/01/2021    MCH 32.9 12/01/2021    MCHC 33.0 12/01/2021    RDW 18.1 12/01/2021     12/01/2021    MPV 8.0 12/01/2021     BMP:  Lab Results   Component Value Date     12/01/2021    K 3.9 12/01/2021    K 3.1 11/30/2021     12/01/2021    CO2 31 12/01/2021    BUN 10 12/01/2021    CREATININE 0.7 12/01/2021    CALCIUM 7.7 12/01/2021    GFRAA >60 12/01/2021    LABGLOM >60 12/01/2021    GLUCOSE 68 12/01/2021    GLUCOSE 105 08/21/2017     Magnesium:   Lab Results   Component Value Date    MG 1.90 12/01/2021    MG 1.80 11/30/2021    MG 1.90 10/14/2021     LIVER PROFILE:   Recent Labs     11/29/21  2205 11/30/21  0757 12/01/21  0629   AST 65* 39* 29   ALT 28 24 19   LIPASE 10.0*  --   --    BILITOT 1.1* 1.1* 1.2*   ALKPHOS 248* 225* 184*     PT/INR:    Lab Results   Component Value Date    PROTIME 15.8 12/01/2021    PROTIME 12.3 07/02/2020    PROTIME 11.1 04/18/2018    INR 1.38 12/01/2021    INR 1.06 07/02/2020    INR 0.98 04/18/2018     IMAGING:    Narrative   EXAMINATION:   ONE SUPINE XRAY VIEW(S) OF THE ABDOMEN       11/29/2021 1:45 pm       COMPARISON:   None.       HISTORY:   ORDERING SYSTEM PROVIDED HISTORY: Ascending colon malignant neoplasm ([facility] [address]   TECHNOLOGIST PROVIDED HISTORY:   Reason for Exam: Malignant tumor of ascending colon   Acuity: Unknown   Type of Exam: Unknown       FINDINGS:   Supine AP view of the abdomen was obtained on 2 images.       There is gaseous distention of multiple small bowel loops.  Small bowel loops   measure up to 5.0 cm in diameter.  The colon is collapsed.  This may be   secondary to obstructing mass in the proximal colon given history of right   colon malignancy.  There is probable splenomegaly.  No additional evidence of   organomegaly.           Impression   1. Findings suggestive of small-bowel obstruction which could be secondary to   right colon mass.    2. Probable splenomegaly.         Narrative   EXAMINATION:   CT OF THE ABDOMEN AND PELVIS WITH CONTRAST 11/29/2021 10:40 pm       TECHNIQUE:   CT of the abdomen and pelvis was performed with the administration of   intravenous contrast. Multiplanar reformatted images are provided for review. Dose modulation, iterative reconstruction, and/or weight based adjustment of   the mA/kV was utilized to reduce the radiation dose to as low as reasonably   achievable.       COMPARISON:   10/07/2021       HISTORY:   ORDERING SYSTEM PROVIDED HISTORY: sbo   TECHNOLOGIST PROVIDED HISTORY:   Reason for exam:->sbo   Additional Contrast?->None   Decision Support Exception - unselect if not a suspected or confirmed   emergency medical condition->Emergency Medical Condition (MA)   Reason for Exam: SBO. Other (Patient states he was at the doctor earlier   today and they found a small bowel obstruction and was told to come to ER   because they were concerned about it. ). Acuity: Acute   Type of Exam: Initial       FINDINGS:   Lower Chest: Previously seen right lower lobe mass is obscured by partial   right lower lobe collapse. Candy [place]oll is also middle lobe atelectasis. [place]oll is   a small right pleural effusion.       Organs: There is fatty infiltration of the liver.  The low-attenuation mass   in the left hepatic lobe is essentially unchanged at 2.7 x 3.5 cm. Low-attenuation lesions at the dome of the liver are grossly stable.  The   spleen is enlarged.  The pancreas, gallbladder, adrenal glands and kidneys   are unremarkable.       GI/Bowel: Small bowel is mildly dilated with diffuse small bowel wall   thickening.  Small bowel is dilated to the level of the ileocolic   anastomosis.  The patient is status post right hemicolectomy.  Remaining   colon is unremarkable.       Pelvis: The bladder is grossly negative.       Peritoneum/Retroperitoneum: There is a large volume of ascites throughout the   abdomen and pelvis with diffuse mesenteric stranding.  Partially calcified   periportal lymph nodes are unchanged.  The aorta is unremarkable.       Bones/Soft Tissues: No acute finding or suspicious bone lesion.

## 2021-12-01 NOTE — PROGRESS NOTES
[facility]        Progress Note    Patient Name: Kiran Santana  MRN: [id number]  YOB: 1960  Date of Evaluation: 2021    Chief Complaint: Abdominal pain    Subjective:  No acute events overnight  Pain resolved  No further nausea or vomiting, NGT in place  Passing flatus and reports loose stool this morning--which has been baseline for the past couple of months      Vital Signs:  Patient Vitals for the past 24 hrs:   BP Temp Temp src Pulse Resp SpO2 Weight   21 0915 111/78 97.6 °F (36.4 °C) Axillary 98 16 97 % --   21 0600 -- -- -- -- -- -- 169 lb 6.4 oz (76.8 kg)   21 0015 112/76 97.9 °F (36.6 °C) Oral 93 -- 95 % --   21 -- -- -- 94 -- -- --   21 107/73 97.6 °F (36.4 °C) Oral 95 16 98 % --   21 1416 103/75 96.9 °F (36.1 °C) Axillary 91 -- 99 % --   21 1235 98/72 -- -- 90 10 97 % --      TEMPERATURE HISTORY 24H: Temp (24hrs), Av.5 °F (36.4 °C), Min:96.9 °F (36.1 °C), Max:97.9 °F (36.6 °C)    BLOOD PRESSURE HISTORY: Systolic (30GSY), UGE:997 , Min:95 , HUJ:888    Diastolic (35DKI), OAT:01, Min:70, Max:86      Intake/Output:  I/O last 3 completed shifts: In: 2293.4 [P.O.:205; I.V.:8.4]  Out: 1045 [Urine:460; Emesis/NG output:585]  No intake/output data recorded.   Drain/tube Output:       Physical Exam:  General: awake, alert, oriented to  person, place, time  Cardiovascular:  regular rate and rhythm and no murmur noted  Lungs: clear to auscultation  Abdomen: soft, mildly distended, dull to percussion, non-tender, bowel sounds present     Labs:  CBC:    Recent Labs     217 21  0629   WBC 6.2 4.6 3.5*   HGB 10.9* 10.3* 9.8*   HCT 33.1* 31.4* 29.8*    154 125*     BMP:    Recent Labs     217 21  0629   * 138 141   K 3.6 3.1* 3.9   CL 96* 99 102   CO2 32 32 31   BUN 10 9 10   CREATININE 0.7* 0.7* 0.7*   GLUCOSE 104* 91 68* Hepatic:    Recent Labs     11/29/21  2205 11/30/21  0757 12/01/21  0629   AST 65* 39* 29   ALT 28 24 19   BILITOT 1.1* 1.1* 1.2*   ALKPHOS 248* 225* 184*     Amylase:  No results found for: AMYLASE  Lipase:    Lab Results   Component Value Date    LIPASE 10.0 11/29/2021      Mag:    Lab Results   Component Value Date    MG 1.90 12/01/2021    MG 1.80 11/30/2021     Phos:     Lab Results   Component Value Date    PHOS 2.7 12/01/2021    PHOS 3.4 10/14/2021      Coags:   Lab Results   Component Value Date    PROTIME 15.8 12/01/2021    INR 1.38 12/01/2021    APTT 40.9 12/01/2021       Cultures:  Anaerobic culture  No results found for: LABANAE  Fungus stain  No results found for requested labs within last 30 days. Gram stain  No results found for requested labs within last 30 days. Organism  No results found for: [facility]  Surgical culture  No results found for: CXSURG  Blood culture 1  No results found for requested labs within last 30 days. Blood culture 2  No results found for requested labs within last 30 days. Fecal occult  No results found for requested labs within last 30 days. GI bacterial pathogens by PCR  Results in Past 30 Days  Result Component Current Result Ref Range Previous Result Ref Range   GI Bacterial Pathogens By PCR No Shigella spp/EIEC DNA detected  No Shiga toxin-producing gene(s) detected  No Campylobacter spp. (jejuni and coli)DNA detected  No Salmonella spp. DNA detected  Normal Range:  None detected   (11/30/2021)  Not in Time Range      C. difficile  No results found for requested labs within last 30 days.      Urine culture  Lab Results   Component Value Date    LABURIN No growth at 18-36 hours 04/12/2018       Pathology:  No relevant pathology     Imaging:  I have personally reviewed the following films:  Narrative   EXAMINATION:   SMALL BOWEL [address]       12/1/2021       TECHNIQUE:   Small bowel follow through series was performed with overhead images and spot   images.       FLUOROSCOPY DOSE AND TYPE OR TIME AND EXPOSURES:   Fluoroscopy was not used for the study.       COMPARISON:   CT abdomen/pelvis 11/29/2021       HISTORY:   ORDERING SYSTEM PROVIDED HISTORY: SBO, possible anastomotic stricture? water   soluble contrast to be given through NG if present   TECHNOLOGIST PROVIDED HISTORY:   Reason for exam:->SBO, possible anastomotic stricture? water soluble contrast   to be given through NG if present   Reason for Exam: SBO, possible anastomotic stricture? water soluble contrast   to be given through NG if present   Acuity: Unknown   Type of Exam: Initial       FINDINGS:   An initial  view of the abdomen and pelvis was performed which   demonstrates a NG tube overlying the region of the gastric antrum.  There is   surgical material from prior right colonic resection.  Then, water-soluble   contrast was instilled into the NG tube, and overhead images of the small   bowel were performed.       The stomach is normal in appearance.  There is normal transit time of   contrast from the stomach to the colon, with contrast reaching the colon at   approximately 30 minutes.  However, there is pan dilatation of multiple small   bowel loops throughout the abdomen, with wall thickening involving mid to   distal small bowel loops.  This most likely reflects a nonspecific enteritis   leading to a diffuse small bowel ileus. Beckey Short is no evidence of transition   point to suggest bowel obstruction. Beckey Short is no evidence of a small bowel   filling defect, stricture, mass, or ulcer. Beckey Short has been partial right   colectomy.  The visualized portion of the colon is grossly unremarkable, with   the final images demonstrating retained contrast within the rectum.           Impression   Patient status post partial right colectomy, with the bowel gas pattern most   consistent with an underlying small bowel enteritis leading to a diffuse   small bowel ileus. Beckey Short is no evidence of FINDINGS: Lower Chest: Previously seen right lower lobe mass is obscured by partial right lower lobe collapse. There is also middle lobe atelectasis. There is a small right pleural effusion. Organs: There is fatty infiltration of the liver. The low-attenuation mass in the left hepatic lobe is essentially unchanged at 2.7 x 3.5 cm. Low-attenuation lesions at the dome of the liver are grossly stable. The spleen is enlarged. The pancreas, gallbladder, adrenal glands and kidneys are unremarkable. GI/Bowel: Small bowel is mildly dilated with diffuse small bowel wall thickening. Small bowel is dilated to the level of the ileocolic anastomosis. The patient is status post right hemicolectomy. Remaining colon is unremarkable. Pelvis: The bladder is grossly negative. Peritoneum/Retroperitoneum: There is a large volume of ascites throughout the abdomen and pelvis with diffuse mesenteric stranding. Partially calcified periportal lymph nodes are unchanged. The aorta is unremarkable. Bones/Soft Tissues: No acute finding or suspicious bone lesion. There is degenerative disc disease at L5-S1.     1. Dilation of small bowel to the level of the ileocolic anastomosis with small bowel wall thickening could represent small bowel obstruction or ileus. 2. Large volume of ascites. 3. Right lower lobe mass is partially obscured by right lower lobe atelectasis. 4. No significant change in the size of the liver lesions. XR ABDOMEN FOR NG/OG/NE TUBE PLACEMENT    Result Date: 11/30/2021  EXAMINATION: ONE SUPINE XRAY VIEW(S) OF THE ABDOMEN 11/30/2021 12:50 pm COMPARISON: None. HISTORY: ORDERING SYSTEM PROVIDED HISTORY: Confirmation of course of NG/OG/NE tube and location of tip of tube TECHNOLOGIST PROVIDED HISTORY: Reason for exam:->Confirmation of course of NG/OG/NE tube and location of tip of tube Portable? ->Yes Reason for Exam: Other (Patient states he was at the doctor earlier today and they found a small bowel obstruction

## 2021-12-02 VITALS
BODY MASS INDEX: 23.72 KG/M2 | DIASTOLIC BLOOD PRESSURE: 75 MMHG | HEIGHT: 71 IN | HEART RATE: 82 BPM | OXYGEN SATURATION: 96 % | RESPIRATION RATE: 16 BRPM | WEIGHT: 169.4 LBS | SYSTOLIC BLOOD PRESSURE: 111 MMHG | TEMPERATURE: 97.8 F

## 2021-12-02 LAB
A/G RATIO: 0.5 (ref 1.1–2.2)
ALBUMIN SERPL-MCNC: 1.9 G/DL (ref 3.4–5)
ALP BLD-CCNC: 196 U/L (ref 40–129)
ALT SERPL-CCNC: 18 U/L (ref 10–40)
ANION GAP SERPL CALCULATED.3IONS-SCNC: 10 MMOL/L (ref 3–16)
AST SERPL-CCNC: 28 U/L (ref 15–37)
BASOPHILS ABSOLUTE: 0 K/UL (ref 0–0.2)
BASOPHILS RELATIVE PERCENT: 0.6 %
BILIRUB SERPL-MCNC: 0.7 MG/DL (ref 0–1)
BUN BLDV-MCNC: 10 MG/DL (ref 7–20)
CALCIUM SERPL-MCNC: 7.6 MG/DL (ref 8.3–10.6)
CHLORIDE BLD-SCNC: 99 MMOL/L (ref 99–110)
CO2: 28 MMOL/L (ref 21–32)
CREAT SERPL-MCNC: 0.8 MG/DL (ref 0.8–1.3)
EOSINOPHILS ABSOLUTE: 0.1 K/UL (ref 0–0.6)
EOSINOPHILS RELATIVE PERCENT: 1.5 %
GFR AFRICAN AMERICAN: >60
GFR NON-AFRICAN AMERICAN: >60
GLUCOSE BLD-MCNC: 102 MG/DL (ref 70–99)
HCT VFR BLD CALC: 31.6 % (ref 40.5–52.5)
HEMOGLOBIN: 10.4 G/DL (ref 13.5–17.5)
LYMPHOCYTES ABSOLUTE: 1.4 K/UL (ref 1–5.1)
LYMPHOCYTES RELATIVE PERCENT: 23 %
MAGNESIUM: 1.7 MG/DL (ref 1.8–2.4)
MAGNESIUM: 2.2 MG/DL (ref 1.8–2.4)
MCH RBC QN AUTO: 32.9 PG (ref 26–34)
MCHC RBC AUTO-ENTMCNC: 33.1 G/DL (ref 31–36)
MCV RBC AUTO: 99.6 FL (ref 80–100)
MONOCYTES ABSOLUTE: 0.8 K/UL (ref 0–1.3)
MONOCYTES RELATIVE PERCENT: 13.2 %
NEUTROPHILS ABSOLUTE: 3.7 K/UL (ref 1.7–7.7)
NEUTROPHILS RELATIVE PERCENT: 61.7 %
PDW BLD-RTO: 17.6 % (ref 12.4–15.4)
PLATELET # BLD: 164 K/UL (ref 135–450)
PMV BLD AUTO: 7.4 FL (ref 5–10.5)
POTASSIUM REFLEX MAGNESIUM: 3 MMOL/L (ref 3.5–5.1)
POTASSIUM SERPL-SCNC: 3.2 MMOL/L (ref 3.5–5.1)
POTASSIUM SERPL-SCNC: 3.3 MMOL/L (ref 3.5–5.1)
RBC # BLD: 3.17 M/UL (ref 4.2–5.9)
SODIUM BLD-SCNC: 137 MMOL/L (ref 136–145)
TOTAL PROTEIN: 5.4 G/DL (ref 6.4–8.2)
WBC # BLD: 6 K/UL (ref 4–11)

## 2021-12-02 PROCEDURE — 99232 SBSQ HOSP IP/OBS MODERATE 35: CPT | Performed by: SURGERY

## 2021-12-02 PROCEDURE — 2500000003 HC RX 250 WO HCPCS: Performed by: SURGERY

## 2021-12-02 PROCEDURE — APPNB30 APP NON BILLABLE TIME 0-30 MINS: Performed by: NURSE PRACTITIONER

## 2021-12-02 PROCEDURE — 83735 ASSAY OF MAGNESIUM: CPT

## 2021-12-02 PROCEDURE — C9113 INJ PANTOPRAZOLE SODIUM, VIA: HCPCS | Performed by: NURSE PRACTITIONER

## 2021-12-02 PROCEDURE — 6360000002 HC RX W HCPCS: Performed by: PHYSICIAN ASSISTANT

## 2021-12-02 PROCEDURE — 6360000002 HC RX W HCPCS: Performed by: SURGERY

## 2021-12-02 PROCEDURE — 2580000003 HC RX 258: Performed by: PHYSICIAN ASSISTANT

## 2021-12-02 PROCEDURE — 6370000000 HC RX 637 (ALT 250 FOR IP): Performed by: HOSPITALIST

## 2021-12-02 PROCEDURE — 6360000002 HC RX W HCPCS: Performed by: NURSE PRACTITIONER

## 2021-12-02 PROCEDURE — APPSS15 APP SPLIT SHARED TIME 0-15 MINUTES: Performed by: NURSE PRACTITIONER

## 2021-12-02 PROCEDURE — 6360000002 HC RX W HCPCS: Performed by: HOSPITALIST

## 2021-12-02 PROCEDURE — 80053 COMPREHEN METABOLIC PANEL: CPT

## 2021-12-02 PROCEDURE — 85025 COMPLETE CBC W/AUTO DIFF WBC: CPT

## 2021-12-02 PROCEDURE — 36415 COLL VENOUS BLD VENIPUNCTURE: CPT

## 2021-12-02 PROCEDURE — 84132 ASSAY OF SERUM POTASSIUM: CPT

## 2021-12-02 RX ORDER — CIPROFLOXACIN 500 MG/1
500 TABLET, FILM COATED ORAL 2 TIMES DAILY
Qty: 14 TABLET | Refills: 0 | Status: ON HOLD
Start: 2021-12-02 | End: 2021-12-17 | Stop reason: HOSPADM

## 2021-12-02 RX ORDER — POTASSIUM CHLORIDE 20 MEQ/1
20 TABLET, EXTENDED RELEASE ORAL ONCE
Status: DISCONTINUED | OUTPATIENT
Start: 2021-12-02 | End: 2021-12-03 | Stop reason: HOSPADM

## 2021-12-02 RX ORDER — METRONIDAZOLE 500 MG/1
500 TABLET ORAL 3 TIMES DAILY
Qty: 21 TABLET | Refills: 0 | Status: ON HOLD
Start: 2021-12-02 | End: 2021-12-17 | Stop reason: HOSPADM

## 2021-12-02 RX ORDER — POTASSIUM CHLORIDE 7.45 MG/ML
10 INJECTION INTRAVENOUS PRN
Status: DISCONTINUED | OUTPATIENT
Start: 2021-12-02 | End: 2021-12-02 | Stop reason: SDUPTHER

## 2021-12-02 RX ORDER — POTASSIUM CHLORIDE 20 MEQ/1
40 TABLET, EXTENDED RELEASE ORAL PRN
Status: DISCONTINUED | OUTPATIENT
Start: 2021-12-02 | End: 2021-12-02 | Stop reason: SDUPTHER

## 2021-12-02 RX ORDER — MAGNESIUM SULFATE IN WATER 40 MG/ML
2000 INJECTION, SOLUTION INTRAVENOUS ONCE
Status: COMPLETED | OUTPATIENT
Start: 2021-12-02 | End: 2021-12-02

## 2021-12-02 RX ORDER — POTASSIUM CHLORIDE 7.45 MG/ML
10 INJECTION INTRAVENOUS
Status: COMPLETED | OUTPATIENT
Start: 2021-12-02 | End: 2021-12-02

## 2021-12-02 RX ADMIN — CIPROFLOXACIN 400 MG: 2 INJECTION, SOLUTION INTRAVENOUS at 03:43

## 2021-12-02 RX ADMIN — POTASSIUM CHLORIDE 10 MEQ: 10 INJECTION, SOLUTION INTRAVENOUS at 12:07

## 2021-12-02 RX ADMIN — PANTOPRAZOLE SODIUM 40 MG: 40 INJECTION, POWDER, FOR SOLUTION INTRAVENOUS at 08:18

## 2021-12-02 RX ADMIN — METRONIDAZOLE 500 MG: 500 INJECTION, SOLUTION INTRAVENOUS at 16:08

## 2021-12-02 RX ADMIN — POTASSIUM CHLORIDE 40 MEQ: 20 TABLET, EXTENDED RELEASE ORAL at 18:36

## 2021-12-02 RX ADMIN — POTASSIUM CHLORIDE 10 MEQ: 10 INJECTION, SOLUTION INTRAVENOUS at 14:20

## 2021-12-02 RX ADMIN — METRONIDAZOLE 500 MG: 500 INJECTION, SOLUTION INTRAVENOUS at 08:21

## 2021-12-02 RX ADMIN — MAGNESIUM SULFATE 2000 MG: 2 INJECTION INTRAVENOUS at 10:05

## 2021-12-02 RX ADMIN — SODIUM CHLORIDE: 9 INJECTION, SOLUTION INTRAVENOUS at 08:20

## 2021-12-02 RX ADMIN — POTASSIUM CHLORIDE 10 MEQ: 7.46 INJECTION, SOLUTION INTRAVENOUS at 14:25

## 2021-12-02 RX ADMIN — CIPROFLOXACIN 400 MG: 2 INJECTION, SOLUTION INTRAVENOUS at 17:18

## 2021-12-02 RX ADMIN — POTASSIUM CHLORIDE 10 MEQ: 10 INJECTION, SOLUTION INTRAVENOUS at 13:16

## 2021-12-02 ASSESSMENT — PAIN SCALES - GENERAL
PAINLEVEL_OUTOF10: 0
PAINLEVEL_OUTOF10: 0

## 2021-12-02 NOTE — DISCHARGE SUMMARY
[facility] DISCHARGE SUMMARY    Patient Demographics    Patient. Kiran Santana  Date of Birth. 1960  MRN. [id number]     Primary care provider. Devante Bella MD  (Tel: [phone])    Admit date: 11/29/2021    Discharge date (blank if same as Note Date): Note Date: 12/2/2021     Reason for Hospitalization. Chief Complaint   Patient presents with    Other     Patient states he was at the doctor earlier today and they found a small bowel obstruction and was told to come to ER because they were concerned about it. Significant Findings. Active Problems:    Ileus ([address]    Colonic mass    SBO (small bowel obstruction) (HCC)  Resolved Problems:    * No resolved hospital problems. *       Problems and results from this hospitalization that need follow up. 1. SBO  2. Metastatic colon CA    Significant test results and incidental findings. 1. CT abdomen pelvis  1. Dilation of small bowel to the level of the ileocolic anastomosis with   small bowel wall thickening could represent small bowel obstruction or ileus. 2. Large volume of ascites. 3. Right lower lobe mass is partially obscured by right lower lobe   atelectasis. 4. No significant change in the size of the liver lesions. SBFT  Patient status post partial right colectomy, with the bowel gas pattern most   consistent with an underlying small bowel enteritis leading to a diffuse   small bowel ileus. Reg Bhaskar is no evidence of small-bowel obstruction and no   evidence to suggest an anastomotic stricture. Invasive procedures and treatments. 1. None     Problem-based Hospital Course. Patient is a 58-year-old male with metastatic colon cancer who presented to hospital with intermittent nausea vomiting and abdominal pain. His symptoms have been present for the past month.   CT scan the abdomen pelvis emergency room revealed layer over the port 30 minutes prior to treatment and cover with saran wrap. Lomotil 2.5-0.025 MG per tablet  Generic drug: diphenoxylate-atropine     LORazepam 1 MG tablet  Commonly known as: ATIVAN     Magic Mouthwash  Commonly known as: Miracle Mouthwash     sodium chloride 0.9 % SOLN with fluorouracil 500 MG/10ML SOLN        STOP taking these medications    potassium chloride 20 MEQ extended release tablet  Commonly known as: KLOR-CON M           Where to Get Your Medications      These medications were sent to [facility] [address] [facility], [address] [address] [facility] [phone]  [address], [address]    Phone: [phone]   · ciprofloxacin 500 MG tablet  · metroNIDAZOLE 500 MG tablet         Discharge recommendations given to patient. Follow Up. General surgery and oncology in 1 week   Disposition. home  Activity. activity as tolerated  Diet: ADULT DIET; Regular; Low Fiber      Spent 37 minutes in discharge process. Signed:   Brigette Fernandez PA-C     12/2/2021 1:18 PM

## 2021-12-02 NOTE — PROGRESS NOTES
Report received from [facility], pt is alert and oriented in room, denies any pain or other needs at this time, tolerating diet, will monitor pt.

## 2021-12-02 NOTE — PLAN OF CARE
Problem: Pain:  Goal: Pain level will decrease  Description: Pain level will decrease  Outcome: Ongoing  Note: No c/o pain, will continue to monitor

## 2021-12-02 NOTE — PROGRESS NOTES
[facility]   Progress Note      12/2/2021 9:16 AM        Name: Toya Coley . Admitted: 11/29/2021    SUBJECTIVE:  He is feeling better, NG has been removed, he is tolerating clear liquid diet, denies abdominal pain, passing gas and stool     Reviewed interval ancillary notes    Current Medications  melatonin tablet 3 mg, Nightly PRN  pantoprazole (PROTONIX) injection 40 mg, Daily  0.9 % sodium chloride infusion, Continuous  metronidazole (FLAGYL) 500 mg in NaCl 100 mL IVPB premix, Q8H  ciprofloxacin (CIPRO) IVPB 400 mg, Q12H  dicyclomine (BENTYL) capsule 10 mg, TID PRN  sodium chloride flush 0.9 % injection 5-40 mL, 2 times per day  sodium chloride flush 0.9 % injection 10 mL, PRN  0.9 % sodium chloride infusion, PRN  enoxaparin (LOVENOX) injection 40 mg, Nightly  ondansetron (ZOFRAN-ODT) disintegrating tablet 4 mg, Q8H PRN   Or  ondansetron (ZOFRAN) injection 4 mg, Q6H PRN  polyethylene glycol (GLYCOLAX) packet 17 g, Daily PRN  acetaminophen (TYLENOL) tablet 650 mg, Q6H PRN   Or  acetaminophen (TYLENOL) suppository 650 mg, Q6H PRN  lidocaine viscous hcl (XYLOCAINE) 2 % solution 15 mL, Q3H PRN  potassium chloride (KLOR-CON M) extended release tablet 40 mEq, PRN   Or  potassium bicarb-citric acid (EFFER-K) effervescent tablet 40 mEq, PRN   Or  potassium chloride 10 mEq/100 mL IVPB (Peripheral Line), PRN        Objective:  /67   Pulse 82   Temp 97.9 °F (36.6 °C) (Oral)   Resp 16   Ht 5' 11\" (1.803 m)   Wt 169 lb 6.4 oz (76.8 kg)   SpO2 95%   BMI 23.63 kg/m²     Intake/Output Summary (Last 24 hours) at 12/2/2021 0916  Last data filed at 12/1/2021 1505  Gross per 24 hour   Intake --   Output 75 ml   Net -75 ml      Wt Readings from Last 3 Encounters:   12/01/21 169 lb 6.4 oz (76.8 kg)   10/13/21 175 lb 3.2 oz (79.5 kg)   04/08/21 180 lb (81.6 kg)       General appearance:  Appears comfortable  Eyes: Sclera clear. Pupils equal.  ENT: Moist oral mucosa.  Trachea midline, no adenopathy. Cardiovascular: Regular rhythm, normal S1, S2. No murmur. No edema in lower extremities  Respiratory: Not using accessory muscles. Good inspiratory effort. Clear to auscultation bilaterally, no wheeze or crackles. GI: Abdomen: firm, no tenderness, not distended, + ascites  Musculoskeletal: No cyanosis in digits, neck supple  Neurology: CN 2-12 grossly intact. No speech or motor deficits  Psych: Normal affect. Alert and oriented in time, place and person  Skin: Warm, dry, normal turgor    Labs and Tests:  CBC:   Recent Labs     11/30/21 0757 12/01/21  0629 12/02/21  0620   WBC 4.6 3.5* 6.0   HGB 10.3* 9.8* 10.4*    125* 164     BMP:    Recent Labs     11/30/21  0757 12/01/21  0629 12/02/21  0620    141 137   K 3.1* 3.9 3.0*   CL 99 102 99   CO2 32 31 28   BUN 9 10 10   CREATININE 0.7* 0.7* 0.8   GLUCOSE 91 68* 102*     Hepatic:   Recent Labs     11/30/21  0757 12/01/21  0629 12/02/21  0620   AST 39* 29 28   ALT 24 19 18   BILITOT 1.1* 1.2* 0.7   ALKPHOS 225* 184* 196*       ASSESSMENT AND PLAN    Active Problems:    Ileus (HCC)    Colonic mass    SBO (small bowel obstruction) (HCC)  Resolved Problems:    * No resolved hospital problems. *      1. Metastatic colon cancer  -Treatment on hold due to side effects, declining performance status.     2. Small bowel obstruction  -Bowel rest, NG tube removed 12/1/21.  -General surgery following, no plans for surgical intervention.      3. Ascites  -May need paracentesis.        [address], [address] [facility]  [facility]    Patient feels better. Abdomen is bloated. No nausea vomiting  No diarrhea  We will hold off with paracentesis.     Trina Jay MD

## 2021-12-02 NOTE — PROGRESS NOTES
[facility]        Progress Note    Patient Name: Dori Roy  MRN: [id number]  YOB: 1960  Date of Evaluation: 2021    Chief Complaint: Abdominal pain    Subjective:  No acute events overnight  Pain resolved  No nausea or vomiting since NGT remove, tolerating clear liquids  Passing flatus and stool, only mild bloating--improved  Resting in bed at this time      Vital Signs:  Patient Vitals for the past 24 hrs:   BP Temp Temp src Pulse Resp SpO2   21 1220 107/75 98.1 °F (36.7 °C) Oral 90 16 97 %   21 0753 105/67 97.9 °F (36.6 °C) Oral 82 16 95 %   21 0344 100/64 97.7 °F (36.5 °C) Oral 82 16 94 %   21 2123 100/85 97.8 °F (36.6 °C) Temporal 101 16 98 %   21 1737 114/82 97.3 °F (36.3 °C) Temporal 97 16 --      TEMPERATURE HISTORY 24H: Temp (24hrs), Av.8 °F (36.6 °C), Min:97.3 °F (36.3 °C), Max:98.1 °F (36.7 °C)    BLOOD PRESSURE HISTORY: Systolic (52DIE), AVH:120 , Min:100 , YHL:513    Diastolic (03JHP), DWS:06, Min:64, Max:85      Intake/Output:  I/O last 3 completed shifts:  In: -   Out: 75 [Emesis/NG output:75]  No intake/output data recorded.   Drain/tube Output:       Physical Exam:  General: awake, alert, oriented to person, place, time  Cardiovascular:  regular rate and rhythm and no murmur noted  Lungs: clear to auscultation  Abdomen: soft, mildly distended, dull to percussion, non-tender, bowel sounds present     Labs:  CBC:    Recent Labs     21  0757 21  0629 21  0620   WBC 4.6 3.5* 6.0   HGB 10.3* 9.8* 10.4*   HCT 31.4* 29.8* 31.6*    125* 164     BMP:    Recent Labs     21  0757 21  0629 21  0620    141 137   K 3.1* 3.9 3.0*   CL 99 102 99   CO2 32 31 28   BUN 9 10 10   CREATININE 0.7* 0.7* 0.8   GLUCOSE 91 68* 102*     Hepatic:    Recent Labs     21  0757 21  0629 21  0620   AST 39* 29 28   ALT 24 19 18   BILITOT 1.1* 1.2* 0.7   ALKPHOS 225* 184* 196*     Amylase:  No results found for: AMYLASE  Lipase:    Lab Results   Component Value Date    LIPASE 10.0 11/29/2021      Mag:    Lab Results   Component Value Date    MG 1.70 12/02/2021    MG 1.90 12/01/2021     Phos:     Lab Results   Component Value Date    PHOS 2.7 12/01/2021    PHOS 3.4 10/14/2021      Coags:   Lab Results   Component Value Date    PROTIME 15.8 12/01/2021    INR 1.38 12/01/2021    APTT 40.9 12/01/2021       Cultures:  Anaerobic culture  No results found for: LABANAE  Fungus stain  No results found for requested labs within last 30 days. Gram stain  No results found for requested labs within last 30 days. Organism  No results found for: [facility]  Surgical culture  No results found for: CXSURG  Blood culture 1  No results found for requested labs within last 30 days. Blood culture 2  No results found for requested labs within last 30 days. Fecal occult  No results found for requested labs within last 30 days. GI bacterial pathogens by PCR  Results in Past 30 Days  Result Component Current Result Ref Range Previous Result Ref Range   GI Bacterial Pathogens By PCR No Shigella spp/EIEC DNA detected  No Shiga toxin-producing gene(s) detected  No Campylobacter spp. (jejuni and coli)DNA detected  No Salmonella spp. DNA detected  Normal Range:  None detected   (11/30/2021)  Not in Time Range      C. difficile  No results found for requested labs within last 30 days. Urine culture  Lab Results   Component Value Date    LABURIN No growth at 18-36 hours 04/12/2018       Pathology:  No relevant pathology     Imaging:  I have personally reviewed the following films:    FL SMALL BOWEL FOLLOW THROUGH ONLY    Result Date: 12/1/2021  EXAMINATION: SMALL BOWEL FOLLOW THROUGH SERIES 12/1/2021 TECHNIQUE: Small bowel follow through series was performed with overhead images and spot images. FLUOROSCOPY DOSE AND TYPE OR TIME AND EXPOSURES: Fluoroscopy was not used for the study.  COMPARISON: CT abdomen/pelvis 11/29/2021 HISTORY: ORDERING SYSTEM PROVIDED HISTORY: SBO, possible anastomotic stricture? water soluble contrast to be given through NG if present TECHNOLOGIST PROVIDED HISTORY: Reason for exam:->SBO, possible anastomotic stricture? water soluble contrast to be given through NG if present Reason for Exam: SBO, possible anastomotic stricture? water soluble contrast to be given through NG if present Acuity: Unknown Type of Exam: Initial FINDINGS: An initial  view of the abdomen and pelvis was performed which demonstrates a NG tube overlying the region of the gastric antrum. There is surgical material from prior right colonic resection. Then, water-soluble contrast was instilled into the NG tube, and overhead images of the small bowel were performed. The stomach is normal in appearance. There is normal transit time of contrast from the stomach to the colon, with contrast reaching the colon at approximately 30 minutes. However, there is pan dilatation of multiple small bowel loops throughout the abdomen, with wall thickening involving mid to distal small bowel loops. This most likely reflects a nonspecific enteritis leading to a diffuse small bowel ileus. There is no evidence of transition point to suggest bowel obstruction. There is no evidence of a small bowel filling defect, stricture, mass, or ulcer. There has been partial right colectomy. The visualized portion of the colon is grossly unremarkable, with the final images demonstrating retained contrast within the rectum. Patient status post partial right colectomy, with the bowel gas pattern most consistent with an underlying small bowel enteritis leading to a diffuse small bowel ileus. There is no evidence of small-bowel obstruction and no evidence to suggest an anastomotic stricture.      Scheduled Meds:   potassium chloride  10 mEq IntraVENous Q1H    potassium chloride  20 mEq Oral Once    pantoprazole  40 mg IntraVENous Daily    metroNIDAZOLE  500 mg IntraVENous Q8H    ciprofloxacin  400 mg IntraVENous Q12H    sodium chloride flush  5-40 mL IntraVENous 2 times per day    enoxaparin  40 mg SubCUTAneous Nightly     Continuous Infusions:   sodium chloride 125 mL/hr at 12/02/21 0820    sodium chloride       PRN Meds:.melatonin, dicyclomine, sodium chloride flush, sodium chloride, ondansetron **OR** ondansetron, polyethylene glycol, acetaminophen **OR** acetaminophen, lidocaine viscous hcl, potassium chloride **OR** potassium alternative oral replacement **OR** potassium chloride      Assessment:  57 y.o. male admitted with   1. SBO (small bowel obstruction) (HCC)    2. Other ascites    3. History of colon cancer        Small bowel obstruction  Enteritis  Colon cancer with liver and lung metastasis  Hypertension       Plan:  1. No further abdominal pain, passing stool, tolerating clear liquids since NGT removed; no plans for surgical intervention at this time  2. Advance to low fiber diet as tolerated; monitor bowel function  3. Stop IV hydration; monitor and correct electrolytes  4. Antibiotics, switch to PO at discharge  5. Activity as tolerated, ambulate TID  6. PRN analgesics and antiemetics--minimizing narcotics as tolerated  7. DVT prophylaxis with Lovenox and SCD's  8. Management of medical comorbid etiologies per primary team and consulting services  9. Disposition: Okay for discharge from a surgical perspective; follow up with Dr. Conrad Franco as needed    EDUCATION:  Educated patient on plan of care and disease process--all questions answered. Plans discussed with patient and nursing. Reviewed and discussed with Dr. Gale Perez. Signed:  VIJAY Hernández CNP  12/2/2021 1:27 PM    I have personally performed a face to face diagnostic evaluation on this patient. I have interviewed and examined the patient and I agree with the assessment above.  In summary, my findings and plan are the following:   Mr. Lian Coulter is a 57 y.o. male who presents with   Small bowel obstruction  Metastatic colon cancer     Plan:  1. Clinically ileus/small bowel obstruction resolved. Does not need intervention  2. Tolerating diet  3. Passing stool  4. Antibiotics for enteritis, continue 7 days PO after discharge  5. Pain and nausea resolved  6. Defer management of remainder of medical comorbidities to primary and consulting teams  7. Discharge planning, may discharge from surgical standpoint    Eder Jarvis MD, FACS  12/2/2021  5:55 PM

## 2021-12-03 ENCOUNTER — TELEPHONE (OUTPATIENT)
Dept: SURGERY | Age: 61
End: 2021-12-03

## 2021-12-03 ENCOUNTER — CARE COORDINATION (OUTPATIENT)
Dept: CASE MANAGEMENT | Age: 61
End: 2021-12-03

## 2021-12-03 DIAGNOSIS — K56.609 SBO (SMALL BOWEL OBSTRUCTION) (HCC): Primary | ICD-10-CM

## 2021-12-03 NOTE — PROGRESS NOTES
Physician Progress Note      PATIENT:               Nivia Luna  CSN #:                  [id number]  :                       1960  ADMIT DATE:       2021 9:23 PM  Isha Sanchez DATE:        2021 8:00 PM  RESPONDING  PROVIDER #:        Veronica Gaines CNP          QUERY TEXT:    Patient admitted with SBO and ileus, noted to have low WBC, low Hgb and low   platelet count. If possible, please document in progress notes and/or   discharge summary if you are evaluating and/or treating any of the following: The medical record reflects the following:  Risk Factors: Hx Colon Cancer with mets to Lung and Liver  Clinical Indicators: Labs today show: WBC 3.5, Hgb 9.8, and Platelets 663;   Last chemo was 10/15/21  Treatment: Monitoring of repeated labs  Options provided:  -- Pancytopenia due to underlying cancer, now resolved  -- Antineoplastic (chemotherapy) induced pancytopenia, now resolved  -- Unspecified pancytopenia, now resolved  -- Other - I will add my own diagnosis  -- Disagree - Not applicable / Not valid  -- Disagree - Clinically unable to determine / Unknown  -- Refer to Clinical Documentation Reviewer    PROVIDER RESPONSE TEXT:    Patient has antineoplastic (chemotherapy) induced pancytopenia, now resolved.     Query created by: Brooke Myers on 12/3/2021 8:45 AM      Electronically signed by:  Veronica Gaines CNP 12/3/2021 10:31 AM

## 2021-12-03 NOTE — TELEPHONE ENCOUNTER
I recommend he follow up with Dr Judie Breaux for colonoscopy in next few weeks to make sure the mass has not recurred at the anastomosis. I am also happy to see him in office if he would like after he gets the scope.

## 2021-12-03 NOTE — TELEPHONE ENCOUNTER
LM informing patient to follow up with Dr. Stanley Foley for colonoscopy then follow up in office with Dr. Miryam Velez after complete.

## 2021-12-03 NOTE — TELEPHONE ENCOUNTER
Pt called in asking to speak to Dr. Lani Muhammad. He stated he had surgery in 2018 and recently had an obstruction at the sight of the surgery. The obstruction has been handled but he would like to speak to Dr. Lani Muhammad to make sure he does not need to be doing anything more.

## 2021-12-03 NOTE — CARE COORDINATION
[facility] Transitions Initial Follow Up Call:    (Patient has follow up with Dr. Sharad Arboleda at [facility] on Monday.)    Patient reports that he is feeling well, denies any N,V, diarrhea, pain. Discussed discharge instructions and reviewed medications, 1111F completed. He is going to  antibiotics at this time. He denies any questions or concerns. CTN will continue with outreach follow up calls. Call within 2 business days of discharge: Yes    Patient: Cheng Nj Patient : 1960   MRN: [id number]  Reason for Admission: Ileus, sm bowel obst, colonic mass  Discharge Date: 21 RARS: Readmission Risk Score: 19.9 ( )      Last Discharge [facility]       Complaint Diagnosis Description Type Department Provider    21 [facility] . .. ED to Hosp-Admission (Discharged) (ADMITTED) Miguelito Elliott MD; Yves Galvan Spoke with: Cheng Nj      Transitions of Care Initial Call    Was this an external facility discharge? No Discharge Facility:     Challenges to be reviewed by the provider   Additional needs identified to be addressed with provider: No  none             Method of communication with provider : none      Advance Care Planning:   Does patient have an Advance Directive: reviewed and current. Was this a readmission? No  Patient stated reason for admission: Ileus, sm bowel obst, colonic mass  Patients top risk factors for readmission: medical condition-.    Care Transition Nurse (CTN) contacted the patient by telephone to perform post hospital discharge assessment. Verified name and  with patient as identifiers. Provided introduction to self, and explanation of the CTN role. CTN reviewed discharge instructions, medical action plan and red flags with patient who verbalized understanding. Patient given an opportunity to ask questions and does not have any further questions or concerns at this time.  Were discharge instructions available to patient? Yes. Reviewed appropriate site of care based on symptoms and resources available to patient including: PCP, Urgent care clinics and When to call 911. The patient agrees to contact the PCP office for questions related to their healthcare. Medication reconciliation was performed with patient, who verbalizes understanding of administration of home medications. Advised obtaining a 90-day supply of all daily and as-needed medications. Covid Risk Education     Educated patient about risk for severe COVID-19 due to risk factors according to CDC guidelines. CTN reviewed discharge instructions, medical action plan and red flag symptoms with the patient who verbalized understanding. Discussed COVID vaccination status: Yes. Education provided on COVID-19 vaccination as appropriate. Discussed exposure protocols and quarantine with CDC Guidelines. Patient was given an opportunity to verbalize any questions and concerns and agrees to contact CTN or health care provider for questions related to their healthcare. Reviewed and educated patient on any new and changed medications related to discharge diagnosis. Was patient discharged with a pulse oximeter? No     CTN provided contact information. Plan for follow-up call in 5-7 days based on severity of symptoms and risk factors. Plan for next call: symptom management-.  self management-.  follow up appointment-. Non-face-to-face services provided:  Obtained and reviewed discharge summary and/or continuity of care documents    Care Transitions 24 Hour Call    Do you have any ongoing symptoms?: No  Do you have a copy of your discharge instructions?: Yes  Do you have all of your prescriptions and are they filled?: No  Have you been contacted by a [id number] MD [employer] Pharmacist?: No  Have you scheduled your follow up appointment?: No  Were you discharged with any Home Care or Post Acute Services: No  Post Acute Services:  Outpatient/Community Services  Care Transitions Interventions         Follow Up  No future appointments.     Neelam Bach RN

## 2021-12-05 ENCOUNTER — HOSPITAL ENCOUNTER (INPATIENT)
Age: 61
LOS: 12 days | Discharge: HOME | DRG: 329 | End: 2021-12-17
Attending: EMERGENCY MEDICINE | Admitting: FAMILY MEDICINE
Payer: COMMERCIAL

## 2021-12-05 ENCOUNTER — APPOINTMENT (OUTPATIENT)
Dept: GENERAL RADIOLOGY | Age: 61
DRG: 329 | End: 2021-12-05
Payer: COMMERCIAL

## 2021-12-05 ENCOUNTER — APPOINTMENT (OUTPATIENT)
Dept: CT IMAGING | Age: 61
DRG: 329 | End: 2021-12-05
Payer: COMMERCIAL

## 2021-12-05 DIAGNOSIS — K56.609 SBO (SMALL BOWEL OBSTRUCTION) (HCC): Primary | ICD-10-CM

## 2021-12-05 DIAGNOSIS — R65.20 SEVERE SEPSIS (HCC): ICD-10-CM

## 2021-12-05 DIAGNOSIS — A41.9 SEVERE SEPSIS (HCC): ICD-10-CM

## 2021-12-05 DIAGNOSIS — J18.9 PNEUMONIA DUE TO INFECTIOUS ORGANISM, UNSPECIFIED LATERALITY, UNSPECIFIED PART OF LUNG: ICD-10-CM

## 2021-12-05 LAB
ALBUMIN SERPL-MCNC: 2.1 G/DL (ref 3.4–5)
ALP BLD-CCNC: 238 U/L (ref 40–129)
ALT SERPL-CCNC: 21 U/L (ref 10–40)
ANION GAP SERPL CALCULATED.3IONS-SCNC: 7 MMOL/L (ref 3–16)
AST SERPL-CCNC: 44 U/L (ref 15–37)
BASOPHILS ABSOLUTE: 0.1 K/UL (ref 0–0.2)
BASOPHILS RELATIVE PERCENT: 0.4 %
BILIRUB SERPL-MCNC: 1.3 MG/DL (ref 0–1)
BILIRUBIN DIRECT: 0.5 MG/DL (ref 0–0.3)
BILIRUBIN URINE: NEGATIVE
BILIRUBIN, INDIRECT: 0.8 MG/DL (ref 0–1)
BLOOD, URINE: ABNORMAL
BUN BLDV-MCNC: 9 MG/DL (ref 7–20)
CALCIUM SERPL-MCNC: 8.4 MG/DL (ref 8.3–10.6)
CHLORIDE BLD-SCNC: 95 MMOL/L (ref 99–110)
CLARITY: CLEAR
CO2: 28 MMOL/L (ref 21–32)
COLOR: YELLOW
CREAT SERPL-MCNC: 0.9 MG/DL (ref 0.8–1.3)
EOSINOPHILS ABSOLUTE: 0 K/UL (ref 0–0.6)
EOSINOPHILS RELATIVE PERCENT: 0.2 %
EPITHELIAL CELLS, UA: 1 /HPF (ref 0–5)
GFR AFRICAN AMERICAN: >60
GFR NON-AFRICAN AMERICAN: >60
GLUCOSE BLD-MCNC: 112 MG/DL (ref 70–99)
GLUCOSE URINE: NEGATIVE MG/DL
HCT VFR BLD CALC: 35.8 % (ref 40.5–52.5)
HEMOGLOBIN: 11.8 G/DL (ref 13.5–17.5)
HYALINE CASTS: 0 /LPF (ref 0–8)
INR BLD: 1.38 (ref 0.88–1.12)
KETONES, URINE: NEGATIVE MG/DL
LACTIC ACID: 1.6 MMOL/L (ref 0.4–2)
LACTIC ACID: 2.3 MMOL/L (ref 0.4–2)
LEUKOCYTE ESTERASE, URINE: NEGATIVE
LIPASE: 13 U/L (ref 13–60)
LYMPHOCYTES ABSOLUTE: 1.2 K/UL (ref 1–5.1)
LYMPHOCYTES RELATIVE PERCENT: 8.6 %
MCH RBC QN AUTO: 32.3 PG (ref 26–34)
MCHC RBC AUTO-ENTMCNC: 33 G/DL (ref 31–36)
MCV RBC AUTO: 97.9 FL (ref 80–100)
MICROSCOPIC EXAMINATION: YES
MONOCYTES ABSOLUTE: 1 K/UL (ref 0–1.3)
MONOCYTES RELATIVE PERCENT: 7.2 %
NEUTROPHILS ABSOLUTE: 11.7 K/UL (ref 1.7–7.7)
NEUTROPHILS RELATIVE PERCENT: 83.6 %
NITRITE, URINE: NEGATIVE
PDW BLD-RTO: 17.2 % (ref 12.4–15.4)
PH UA: 5.5 (ref 5–8)
PLATELET # BLD: 196 K/UL (ref 135–450)
PMV BLD AUTO: 7.5 FL (ref 5–10.5)
POTASSIUM REFLEX MAGNESIUM: 4 MMOL/L (ref 3.5–5.1)
PRO-BNP: 502 PG/ML (ref 0–124)
PROTEIN UA: NEGATIVE MG/DL
PROTHROMBIN TIME: 15.8 SEC (ref 9.9–12.7)
RBC # BLD: 3.66 M/UL (ref 4.2–5.9)
RBC UA: 1 /HPF (ref 0–4)
SARS-COV-2, NAAT: NOT DETECTED
SODIUM BLD-SCNC: 130 MMOL/L (ref 136–145)
SPECIFIC GRAVITY UA: 1.02 (ref 1–1.03)
TOTAL PROTEIN: 6.3 G/DL (ref 6.4–8.2)
TROPONIN: <0.01 NG/ML
URINE REFLEX TO CULTURE: ABNORMAL
URINE TYPE: ABNORMAL
UROBILINOGEN, URINE: 0.2 E.U./DL
WBC # BLD: 13.9 K/UL (ref 4–11)
WBC UA: 3 /HPF (ref 0–5)

## 2021-12-05 PROCEDURE — 84484 ASSAY OF TROPONIN QUANT: CPT

## 2021-12-05 PROCEDURE — 93005 ELECTROCARDIOGRAM TRACING: CPT | Performed by: EMERGENCY MEDICINE

## 2021-12-05 PROCEDURE — 1200000000 HC SEMI PRIVATE

## 2021-12-05 PROCEDURE — 85610 PROTHROMBIN TIME: CPT

## 2021-12-05 PROCEDURE — 80048 BASIC METABOLIC PNL TOTAL CA: CPT

## 2021-12-05 PROCEDURE — 36415 COLL VENOUS BLD VENIPUNCTURE: CPT

## 2021-12-05 PROCEDURE — 96366 THER/PROPH/DIAG IV INF ADDON: CPT

## 2021-12-05 PROCEDURE — 87040 BLOOD CULTURE FOR BACTERIA: CPT

## 2021-12-05 PROCEDURE — 83690 ASSAY OF LIPASE: CPT

## 2021-12-05 PROCEDURE — 2580000003 HC RX 258: Performed by: EMERGENCY MEDICINE

## 2021-12-05 PROCEDURE — 6360000002 HC RX W HCPCS: Performed by: EMERGENCY MEDICINE

## 2021-12-05 PROCEDURE — 99284 EMERGENCY DEPT VISIT MOD MDM: CPT

## 2021-12-05 PROCEDURE — 83880 ASSAY OF NATRIURETIC PEPTIDE: CPT

## 2021-12-05 PROCEDURE — 74018 RADEX ABDOMEN 1 VIEW: CPT

## 2021-12-05 PROCEDURE — 83605 ASSAY OF LACTIC ACID: CPT

## 2021-12-05 PROCEDURE — 87635 SARS-COV-2 COVID-19 AMP PRB: CPT

## 2021-12-05 PROCEDURE — 71045 X-RAY EXAM CHEST 1 VIEW: CPT

## 2021-12-05 PROCEDURE — 6360000004 HC RX CONTRAST MEDICATION: Performed by: EMERGENCY MEDICINE

## 2021-12-05 PROCEDURE — 2580000003 HC RX 258: Performed by: FAMILY MEDICINE

## 2021-12-05 PROCEDURE — 81001 URINALYSIS AUTO W/SCOPE: CPT

## 2021-12-05 PROCEDURE — 85025 COMPLETE CBC W/AUTO DIFF WBC: CPT

## 2021-12-05 PROCEDURE — 74177 CT ABD & PELVIS W/CONTRAST: CPT

## 2021-12-05 PROCEDURE — 80076 HEPATIC FUNCTION PANEL: CPT

## 2021-12-05 PROCEDURE — 96365 THER/PROPH/DIAG IV INF INIT: CPT

## 2021-12-05 PROCEDURE — 96375 TX/PRO/DX INJ NEW DRUG ADDON: CPT

## 2021-12-05 RX ORDER — 0.9 % SODIUM CHLORIDE 0.9 %
30 INTRAVENOUS SOLUTION INTRAVENOUS ONCE
Status: COMPLETED | OUTPATIENT
Start: 2021-12-05 | End: 2021-12-05

## 2021-12-05 RX ORDER — ACETAMINOPHEN 325 MG/1
650 TABLET ORAL EVERY 6 HOURS PRN
Status: DISCONTINUED | OUTPATIENT
Start: 2021-12-05 | End: 2021-12-17 | Stop reason: HOSPADM

## 2021-12-05 RX ORDER — MORPHINE SULFATE 4 MG/ML
4 INJECTION, SOLUTION INTRAMUSCULAR; INTRAVENOUS
Status: DISCONTINUED | OUTPATIENT
Start: 2021-12-05 | End: 2021-12-05 | Stop reason: ALTCHOICE

## 2021-12-05 RX ORDER — ACETAMINOPHEN 650 MG/1
650 SUPPOSITORY RECTAL EVERY 6 HOURS PRN
Status: DISCONTINUED | OUTPATIENT
Start: 2021-12-05 | End: 2021-12-17 | Stop reason: HOSPADM

## 2021-12-05 RX ORDER — SODIUM CHLORIDE 0.9 % (FLUSH) 0.9 %
5-40 SYRINGE (ML) INJECTION EVERY 12 HOURS SCHEDULED
Status: DISCONTINUED | OUTPATIENT
Start: 2021-12-05 | End: 2021-12-17 | Stop reason: HOSPADM

## 2021-12-05 RX ORDER — LORAZEPAM 2 MG/ML
0.5 INJECTION INTRAMUSCULAR EVERY 6 HOURS PRN
Status: DISCONTINUED | OUTPATIENT
Start: 2021-12-05 | End: 2021-12-17 | Stop reason: HOSPADM

## 2021-12-05 RX ORDER — ONDANSETRON 4 MG/1
4 TABLET, ORALLY DISINTEGRATING ORAL EVERY 8 HOURS PRN
Status: DISCONTINUED | OUTPATIENT
Start: 2021-12-05 | End: 2021-12-17 | Stop reason: HOSPADM

## 2021-12-05 RX ORDER — ONDANSETRON 2 MG/ML
4 INJECTION INTRAMUSCULAR; INTRAVENOUS EVERY 6 HOURS PRN
Status: DISCONTINUED | OUTPATIENT
Start: 2021-12-05 | End: 2021-12-17 | Stop reason: HOSPADM

## 2021-12-05 RX ORDER — SODIUM CHLORIDE 0.9 % (FLUSH) 0.9 %
5-40 SYRINGE (ML) INJECTION PRN
Status: DISCONTINUED | OUTPATIENT
Start: 2021-12-05 | End: 2021-12-17 | Stop reason: HOSPADM

## 2021-12-05 RX ORDER — SODIUM CHLORIDE 9 MG/ML
INJECTION, SOLUTION INTRAVENOUS CONTINUOUS
Status: DISCONTINUED | OUTPATIENT
Start: 2021-12-05 | End: 2021-12-09

## 2021-12-05 RX ORDER — MORPHINE SULFATE 2 MG/ML
2 INJECTION, SOLUTION INTRAMUSCULAR; INTRAVENOUS EVERY 4 HOURS PRN
Status: DISCONTINUED | OUTPATIENT
Start: 2021-12-05 | End: 2021-12-07

## 2021-12-05 RX ORDER — SODIUM CHLORIDE 9 MG/ML
25 INJECTION, SOLUTION INTRAVENOUS PRN
Status: DISCONTINUED | OUTPATIENT
Start: 2021-12-05 | End: 2021-12-17 | Stop reason: HOSPADM

## 2021-12-05 RX ORDER — POLYETHYLENE GLYCOL 3350 17 G/17G
17 POWDER, FOR SOLUTION ORAL DAILY PRN
Status: DISCONTINUED | OUTPATIENT
Start: 2021-12-05 | End: 2021-12-17 | Stop reason: HOSPADM

## 2021-12-05 RX ORDER — ONDANSETRON 2 MG/ML
4 INJECTION INTRAMUSCULAR; INTRAVENOUS
Status: DISCONTINUED | OUTPATIENT
Start: 2021-12-05 | End: 2021-12-05 | Stop reason: ALTCHOICE

## 2021-12-05 RX ADMIN — ONDANSETRON 4 MG: 2 INJECTION INTRAMUSCULAR; INTRAVENOUS at 16:43

## 2021-12-05 RX ADMIN — Medication 10 ML: at 22:36

## 2021-12-05 RX ADMIN — IOPAMIDOL 75 ML: 755 INJECTION, SOLUTION INTRAVENOUS at 17:31

## 2021-12-05 RX ADMIN — SODIUM CHLORIDE: 9 INJECTION, SOLUTION INTRAVENOUS at 22:35

## 2021-12-05 RX ADMIN — SODIUM CHLORIDE 2439 ML: 9 INJECTION, SOLUTION INTRAVENOUS at 16:46

## 2021-12-05 RX ADMIN — PIPERACILLIN AND TAZOBACTAM 3375 MG: 3; .375 INJECTION, POWDER, LYOPHILIZED, FOR SOLUTION INTRAVENOUS at 16:59

## 2021-12-05 RX ADMIN — IOHEXOL 50 ML: 240 INJECTION, SOLUTION INTRATHECAL; INTRAVASCULAR; INTRAVENOUS; ORAL at 17:31

## 2021-12-05 RX ADMIN — MORPHINE SULFATE 4 MG: 4 INJECTION INTRAVENOUS at 16:43

## 2021-12-05 ASSESSMENT — PAIN SCALES - GENERAL
PAINLEVEL_OUTOF10: 5
PAINLEVEL_OUTOF10: 0
PAINLEVEL_OUTOF10: 4

## 2021-12-05 ASSESSMENT — PAIN DESCRIPTION - LOCATION: LOCATION: ABDOMEN

## 2021-12-05 ASSESSMENT — PAIN DESCRIPTION - DESCRIPTORS: DESCRIPTORS: DULL;ACHING

## 2021-12-05 ASSESSMENT — PAIN DESCRIPTION - PROGRESSION: CLINICAL_PROGRESSION: NOT CHANGED

## 2021-12-05 ASSESSMENT — PAIN - FUNCTIONAL ASSESSMENT: PAIN_FUNCTIONAL_ASSESSMENT: ACTIVITIES ARE NOT PREVENTED

## 2021-12-05 ASSESSMENT — PAIN DESCRIPTION - PAIN TYPE: TYPE: CHRONIC PAIN;ACUTE PAIN

## 2021-12-05 ASSESSMENT — PAIN DESCRIPTION - ONSET: ONSET: ON-GOING

## 2021-12-05 ASSESSMENT — PAIN DESCRIPTION - FREQUENCY: FREQUENCY: INTERMITTENT

## 2021-12-05 NOTE — ED TRIAGE NOTES
Pt states he has had belly pain since last thursday, and also vomiting. Feels like he is constipated as well.  Hx colon cancer

## 2021-12-05 NOTE — ED PROVIDER NOTES
[facility] Emergency Department    CHIEF COMPLAINT  Chief Complaint   Patient presents with    Abdominal Pain     Pt states he has had belly pain since last thursday, and also vomiting. Feels like he is constipated as well. Hx colon cancer      HISTORY OF PRESENT ILLNESS  Lusi Wilson is a 57 y.o. male  who presents to the ED complaining of chief complaint of constipation. Admitted from November 29 till December 2 for ileus and small bowel obstruction thought to be secondary to a colonic mass. He has a history of partial colectomy in the past.  No longer on chemotherapy due to side effect profile according to admission oncology notes. His obstruction was treated nonsurgically/conversavitely and subsequent small bowel follow-through study showed findings later consistent with enteritis/ileus and no obstruction or anastomotic stricture. Since discharge, patient reports minimal flatus and minimal bowel movements with ongoing nausea vomiting and reports 16 pounds of weight gain since Thursday unintentionally. He denies any fevers. He has a chronic stable cough and denies it being worse than normal.  He does feel bloated in his abdomen. He reports generalized nonfocal abdominal pains. No urinary complaints. No other complaints, modifying factors or associated symptoms. I have reviewed the following from the nursing documentation.     Past Medical History:   Diagnosis Date    Anemia     Colon cancer ([address]     LAST CHEMO 2/5/18    Hypertension     Lung nodule     metastatic      Past Surgical History:   Procedure Laterality Date    COLONOSCOPY      CYST REMOVAL  1973    Behind right knee    LIVER BIOPSY Right 10/05/2017    [facility]en MD [facility] in specials as outpt    OTHER SURGICAL HISTORY  03/19/2018     Robotic assisted converted to open right colectomy with anastomosis, greater omental vascularized pedicled flap creation    THORACOSCOPY Right 04/16/2018    RIGHT VIDEO ASSISTED THORACOSCOPY WITH WEDGE EXCISION RIGHT    TUNNELED VENOUS PORT PLACEMENT Left 05/30/2017    UPPER GASTROINTESTINAL ENDOSCOPY      WISDOM TOOTH EXTRACTION       Family History   Problem Relation Age of Onset    Cancer Father         Skin (melanoma)    Mult Sclerosis Mother     No Known Problems Brother      Social History     Socioeconomic History    Marital status: Single     Spouse name: Not on file    Number of children: Not on file    Years of education: Not on file    Highest education level: Not on file   Occupational History    Occupation:    Tobacco Use    Smoking status: Never Smoker    Smokeless tobacco: Never Used   Substance and Sexual Activity    Alcohol use: Yes     Alcohol/week: 2.0 standard drinks     Types: 2 Cans of beer per week     Comment: daily     Drug use: No    Sexual activity: Not on file   Other Topics Concern    Not on file   Social History Narrative    Not on file     Social Determinants of Health     Financial Resource Strain:     Difficulty of Paying Living Expenses: Not on file   Food Insecurity:     Worried About Running Out of Food in the Last Year: Not on file    Viri of Food in the Last Year: Not on file   Transportation Needs:     Lack of Transportation (Medical): Not on file    Lack of Transportation (Non-Medical):  Not on file   Physical Activity:     Days of Exercise per Week: Not on file    Minutes of Exercise per Session: Not on file   Stress:     Feeling of Stress : Not on file   Social Connections:     Frequency of Communication with Friends and Family: Not on file    Frequency of Social Gatherings with Friends and Family: Not on file    Attends Zoroastrian Services: Not on file    Active Member of Clubs or Organizations: Not on file    Attends Club or Organization Meetings: Not on file    Marital Status: Not on file   Intimate Partner Violence:     Fear of Current or Ex-Partner: Not on file    Emotionally Abused: Not on file    Physically Abused: Not on file    Sexually Abused: Not on file   Housing Stability:     Unable to Pay for Housing in the Last Year: Not on file    Number of Maria Cmoeric in the Last Year: Not on file    Unstable Housing in the Last Year: Not on file     Current Facility-Administered Medications   Medication Dose Route Frequency Provider Last Rate Last Admin    ondansetron (ZOFRAN) injection 4 mg  4 mg IntraVENous Q1H PRN Li Gandara MD   4 mg at 12/05/21 1643    morphine injection 4 mg  4 mg IntraVENous Q1H PRN Li Gandara MD   4 mg at 12/05/21 1643    0.9 % sodium chloride bolus  30 mL/kg IntraVENous Once Li Gandara MD 1,219.5 mL/hr at 12/05/21 1646 2,439 mL at 12/05/21 1646     Current Outpatient Medications   Medication Sig Dispense Refill    ciprofloxacin (CIPRO) 500 MG tablet Take 1 tablet by mouth 2 times daily for 7 days 14 tablet 0    metroNIDAZOLE (FLAGYL) 500 MG tablet Take 1 tablet by mouth 3 times daily for 7 days 21 tablet 0    LORazepam (ATIVAN) 1 MG tablet       sodium chloride 0.9 % SOLN with fluorouracil 500 MG/10ML SOLN Infuse intravenously Over 46 hours Patient pump bag dosage reads: 4750 mg/ mL infusion at 5mL/hr.  dicyclomine (BENTYL) 10 MG capsule Take 1 capsule by mouth 3 times daily as needed (cramping) 30 capsule 3    diphenoxylate-atropine (LOMOTIL) 2.5-0.025 MG per tablet Take 2.5 mg by mouth.  Magic Mouthwash (MIRACLE MOUTHWASH) Swish and spit 5 mLs 4 times daily as needed for Irritation      lidocaine-prilocaine (EMLA) 2.5-2.5 % cream Apply a thick layer over the port 30 minutes prior to treatment and cover with saran wrap. 1 Tube 5     Allergies   Allergen Reactions    Potassium-Containing Compounds Rash       REVIEW OF SYSTEMS  10 systems reviewed, pertinent positives per HPI otherwise noted to be negative.     PHYSICAL EXAM  /85   Pulse 112   Temp 98.3 °F (36.8 °C) (Oral)   Resp 19   Ht 5' 11\" (1.803 m)   Wt 179 lb 3.2 oz (81.3 kg)   SpO2 97%   BMI 24.99 kg/m²    GENERAL APPEARANCE: Awake and alert. Cooperative. No distress. HENT: Normocephalic. Atraumatic. Mucous membranes are dry. NECK: Supple. EYES: PERRL. EOM's grossly intact. HEART/CHEST: RRR. No murmurs. No chest wall tenderness. LUNGS: Respirations unlabored. Dry cough, diminised at the bases. Good air exchange. Speaking comfortably in full sentences. ABDOMEN: Moderate diffuse distention with hypoactive bowel sounds, mild diffuse nonfocal tenderness, no palpable masses, no peritonitis, abdomen not rigid. MUSCULOSKELETAL: 3+ bilat lower extremity edema. Compartments soft. No deformity. No tenderness in the extremities. All extremities neurovascularly intact. SKIN: Warm and dry. No acute rashes. NEUROLOGICAL: Alert and oriented. CN's 2-12 intact. No gross facial drooping. Strength 5/5, sensation intact. 2 plus DTR's in knees bilaterally. Gait normal.  PSYCHIATRIC: Normal mood and affect. LABS  I have reviewed all labs for this visit.    Results for orders placed or performed during the hospital encounter of 12/05/21   CBC auto differential   Result Value Ref Range    WBC 13.9 (H) 4.0 - 11.0 K/uL    RBC 3.66 (L) 4.20 - 5.90 M/uL    Hemoglobin 11.8 (L) 13.5 - 17.5 g/dL    Hematocrit 35.8 (L) 40.5 - 52.5 %    MCV 97.9 80.0 - 100.0 fL    MCH 32.3 26.0 - 34.0 pg    MCHC 33.0 31.0 - 36.0 g/dL    RDW 17.2 (H) 12.4 - 15.4 %    Platelets 728 791 - 368 K/uL    MPV 7.5 5.0 - 10.5 fL    Neutrophils % 83.6 %    Lymphocytes % 8.6 %    Monocytes % 7.2 %    Eosinophils % 0.2 %    Basophils % 0.4 %    Neutrophils Absolute 11.7 (H) 1.7 - 7.7 K/uL    Lymphocytes Absolute 1.2 1.0 - 5.1 K/uL    Monocytes Absolute 1.0 0.0 - 1.3 K/uL    Eosinophils Absolute 0.0 0.0 - 0.6 K/uL    Basophils Absolute 0.1 0.0 - 0.2 K/uL   Basic Metabolic Panel w/ Reflex to MG   Result Value Ref Range    Sodium 130 (L) 136 - 145 mmol/L    Potassium reflex Magnesium 4.0 3.5 - 5.1 mmol/L review. Dose modulation, iterative reconstruction, and/or weight based adjustment of the mA/kV was utilized to reduce the radiation dose to as low as reasonably achievable. COMPARISON: 10/07/2021 HISTORY: ORDERING SYSTEM PROVIDED HISTORY: sbo TECHNOLOGIST PROVIDED HISTORY: Reason for exam:->sbo Additional Contrast?->None Decision Support Exception - unselect if not a suspected or confirmed emergency medical condition->Emergency Medical Condition (MA) Reason for Exam: SBO. Other (Patient states he was at the doctor earlier today and they found a small bowel obstruction and was told to come to ER because they were concerned about it. ). Acuity: Acute Type of Exam: Initial FINDINGS: Lower Chest: Previously seen right lower lobe mass is obscured by partial right lower lobe collapse. There is also middle lobe atelectasis. There is a small right pleural effusion. Organs: There is fatty infiltration of the liver. The low-attenuation mass in the left hepatic lobe is essentially unchanged at 2.7 x 3.5 cm. Low-attenuation lesions at the dome of the liver are grossly stable. The spleen is enlarged. The pancreas, gallbladder, adrenal glands and kidneys are unremarkable. GI/Bowel: Small bowel is mildly dilated with diffuse small bowel wall thickening. Small bowel is dilated to the level of the ileocolic anastomosis. The patient is status post right hemicolectomy. Remaining colon is unremarkable. Pelvis: The bladder is grossly negative. Peritoneum/Retroperitoneum: There is a large volume of ascites throughout the abdomen and pelvis with diffuse mesenteric stranding. Partially calcified periportal lymph nodes are unchanged. The aorta is unremarkable. Bones/Soft Tissues: No acute finding or suspicious bone lesion. There is degenerative disc disease at L5-S1.     1. Dilation of small bowel to the level of the ileocolic anastomosis with small bowel wall thickening could represent small bowel obstruction or ileus.  2. Large volume of ascites. 3. Right lower lobe mass is partially obscured by right lower lobe atelectasis. 4. No significant change in the size of the liver lesions. XR CHEST PORTABLE    Result Date: 12/5/2021  EXAMINATION: ONE XRAY VIEW OF THE CHEST 12/5/2021 3:46 pm COMPARISON: 07/02/2020 HISTORY: ORDERING SYSTEM PROVIDED HISTORY: cough, fluid overload TECHNOLOGIST PROVIDED HISTORY: Reason for exam:->cough, fluid overload Reason for Exam: Abdominal Pain (Pt states he has had belly pain since last thursday, and also vomiting. Feels like he is constipated as well. Hx colon cancer) FINDINGS: Left-sided central venous catheter remains in place. Cardiomediastinal silhouette stable. New right parahilar airspace opacity. No pneumothorax. No pleural effusion. New right parahilar airspace opacity suggesting pneumonia     XR ABDOMEN FOR NG/OG/NE TUBE PLACEMENT    Result Date: 11/30/2021  EXAMINATION: ONE SUPINE XRAY VIEW(S) OF THE ABDOMEN 11/30/2021 12:50 pm COMPARISON: None. HISTORY: ORDERING SYSTEM PROVIDED HISTORY: Confirmation of course of NG/OG/NE tube and location of tip of tube TECHNOLOGIST PROVIDED HISTORY: Reason for exam:->Confirmation of course of NG/OG/NE tube and location of tip of tube Portable? ->Yes Reason for Exam: Other (Patient states he was at the doctor earlier today and they found a small bowel obstruction and was told to come to ER because they were concerned about it. ) FINDINGS: There is a large bore enteric tube in place, tip is in Georgie pyloric position. There is some interval decrease in dilation of bowel loops in the left upper quadrant. The remainder the abdomen remains gasless. Enteric tube in place as above. Sign report     FL SMALL BOWEL FOLLOW THROUGH ONLY    Result Date: 12/1/2021  EXAMINATION: SMALL BOWEL FOLLOW THROUGH SERIES 12/1/2021 TECHNIQUE: Small bowel follow through series was performed with overhead images and spot images.  FLUOROSCOPY DOSE AND TYPE OR TIME AND EXPOSURES: Fluoroscopy was not used for the study. COMPARISON: CT abdomen/pelvis 11/29/2021 HISTORY: ORDERING SYSTEM PROVIDED HISTORY: SBO, possible anastomotic stricture? water soluble contrast to be given through NG if present TECHNOLOGIST PROVIDED HISTORY: Reason for exam:->SBO, possible anastomotic stricture? water soluble contrast to be given through NG if present Reason for Exam: SBO, possible anastomotic stricture? water soluble contrast to be given through NG if present Acuity: Unknown Type of Exam: Initial FINDINGS: An initial  view of the abdomen and pelvis was performed which demonstrates a NG tube overlying the region of the gastric antrum. There is surgical material from prior right colonic resection. Then, water-soluble contrast was instilled into the NG tube, and overhead images of the small bowel were performed. The stomach is normal in appearance. There is normal transit time of contrast from the stomach to the colon, with contrast reaching the colon at approximately 30 minutes. However, there is pan dilatation of multiple small bowel loops throughout the abdomen, with wall thickening involving mid to distal small bowel loops. This most likely reflects a nonspecific enteritis leading to a diffuse small bowel ileus. There is no evidence of transition point to suggest bowel obstruction. There is no evidence of a small bowel filling defect, stricture, mass, or ulcer. There has been partial right colectomy. The visualized portion of the colon is grossly unremarkable, with the final images demonstrating retained contrast within the rectum. Patient status post partial right colectomy, with the bowel gas pattern most consistent with an underlying small bowel enteritis leading to a diffuse small bowel ileus. There is no evidence of small-bowel obstruction and no evidence to suggest an anastomotic stricture. ED COURSE/MDM  Patient seen and evaluated. Old records reviewed.  Labs and imaging reviewed and results discussed with patient. After initial evaluation, differential diagnostic considerations included: kidney stone, pyelonephritis, UTI, appendicitis, bowel obstruction, diverticulitis, hernia, gastritis/gastroenteritis, pancreatitis, cholecystitis, hepatitis, constipation, IBS, IBD    The patient's ED workup was notable for concern for small bowel obstruction, with this being evidence as a worsening condition for the patient on repeat CT imaging today. Also is concerning for some pneumonia likely aspiration based on the clinical presentation. The patient does have a cough with basilar infiltrate, leukocytosis and lactate elevation. Surgery will be consulted, NG tube placed and admission to the hospitalist.  Patient was given Zosyn. Sepsis protocols initiated in the ED    Dr. Johnson Player in agreement with plan, asked for pre-op rapid COVID test as well which I ordered. SEP-1 CORE MEASURE DATA      Sepsis Criteria   Severe Sepsis Criteria   Septic Shock Criteria     Must be confirmed or suspected to move forward with diagnosis of sepsis. Must meet 2:    [] Temperature > 100.9 F (38.3 C)        or < 96.8 F (36 C)  [x] HR > 90  [] RR > 20  [x] WBC > 12 or < 4 or 10% bands    AND:    [x] Infection Confirmed or        Suspected.     OR:    [] Exclude from SEP-1 because:    [] No infection present or suspected   [] May have sepsis, but does not meet criteria for severe sepsis or septic shock  [] Alternative explanation for abnormal labs and/or vitals (see MDM)  [] Viral etiology found or highly suspected (including COVID-19) without concomitant bacterial infection   Must meet 1:    [x] Lactate > 2       or   [] Signs of Organ Dysfunction:    - SBP < 90 or MAP < 65  - Altered mental status  - Creatinine > 2 or increased from      baseline  - Urine Output < 0.5 ml/kg/hr  - Bilirubin > 2  - INR > 1.5 (not anticoagulated)  - Platelets < 819,438  - Acute Respiratory Failure as     evidenced by new need for NIPPV     or mechanical ventilation        [] No criteria met for Severe Sepsis. Must meet 1:    [] Lactate > 4        or   [] SBP < 90 or MAP < 65 for at        least two readings in the first        hour after fluid bolus        administration      [] Vasopressors initiated (if hypotension persists after fluid resuscitation)                [x] No criteria met for Septic Shock. Patient Vitals for the past 6 hrs:   BP Temp Pulse Resp SpO2 Height Weight Weight Method Percent Weight Change   12/05/21 1540 111/85 98.3 °F (36.8 °C) 112 19 97 % 5' 11\" (1.803 m) 179 lb 3.2 oz (81.3 kg) Actual 0      Recent Labs     12/05/21  1557   WBC 13.9*   LACTA 2.3*   CREATININE 0.9   BILITOT 1.3*   INR 1.38*            Sepsis Time Identified: 5:30pm    Fluid Resuscitation Rational: at least 30mL/kg based on entered actual weight at time of triage    Infection Source: Pulmonary (aspiration)    Repeat lactate level: pending    Reassessment Exam:   I have reassessed tissue perfusion and hemodynamic status after fluid bolus      During the patient's ED course, the patient was given:  Medications   ondansetron (ZOFRAN) injection 4 mg (4 mg IntraVENous Given 12/5/21 1643)   morphine injection 4 mg (4 mg IntraVENous Given 12/5/21 1643)   0.9 % sodium chloride bolus (2,439 mLs IntraVENous New Bag 12/5/21 1646)   iohexol (OMNIPAQUE 240) injection 50 mL (50 mLs Oral Given 12/5/21 1731)   iopamidol (ISOVUE-370) 76 % injection 75 mL (75 mLs IntraVENous Given 12/5/21 1731)   piperacillin-tazobactam (ZOSYN) 3,375 mg in dextrose 5 % 50 mL IVPB (mini-bag) (3,375 mg IntraVENous New Bag 12/5/21 1659)        CLINICAL IMPRESSION  1. SBO (small bowel obstruction) ([address]    2. Pneumonia due to infectious organism, unspecified laterality, unspecified part of lung    3. Severe sepsis (HCC)        Blood pressure 111/85, pulse 112, temperature 98.3 °F (36.8 °C), temperature source Oral, resp.  rate 19, height 5' 11\" (1.803 m), weight 179 lb 3.2 oz (81.3 kg), SpO2 97 %. Stevan Gaines was admitted in fair condition. The plan is to admit to the hospital at this time under the hospitalist service. Hospitalist accepted the patient and will take over the patient's care. The total critical care time spent while evaluating and treating this patient was 40 minutes. This excludes time spent doing separately billable procedures. This includes time at the bedside, data interpretation, medication management, obtaining critical history from collateral sources if the patient is unable to provide it directly, and physician consultation. Specifics of interventions taken and potentially life-threatening diagnostic considerations are listed above in the medical decision making. DISCLAIMER: This chart was created using Dragon dictation software. Efforts were made by me to ensure accuracy, however some errors may be present due to limitations of this technology and occasionally words are not transcribed correctly.         Lucila Bautista MD  12/05/21 3487       Lucila Bautista MD  12/05/21 3410

## 2021-12-06 ENCOUNTER — ANESTHESIA (OUTPATIENT)
Dept: OPERATING ROOM | Age: 61
DRG: 329 | End: 2021-12-06
Payer: COMMERCIAL

## 2021-12-06 ENCOUNTER — ANESTHESIA EVENT (OUTPATIENT)
Dept: OPERATING ROOM | Age: 61
DRG: 329 | End: 2021-12-06
Payer: COMMERCIAL

## 2021-12-06 ENCOUNTER — PATIENT MESSAGE (OUTPATIENT)
Dept: FAMILY MEDICINE CLINIC | Age: 61
End: 2021-12-06

## 2021-12-06 VITALS
RESPIRATION RATE: 2 BRPM | TEMPERATURE: 94.6 F | OXYGEN SATURATION: 100 % | SYSTOLIC BLOOD PRESSURE: 126 MMHG | DIASTOLIC BLOOD PRESSURE: 88 MMHG

## 2021-12-06 LAB
ANION GAP SERPL CALCULATED.3IONS-SCNC: 7 MMOL/L (ref 3–16)
BASOPHILS ABSOLUTE: 0 K/UL (ref 0–0.2)
BASOPHILS RELATIVE PERCENT: 0.4 %
BUN BLDV-MCNC: 8 MG/DL (ref 7–20)
CALCIUM SERPL-MCNC: 7.6 MG/DL (ref 8.3–10.6)
CHLORIDE BLD-SCNC: 99 MMOL/L (ref 99–110)
CO2: 27 MMOL/L (ref 21–32)
CREAT SERPL-MCNC: 0.8 MG/DL (ref 0.8–1.3)
EOSINOPHILS ABSOLUTE: 0.1 K/UL (ref 0–0.6)
EOSINOPHILS RELATIVE PERCENT: 1.3 %
GFR AFRICAN AMERICAN: >60
GFR NON-AFRICAN AMERICAN: >60
GLUCOSE BLD-MCNC: 80 MG/DL (ref 70–99)
HCT VFR BLD CALC: 28.8 % (ref 40.5–52.5)
HEMOGLOBIN: 9.6 G/DL (ref 13.5–17.5)
LYMPHOCYTES ABSOLUTE: 0.7 K/UL (ref 1–5.1)
LYMPHOCYTES RELATIVE PERCENT: 10.1 %
MCH RBC QN AUTO: 33.1 PG (ref 26–34)
MCHC RBC AUTO-ENTMCNC: 33.5 G/DL (ref 31–36)
MCV RBC AUTO: 98.8 FL (ref 80–100)
MONOCYTES ABSOLUTE: 0.7 K/UL (ref 0–1.3)
MONOCYTES RELATIVE PERCENT: 9.6 %
NEUTROPHILS ABSOLUTE: 5.6 K/UL (ref 1.7–7.7)
NEUTROPHILS RELATIVE PERCENT: 78.6 %
PDW BLD-RTO: 17.3 % (ref 12.4–15.4)
PLATELET # BLD: 125 K/UL (ref 135–450)
PMV BLD AUTO: 7.8 FL (ref 5–10.5)
POTASSIUM REFLEX MAGNESIUM: 3.7 MMOL/L (ref 3.5–5.1)
PROCALCITONIN: 0.31 NG/ML (ref 0–0.15)
RBC # BLD: 2.91 M/UL (ref 4.2–5.9)
SODIUM BLD-SCNC: 133 MMOL/L (ref 136–145)
WBC # BLD: 7.1 K/UL (ref 4–11)

## 2021-12-06 PROCEDURE — 7100000000 HC PACU RECOVERY - FIRST 15 MIN: Performed by: SURGERY

## 2021-12-06 PROCEDURE — 7100000001 HC PACU RECOVERY - ADDTL 15 MIN: Performed by: SURGERY

## 2021-12-06 PROCEDURE — 88309 TISSUE EXAM BY PATHOLOGIST: CPT

## 2021-12-06 PROCEDURE — 94761 N-INVAS EAR/PLS OXIMETRY MLT: CPT

## 2021-12-06 PROCEDURE — 3600000014 HC SURGERY LEVEL 4 ADDTL 15MIN: Performed by: SURGERY

## 2021-12-06 PROCEDURE — 6360000002 HC RX W HCPCS: Performed by: FAMILY MEDICINE

## 2021-12-06 PROCEDURE — C9290 INJ, BUPIVACAINE LIPOSOME: HCPCS | Performed by: SURGERY

## 2021-12-06 PROCEDURE — 2580000003 HC RX 258: Performed by: FAMILY MEDICINE

## 2021-12-06 PROCEDURE — 3600000004 HC SURGERY LEVEL 4 BASE: Performed by: SURGERY

## 2021-12-06 PROCEDURE — 2720000010 HC SURG SUPPLY STERILE: Performed by: SURGERY

## 2021-12-06 PROCEDURE — 36415 COLL VENOUS BLD VENIPUNCTURE: CPT

## 2021-12-06 PROCEDURE — P9045 ALBUMIN (HUMAN), 5%, 250 ML: HCPCS | Performed by: NURSE ANESTHETIST, CERTIFIED REGISTERED

## 2021-12-06 PROCEDURE — 2580000003 HC RX 258: Performed by: PHYSICIAN ASSISTANT

## 2021-12-06 PROCEDURE — APPNB30 APP NON BILLABLE TIME 0-30 MINS: Performed by: NURSE PRACTITIONER

## 2021-12-06 PROCEDURE — 6370000000 HC RX 637 (ALT 250 FOR IP): Performed by: SURGERY

## 2021-12-06 PROCEDURE — 99222 1ST HOSP IP/OBS MODERATE 55: CPT | Performed by: SURGERY

## 2021-12-06 PROCEDURE — 88341 IMHCHEM/IMCYTCHM EA ADD ANTB: CPT

## 2021-12-06 PROCEDURE — 0DBU0ZZ EXCISION OF OMENTUM, OPEN APPROACH: ICD-10-PCS | Performed by: SURGERY

## 2021-12-06 PROCEDURE — 2700000000 HC OXYGEN THERAPY PER DAY

## 2021-12-06 PROCEDURE — 1200000000 HC SEMI PRIVATE

## 2021-12-06 PROCEDURE — 3700000001 HC ADD 15 MINUTES (ANESTHESIA): Performed by: SURGERY

## 2021-12-06 PROCEDURE — 88342 IMHCHEM/IMCYTCHM 1ST ANTB: CPT

## 2021-12-06 PROCEDURE — 6360000002 HC RX W HCPCS: Performed by: ANESTHESIOLOGY

## 2021-12-06 PROCEDURE — 84145 PROCALCITONIN (PCT): CPT

## 2021-12-06 PROCEDURE — 2500000003 HC RX 250 WO HCPCS: Performed by: SURGERY

## 2021-12-06 PROCEDURE — 6360000002 HC RX W HCPCS: Performed by: SURGERY

## 2021-12-06 PROCEDURE — 85025 COMPLETE CBC W/AUTO DIFF WBC: CPT

## 2021-12-06 PROCEDURE — 2709999900 HC NON-CHARGEABLE SUPPLY: Performed by: SURGERY

## 2021-12-06 PROCEDURE — 44160 REMOVAL OF COLON: CPT | Performed by: SURGERY

## 2021-12-06 PROCEDURE — 2500000003 HC RX 250 WO HCPCS: Performed by: ANESTHESIOLOGY

## 2021-12-06 PROCEDURE — 6360000002 HC RX W HCPCS: Performed by: NURSE ANESTHETIST, CERTIFIED REGISTERED

## 2021-12-06 PROCEDURE — 2580000003 HC RX 258: Performed by: SURGERY

## 2021-12-06 PROCEDURE — 3700000000 HC ANESTHESIA ATTENDED CARE: Performed by: SURGERY

## 2021-12-06 PROCEDURE — 0DBB0ZZ EXCISION OF ILEUM, OPEN APPROACH: ICD-10-PCS | Performed by: SURGERY

## 2021-12-06 PROCEDURE — 80048 BASIC METABOLIC PNL TOTAL CA: CPT

## 2021-12-06 PROCEDURE — 2500000003 HC RX 250 WO HCPCS: Performed by: NURSE ANESTHETIST, CERTIFIED REGISTERED

## 2021-12-06 RX ORDER — KETAMINE HCL IN NACL, ISO-OSM 100MG/10ML
SYRINGE (ML) INJECTION PRN
Status: DISCONTINUED | OUTPATIENT
Start: 2021-12-06 | End: 2021-12-06 | Stop reason: SDUPTHER

## 2021-12-06 RX ORDER — HYDROMORPHONE HCL 110MG/55ML
PATIENT CONTROLLED ANALGESIA SYRINGE INTRAVENOUS PRN
Status: DISCONTINUED | OUTPATIENT
Start: 2021-12-06 | End: 2021-12-06 | Stop reason: SDUPTHER

## 2021-12-06 RX ORDER — PROPOFOL 10 MG/ML
INJECTION, EMULSION INTRAVENOUS PRN
Status: DISCONTINUED | OUTPATIENT
Start: 2021-12-06 | End: 2021-12-06 | Stop reason: SDUPTHER

## 2021-12-06 RX ORDER — ALBUMIN, HUMAN INJ 5% 5 %
SOLUTION INTRAVENOUS PRN
Status: DISCONTINUED | OUTPATIENT
Start: 2021-12-06 | End: 2021-12-06 | Stop reason: SDUPTHER

## 2021-12-06 RX ORDER — ONDANSETRON 2 MG/ML
INJECTION INTRAMUSCULAR; INTRAVENOUS PRN
Status: DISCONTINUED | OUTPATIENT
Start: 2021-12-06 | End: 2021-12-06 | Stop reason: SDUPTHER

## 2021-12-06 RX ORDER — MEPERIDINE HYDROCHLORIDE 25 MG/ML
12.5 INJECTION INTRAMUSCULAR; INTRAVENOUS; SUBCUTANEOUS EVERY 5 MIN PRN
Status: DISCONTINUED | OUTPATIENT
Start: 2021-12-06 | End: 2021-12-06 | Stop reason: HOSPADM

## 2021-12-06 RX ORDER — ACETAMINOPHEN 650 MG
TABLET, EXTENDED RELEASE ORAL
Status: COMPLETED | OUTPATIENT
Start: 2021-12-06 | End: 2021-12-06

## 2021-12-06 RX ORDER — ROCURONIUM BROMIDE 10 MG/ML
INJECTION, SOLUTION INTRAVENOUS PRN
Status: DISCONTINUED | OUTPATIENT
Start: 2021-12-06 | End: 2021-12-06 | Stop reason: SDUPTHER

## 2021-12-06 RX ORDER — ONDANSETRON 2 MG/ML
4 INJECTION INTRAMUSCULAR; INTRAVENOUS
Status: DISCONTINUED | OUTPATIENT
Start: 2021-12-06 | End: 2021-12-06 | Stop reason: HOSPADM

## 2021-12-06 RX ORDER — HYDRALAZINE HYDROCHLORIDE 20 MG/ML
5 INJECTION INTRAMUSCULAR; INTRAVENOUS EVERY 10 MIN PRN
Status: DISCONTINUED | OUTPATIENT
Start: 2021-12-06 | End: 2021-12-06 | Stop reason: HOSPADM

## 2021-12-06 RX ORDER — DEXAMETHASONE SODIUM PHOSPHATE 4 MG/ML
INJECTION, SOLUTION INTRA-ARTICULAR; INTRALESIONAL; INTRAMUSCULAR; INTRAVENOUS; SOFT TISSUE PRN
Status: DISCONTINUED | OUTPATIENT
Start: 2021-12-06 | End: 2021-12-06 | Stop reason: SDUPTHER

## 2021-12-06 RX ORDER — BUPIVACAINE HYDROCHLORIDE 5 MG/ML
INJECTION, SOLUTION EPIDURAL; INTRACAUDAL
Status: COMPLETED | OUTPATIENT
Start: 2021-12-06 | End: 2021-12-06

## 2021-12-06 RX ORDER — HYDROMORPHONE HCL 110MG/55ML
0.5 PATIENT CONTROLLED ANALGESIA SYRINGE INTRAVENOUS EVERY 5 MIN PRN
Status: DISCONTINUED | OUTPATIENT
Start: 2021-12-06 | End: 2021-12-06 | Stop reason: HOSPADM

## 2021-12-06 RX ORDER — FENTANYL CITRATE 50 UG/ML
INJECTION, SOLUTION INTRAMUSCULAR; INTRAVENOUS PRN
Status: DISCONTINUED | OUTPATIENT
Start: 2021-12-06 | End: 2021-12-06 | Stop reason: SDUPTHER

## 2021-12-06 RX ORDER — CALCIUM CHLORIDE 100 MG/ML
INJECTION INTRAVENOUS; INTRAVENTRICULAR PRN
Status: DISCONTINUED | OUTPATIENT
Start: 2021-12-06 | End: 2021-12-06 | Stop reason: SDUPTHER

## 2021-12-06 RX ORDER — LIDOCAINE HYDROCHLORIDE 20 MG/ML
INJECTION, SOLUTION EPIDURAL; INFILTRATION; INTRACAUDAL; PERINEURAL PRN
Status: DISCONTINUED | OUTPATIENT
Start: 2021-12-06 | End: 2021-12-06 | Stop reason: SDUPTHER

## 2021-12-06 RX ORDER — SUCCINYLCHOLINE/SOD CL,ISO/PF 200MG/10ML
SYRINGE (ML) INTRAVENOUS PRN
Status: DISCONTINUED | OUTPATIENT
Start: 2021-12-06 | End: 2021-12-06 | Stop reason: SDUPTHER

## 2021-12-06 RX ORDER — LABETALOL HYDROCHLORIDE 5 MG/ML
5 INJECTION, SOLUTION INTRAVENOUS EVERY 10 MIN PRN
Status: DISCONTINUED | OUTPATIENT
Start: 2021-12-06 | End: 2021-12-06 | Stop reason: HOSPADM

## 2021-12-06 RX ORDER — MIDAZOLAM HYDROCHLORIDE 1 MG/ML
INJECTION INTRAMUSCULAR; INTRAVENOUS PRN
Status: DISCONTINUED | OUTPATIENT
Start: 2021-12-06 | End: 2021-12-06 | Stop reason: SDUPTHER

## 2021-12-06 RX ORDER — MAGNESIUM HYDROXIDE 1200 MG/15ML
LIQUID ORAL CONTINUOUS PRN
Status: COMPLETED | OUTPATIENT
Start: 2021-12-06 | End: 2021-12-06

## 2021-12-06 RX ADMIN — PROPOFOL 150 MG: 10 INJECTION, EMULSION INTRAVENOUS at 17:36

## 2021-12-06 RX ADMIN — PIPERACILLIN AND TAZOBACTAM 3375 MG: 3; .375 INJECTION, POWDER, LYOPHILIZED, FOR SOLUTION INTRAVENOUS at 08:42

## 2021-12-06 RX ADMIN — ALBUMIN (HUMAN) 250 ML: 12.5 INJECTION, SOLUTION INTRAVENOUS at 17:40

## 2021-12-06 RX ADMIN — ALBUMIN (HUMAN) 250 ML: 12.5 INJECTION, SOLUTION INTRAVENOUS at 18:05

## 2021-12-06 RX ADMIN — Medication 20 MG: at 18:58

## 2021-12-06 RX ADMIN — Medication 10 ML: at 08:43

## 2021-12-06 RX ADMIN — PHENYLEPHRINE HYDROCHLORIDE 200 MCG: 10 INJECTION INTRAVENOUS at 17:38

## 2021-12-06 RX ADMIN — ROCURONIUM BROMIDE 20 MG: 10 INJECTION, SOLUTION INTRAVENOUS at 18:06

## 2021-12-06 RX ADMIN — DEXAMETHASONE SODIUM PHOSPHATE 4 MG: 4 INJECTION, SOLUTION INTRAMUSCULAR; INTRAVENOUS at 17:44

## 2021-12-06 RX ADMIN — PHENYLEPHRINE HYDROCHLORIDE 200 MCG: 10 INJECTION INTRAVENOUS at 17:53

## 2021-12-06 RX ADMIN — CALCIUM CHLORIDE 0.5 G: 100 INJECTION INTRAVENOUS; INTRAVENTRICULAR at 17:53

## 2021-12-06 RX ADMIN — FENTANYL CITRATE 50 MCG: 50 INJECTION, SOLUTION INTRAMUSCULAR; INTRAVENOUS at 18:11

## 2021-12-06 RX ADMIN — FENTANYL CITRATE 50 MCG: 50 INJECTION, SOLUTION INTRAMUSCULAR; INTRAVENOUS at 17:36

## 2021-12-06 RX ADMIN — Medication 10 ML: at 20:45

## 2021-12-06 RX ADMIN — MORPHINE SULFATE 2 MG: 2 INJECTION, SOLUTION INTRAMUSCULAR; INTRAVENOUS at 11:10

## 2021-12-06 RX ADMIN — CALCIUM CHLORIDE 0.5 G: 100 INJECTION INTRAVENOUS; INTRAVENTRICULAR at 17:42

## 2021-12-06 RX ADMIN — HYDROMORPHONE HYDROCHLORIDE 0.5 MG: 2 INJECTION, SOLUTION INTRAMUSCULAR; INTRAVENOUS; SUBCUTANEOUS at 20:22

## 2021-12-06 RX ADMIN — ROCURONIUM BROMIDE 10 MG: 10 INJECTION, SOLUTION INTRAVENOUS at 18:44

## 2021-12-06 RX ADMIN — MIDAZOLAM 2 MG: 1 INJECTION INTRAMUSCULAR; INTRAVENOUS at 17:30

## 2021-12-06 RX ADMIN — LIDOCAINE HYDROCHLORIDE 60 MG: 20 INJECTION, SOLUTION EPIDURAL; INFILTRATION; INTRACAUDAL; PERINEURAL at 17:36

## 2021-12-06 RX ADMIN — Medication 120 MG: at 17:36

## 2021-12-06 RX ADMIN — PIPERACILLIN AND TAZOBACTAM 3375 MG: 3; .375 INJECTION, POWDER, LYOPHILIZED, FOR SOLUTION INTRAVENOUS at 03:03

## 2021-12-06 RX ADMIN — ONDANSETRON 4 MG: 2 INJECTION INTRAMUSCULAR; INTRAVENOUS at 19:23

## 2021-12-06 RX ADMIN — SUGAMMADEX 200 MG: 100 INJECTION, SOLUTION INTRAVENOUS at 19:25

## 2021-12-06 RX ADMIN — SODIUM CHLORIDE: 9 INJECTION, SOLUTION INTRAVENOUS at 12:12

## 2021-12-06 RX ADMIN — Medication 10 MG: at 18:11

## 2021-12-06 RX ADMIN — PHENYLEPHRINE HYDROCHLORIDE 200 MCG: 10 INJECTION INTRAVENOUS at 17:39

## 2021-12-06 RX ADMIN — PHENYLEPHRINE HYDROCHLORIDE 200 MCG: 10 INJECTION INTRAVENOUS at 17:47

## 2021-12-06 RX ADMIN — ROCURONIUM BROMIDE 30 MG: 10 INJECTION, SOLUTION INTRAVENOUS at 17:42

## 2021-12-06 RX ADMIN — PHENYLEPHRINE HYDROCHLORIDE 200 MCG: 10 INJECTION INTRAVENOUS at 19:25

## 2021-12-06 RX ADMIN — HYDROMORPHONE HYDROCHLORIDE 0.5 MG: 2 INJECTION, SOLUTION INTRAMUSCULAR; INTRAVENOUS; SUBCUTANEOUS at 20:06

## 2021-12-06 RX ADMIN — MORPHINE SULFATE 2 MG: 2 INJECTION, SOLUTION INTRAMUSCULAR; INTRAVENOUS at 23:49

## 2021-12-06 RX ADMIN — ROCURONIUM BROMIDE 10 MG: 10 INJECTION, SOLUTION INTRAVENOUS at 18:19

## 2021-12-06 RX ADMIN — HYDROMORPHONE HYDROCHLORIDE 0.2 MG: 2 INJECTION, SOLUTION INTRAMUSCULAR; INTRAVENOUS; SUBCUTANEOUS at 18:32

## 2021-12-06 ASSESSMENT — PULMONARY FUNCTION TESTS
PIF_VALUE: 15
PIF_VALUE: 14
PIF_VALUE: 15
PIF_VALUE: 15
PIF_VALUE: 18
PIF_VALUE: 17
PIF_VALUE: 15
PIF_VALUE: 15
PIF_VALUE: 14
PIF_VALUE: 2
PIF_VALUE: 16
PIF_VALUE: 15
PIF_VALUE: 16
PIF_VALUE: 20
PIF_VALUE: 2
PIF_VALUE: 0
PIF_VALUE: 16
PIF_VALUE: 15
PIF_VALUE: 0
PIF_VALUE: 16
PIF_VALUE: 14
PIF_VALUE: 18
PIF_VALUE: 15
PIF_VALUE: 14
PIF_VALUE: 16
PIF_VALUE: 15
PIF_VALUE: 17
PIF_VALUE: 17
PIF_VALUE: 15
PIF_VALUE: 16
PIF_VALUE: 14
PIF_VALUE: 15
PIF_VALUE: 18
PIF_VALUE: 17
PIF_VALUE: 15
PIF_VALUE: 16
PIF_VALUE: 3
PIF_VALUE: 14
PIF_VALUE: 18
PIF_VALUE: 15
PIF_VALUE: 3
PIF_VALUE: 17
PIF_VALUE: 2
PIF_VALUE: 17
PIF_VALUE: 15
PIF_VALUE: 17
PIF_VALUE: 21
PIF_VALUE: 20
PIF_VALUE: 21
PIF_VALUE: 3
PIF_VALUE: 15
PIF_VALUE: 0
PIF_VALUE: 4
PIF_VALUE: 17
PIF_VALUE: 17
PIF_VALUE: 15
PIF_VALUE: 0
PIF_VALUE: 15
PIF_VALUE: 17
PIF_VALUE: 16
PIF_VALUE: 15
PIF_VALUE: 15
PIF_VALUE: 0
PIF_VALUE: 15
PIF_VALUE: 15
PIF_VALUE: 27
PIF_VALUE: 0
PIF_VALUE: 0
PIF_VALUE: 15
PIF_VALUE: 15
PIF_VALUE: 1
PIF_VALUE: 18
PIF_VALUE: 17
PIF_VALUE: 15
PIF_VALUE: 20
PIF_VALUE: 0
PIF_VALUE: 15
PIF_VALUE: 20
PIF_VALUE: 15
PIF_VALUE: 18
PIF_VALUE: 18
PIF_VALUE: 0
PIF_VALUE: 13
PIF_VALUE: 17
PIF_VALUE: 17
PIF_VALUE: 14
PIF_VALUE: 0
PIF_VALUE: 3
PIF_VALUE: 18
PIF_VALUE: 14
PIF_VALUE: 20
PIF_VALUE: 18
PIF_VALUE: 20
PIF_VALUE: 18
PIF_VALUE: 15
PIF_VALUE: 15
PIF_VALUE: 14
PIF_VALUE: 15
PIF_VALUE: 14
PIF_VALUE: 15
PIF_VALUE: 22
PIF_VALUE: 17
PIF_VALUE: 15
PIF_VALUE: 15
PIF_VALUE: 17
PIF_VALUE: 18
PIF_VALUE: 17
PIF_VALUE: 15
PIF_VALUE: 17
PIF_VALUE: 15
PIF_VALUE: 15
PIF_VALUE: 24
PIF_VALUE: 15
PIF_VALUE: 17
PIF_VALUE: 15
PIF_VALUE: 14
PIF_VALUE: 15
PIF_VALUE: 0
PIF_VALUE: 13
PIF_VALUE: 21
PIF_VALUE: 15
PIF_VALUE: 1
PIF_VALUE: 15
PIF_VALUE: 15
PIF_VALUE: 13
PIF_VALUE: 21

## 2021-12-06 ASSESSMENT — PAIN SCALES - GENERAL
PAINLEVEL_OUTOF10: 3
PAINLEVEL_OUTOF10: 7
PAINLEVEL_OUTOF10: 4
PAINLEVEL_OUTOF10: 7
PAINLEVEL_OUTOF10: 3
PAINLEVEL_OUTOF10: 3
PAINLEVEL_OUTOF10: 8

## 2021-12-06 ASSESSMENT — PAIN - FUNCTIONAL ASSESSMENT: PAIN_FUNCTIONAL_ASSESSMENT: 0-10

## 2021-12-06 ASSESSMENT — PAIN DESCRIPTION - PAIN TYPE
TYPE: ACUTE PAIN
TYPE: SURGICAL PAIN

## 2021-12-06 ASSESSMENT — PAIN DESCRIPTION - LOCATION
LOCATION: ABDOMEN
LOCATION: THROAT

## 2021-12-06 ASSESSMENT — LIFESTYLE VARIABLES: SMOKING_STATUS: 0

## 2021-12-06 NOTE — ED NOTES
Pt received in ED AAOx4, with NG tube to R nostril confirmed by x-ray to be in correct position. Tube is set to intermittent suction and draining appropriately. Presents due to belly pain from last Thursday & vomiting with constipation. At this time, pt denies any N/V. Resting comfortably at this time, fluids infusing per eMAR. Normotensive 100/82, HR in 90's, and satting 96% on RA. NAD, respirations even and unlabored. Pending admission and turnover.       Nicola Glover RN  12/05/21 1931

## 2021-12-06 NOTE — PROGRESS NOTES
4 Eyes Skin Assessment     NAME:  Herbert Middleton  YOB: 1960  MEDICAL RECORD NUMBER:  [id number]    The patient is being assess for  {Reason for Assessment:16485}    I agree that 2 RN's have performed a thorough Head to Toe Skin Assessment on the patient. ALL assessment sites listed below have been assessed. Areas assessed by both nurses:    {Pressure Areas Assessed:32100}        Does the Patient have a Wound?  {Action Wound:62076}       Humphrey Prevention initiated:  {YES/NO:19732}   Wound Care Orders initiated:  {YES/NO:19732}    Pressure Injury (Stage 3,4, Unstageable, DTI, NWPT, and Complex wounds) if present place consult order under [de-identified] {YES/NO:19732}    New and Established Ostomies if present place consult order under : {YES/NO:19732}      Nurse 1 eSignature: {Esignature:[id number]}    **SHARE this note so that the co-signing nurse is able to place an eSignature**    Nurse 2 eSignature: Electronically signed by Sixto Galarza RN on 12/6/21 at 6:40 AM EST

## 2021-12-06 NOTE — PROGRESS NOTES
[facility]    12/6/2021 10:22 AM        Name: Kyaw Latham . Admitted: 12/5/2021  Primary Care Provider: Yolanda Roque MD (Tel: [phone])      Subjective:  . Patient admitted with SBO. Was just discharged on Thursday after hospitalization for SBO. Has metastatic colon CA and followed by Jluis Quiros. No bm since Thursdays. Was vomiting on Saturday. Reports occasional productive cough. No SOB at present time. Has occasional abdominal cramping. Reviewed interval ancillary notes    Current Medications  0.9 % sodium chloride infusion, Continuous  morphine (PF) injection 2 mg, Q4H PRN  piperacillin-tazobactam (ZOSYN) 3,375 mg in dextrose 5 % 50 mL IVPB extended infusion (mini-bag), Q8H  LORazepam (ATIVAN) injection 0.5 mg, Q6H PRN  sodium chloride flush 0.9 % injection 5-40 mL, 2 times per day  sodium chloride flush 0.9 % injection 5-40 mL, PRN  0.9 % sodium chloride infusion, PRN  enoxaparin (LOVENOX) injection 40 mg, Daily  ondansetron (ZOFRAN-ODT) disintegrating tablet 4 mg, Q8H PRN   Or  ondansetron (ZOFRAN) injection 4 mg, Q6H PRN  polyethylene glycol (GLYCOLAX) packet 17 g, Daily PRN  acetaminophen (TYLENOL) tablet 650 mg, Q6H PRN   Or  acetaminophen (TYLENOL) suppository 650 mg, Q6H PRN        Objective:  BP 98/71   Pulse 101   Temp 97.7 °F (36.5 °C) (Axillary)   Resp 16   Ht 5' 11\" (1.803 m)   Wt 179 lb 3.2 oz (81.3 kg)   SpO2 96%   BMI 24.99 kg/m²   No intake or output data in the 24 hours ending 12/06/21 1022   Wt Readings from Last 3 Encounters:   12/05/21 179 lb 3.2 oz (81.3 kg)   12/01/21 169 lb 6.4 oz (76.8 kg)   10/13/21 175 lb 3.2 oz (79.5 kg)       General appearance:  Appears comfortable  Eyes: Sclera clear. Pupils equal.  ENT: NG in place. Moist oral mucosa. Trachea midline, no adenopathy. Cardiovascular: Regular rhythm, normal S1, S2. No murmur.  No edema in lower extremities  Respiratory: Not using accessory muscles. Good inspiratory effort. Clear to auscultation bilaterally, no wheeze or crackles. GI: Abdomen slightly distended, nontender, scattered bowel sounds  Musculoskeletal: No cyanosis in digits, neck supple  Neurology: CN 2-12 grossly intact. No speech or motor deficits  Psych: Normal affect. Alert and oriented in time, place and person  Skin: Warm, dry, normal turgor    Labs and Tests:  CBC:   Recent Labs     12/05/21  1557   WBC 13.9*   HGB 11.8*        BMP:    Recent Labs     12/05/21  1557   *   K 4.0   CL 95*   CO2 28   BUN 9   CREATININE 0.9   GLUCOSE 112*     Hepatic:   Recent Labs     12/05/21  1557   AST 44*   ALT 21   BILITOT 1.3*   ALKPHOS 238*     CT abd/pelvis  1. Worsening small bowel obstruction with transition point at the ileocolic   anastomosis.  There is focal 2.2 cm area of wall thickening which may reflect   underlying stricture or mass.  No free air or pneumatosis. 2. Otherwise improved small bowel wall thickening. 3. Moderate to large volume ascites.  There is overall anasarca with   subcutaneous edema and pleural effusions as well. 4. New ground-glass attenuation and slightly increasing consolidative changes   within the lung bases suspicious for superimposed pneumonia.  Right lower   lobe mass is otherwise partially obscured due to atelectasis. 5. Hepatic steatosis.  No significant interval change in hepatic lesions. 6. Splenomegaly. Daphane Flies is a subtle area of low attenuation within the   lateral spleen, nonspecific and could be related to timing of the contrast   bolus; however, findings could also be related to developing splenic infarct. Problem List  Active Problems:    Bowel obstruction (HCC)  Resolved Problems:    * No resolved hospital problems. *       Assessment & Plan:   1. Bowel obstruction-NG in place. Surgery has been consulted. 2. Pneumonia-likely aspiration from vomitus. Continue zosyn. 3. Metastatic colon ca-followed by Dr Jennifer Lemons. 4. Ascites-possibly malignant. Discussed with oncology last week-hold off on paracentesis for now given bowel obstruction.        Diet: Diet NPO Exceptions are: Ice Chips  Code:Full Code  DVT PPX: justina Marquez PA-C   12/6/2021 10:22 AM

## 2021-12-06 NOTE — ANESTHESIA PRE PROCEDURE
Department of Anesthesiology  Preprocedure Note       Name:  John Nguyen   Age:  57 y.o.  :  1960                                          MRN:  [id number]         Date:  2021      Surgeon: Robby Lombardo):  Seamus Llanes MD    Procedure: Procedure(s):  EXPLORATORY LAPAROTOMY, POSSIBLE BOWEL RESECTION    Medications prior to admission:   Prior to Admission medications    Medication Sig Start Date End Date Taking? Authorizing Provider   ciprofloxacin (CIPRO) 500 MG tablet Take 1 tablet by mouth 2 times daily for 7 days 21 Yes Kamar Murillo PA-C   LORazepam (ATIVAN) 1 MG tablet  21  Yes Historical Provider, MD   diphenoxylate-atropine (LOMOTIL) 2.5-0.025 MG per tablet Take 2.5 mg by mouth. 20  Yes Historical Provider, MD   lidocaine-prilocaine (EMLA) 2.5-2.5 % cream Apply a thick layer over the port 30 minutes prior to treatment and cover with saran wrap. 17  Yes Byron Guzman MD   metroNIDAZOLE (FLAGYL) 500 MG tablet Take 1 tablet by mouth 3 times daily for 7 days 21  Kamar Murillo PA-C   sodium chloride 0.9 % SOLN with fluorouracil 500 MG/10ML SOLN Infuse intravenously Over 46 hours Patient pump bag dosage reads: 4750 mg/ mL infusion at 5mL/hr.     Historical Provider, MD   dicyclomine (BENTYL) 10 MG capsule Take 1 capsule by mouth 3 times daily as needed (cramping) 7/8/21 10/15/21  Jose Jacobo MD   Magic Mouthwash (MIRACLE MOUTHWASH) Swish and spit 5 mLs 4 times daily as needed for Irritation    Historical Provider, MD       Current medications:    Current Facility-Administered Medications   Medication Dose Route Frequency Provider Last Rate Last Admin    0.9 % sodium chloride infusion   IntraVENous Continuous Kamar Murillo PA-C 100 mL/hr at 21 1212 New Bag at 21 1212    morphine (PF) injection 2 mg  2 mg IntraVENous Q4H PRN Jarvis Ruiz MD   2 mg at 21 1110    piperacillin-tazobactam (ZOSYN) 3,375 mg in dextrose 5 % 50 mL IVPB extended infusion (mini-bag)  3,375 mg IntraVENous Debra Acuna MD   Stopped at 12/06/21 1336    LORazepam (ATIVAN) injection 0.5 mg  0.5 mg IntraVENous Q6H PRN Armand Carter MD        sodium chloride flush 0.9 % injection 5-40 mL  5-40 mL IntraVENous 2 times per day Armand Carter MD   10 mL at 12/06/21 0843    sodium chloride flush 0.9 % injection 5-40 mL  5-40 mL IntraVENous PRN Armand Carter MD        0.9 % sodium chloride infusion  25 mL IntraVENous PRN Armand Carter MD        enoxaparin (LOVENOX) injection 40 mg  40 mg SubCUTAneous Daily Armand Carter MD        ondansetron (ZOFRAN-ODT) disintegrating tablet 4 mg  4 mg Oral Q8H PRN Armand Carter MD        Or    ondansetron (ZOFRAN) injection 4 mg  4 mg IntraVENous Q6H PRN Armand Carter MD        polyethylene glycol (GLYCOLAX) packet 17 g  17 g Oral Daily PRN Armand Carter MD        acetaminophen (TYLENOL) tablet 650 mg  650 mg Oral Q6H PRN Armand Crater MD        Or   [address] [place] [address] acetaminophen (TYLENOL) suppository 650 mg  650 mg Rectal Q6H PRN Armand Carter MD           Allergies:     Allergies   Allergen Reactions    Potassium-Containing Compounds Rash       Problem List:    Patient Active Problem List   Diagnosis Code    Malignant neoplasm of ascending colon ([facility] [address] C18.2    Colon cancer metastasized to liver ([facility] [facility] C18.9, C78.7    Chemotherapy-induced neutropenia (HCC) D70.1, T45.1X5A    Chemotherapy-induced thrombocytopenia D69.59, T45.1X5A    Cancer of ascending colon ([facility] C18.2    Right lower lobe lung mass R91.8    Lung mass R91.8    Liver lesion K76.9    Positive blood cultures R78.81    Metastasis from colon cancer (HCC) C79.9, C18.9    Essential hypertension I10    Alkaline phosphatase elevation R74.8    Non-smoker Z78.9    Severe malnutrition ([facility] [facility] E43    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    Receiving intravenous antibiotic treatment as outpatient Z79.2    Acute hypokalemia E87.6    Hypokalemia E87.6    Ileus ([facility] K56.7    Colonic mass K63.89    SBO (small bowel obstruction) ([facility] [facility] K56.609    Bowel obstruction ([facility] K56.609       Past Medical History:        Diagnosis Date    Anemia     Colon cancer ([facility] [address]     LAST CHEMO 2/5/18    Hypertension     Lung nodule     metastatic        Past Surgical History:        Procedure Laterality Date    COLONOSCOPY      CYST REMOVAL  1973    Behind right knee    LIVER BIOPSY Right 10/05/2017    phalen MD at [facility] in specials as outpt    OTHER SURGICAL HISTORY  03/19/2018     Robotic assisted converted to open right colectomy with anastomosis, greater omental vascularized pedicled flap creation    THORACOSCOPY Right 04/16/2018    RIGHT VIDEO ASSISTED THORACOSCOPY WITH WEDGE EXCISION RIGHT    TUNNELED VENOUS PORT PLACEMENT Left 05/30/2017    UPPER GASTROINTESTINAL ENDOSCOPY      WISDOM TOOTH EXTRACTION         Social History:    Social History     Tobacco Use    Smoking status: Never Smoker    Smokeless tobacco: Never Used   Substance Use Topics    Alcohol use: Yes     Alcohol/week: 2.0 standard drinks     Types: 2 Cans of beer per week     Comment: daily                                 Counseling given: Not Answered      Vital Signs (Current):   Vitals:    12/06/21 0335 12/06/21 0830 12/06/21 1100 12/06/21 1545   BP:  98/71 110/77 101/69   Pulse: 86 101 93 88   Resp:  16 16 16   Temp: 97.6 °F (36.4 °C) 97.7 °F (36.5 °C) 97.7 °F (36.5 °C) 97.8 °F (36.6 °C)   TempSrc: Oral Axillary Oral Oral   SpO2:  96%  96%   Weight:       Height:                                                  BP Readings from Last 3 Encounters:   12/06/21 101/69   12/02/21 111/75   10/29/21 101/65       NPO Status:                                                                                 BMI:   Wt Readings from Last 3 Encounters:   12/05/21 179 lb 3.2 oz (81.3 kg)   12/01/21 169 lb 6.4 oz (76.8 kg)   10/13/21 175 lb 3.2 oz (79.5 kg)     Body mass index is 24.99 kg/m².     CBC:   Lab Results Component Value Date    WBC 7.1 12/06/2021    RBC 2.91 12/06/2021    RBC 5.11 08/21/2017    HGB 9.6 12/06/2021    HCT 28.8 12/06/2021    MCV 98.8 12/06/2021    RDW 17.3 12/06/2021     12/06/2021       CMP:   Lab Results   Component Value Date     12/06/2021    K 3.7 12/06/2021    CL 99 12/06/2021    CO2 27 12/06/2021    BUN 8 12/06/2021    CREATININE 0.8 12/06/2021    GFRAA >60 12/06/2021    AGRATIO 0.5 12/02/2021    LABGLOM >60 12/06/2021    GLUCOSE 80 12/06/2021    GLUCOSE 105 08/21/2017    PROT 6.3 12/05/2021    PROT 7.4 08/21/2017    CALCIUM 7.6 12/06/2021    BILITOT 1.3 12/05/2021    ALKPHOS 238 12/05/2021    AST 44 12/05/2021    ALT 21 12/05/2021       POC Tests: No results for input(s): POCGLU, POCNA, POCK, POCCL, POCBUN, POCHEMO, POCHCT in the last 72 hours.     Coags:   Lab Results   Component Value Date    PROTIME 15.8 12/05/2021    INR 1.38 12/05/2021    APTT 40.9 12/01/2021       HCG (If Applicable): No results found for: PREGTESTUR, PREGSERUM, HCG, HCGQUANT     ABGs: No results found for: PHART, PO2ART, OPK6YHW, GMI3PAF, BEART, V9QHVJRC     Type & Screen (If Applicable):  No results found for: LABABO, LABRH    Drug/Infectious Status (If Applicable):  No results found for: HIV, HEPCAB    COVID-19 Screening (If Applicable):   Lab Results   Component Value Date    COVID19 Not Detected 12/05/2021    COVID19 Not Detected 01/27/2021    COVID19 NOT DETECTED 11/05/2020           Anesthesia Evaluation  Patient summary reviewed and Nursing notes reviewed no history of anesthetic complications:   Airway: Mallampati: II  TM distance: >3 FB   Neck ROM: full  Mouth opening: > = 3 FB Dental: normal exam         Pulmonary:Negative Pulmonary ROS and normal exam  breath sounds clear to auscultation      (-) COPD, asthma, sleep apnea and not a current smoker                           Cardiovascular:    (+) hypertension:,     (-) past MI, CAD, CABG/stent, dysrhythmias,  angina and  CHF (echo 4/21 EF 55-60 no rwma)    ECG reviewed  Rhythm: regular  Rate: normal  Echocardiogram reviewed                  Neuro/Psych:   (+) depression/anxiety    (-) seizures, TIA and CVA           GI/Hepatic/Renal:   (+) liver disease (liver mets):,      (-) GERD and no renal disease       Endo/Other:    (+) malignancy/cancer (colon). (-) diabetes mellitus, hypothyroidism, hyperthyroidism               Abdominal:             Vascular: Other Findings:           Anesthesia Plan      general     ASA 3 - emergent       Induction: intravenous and rapid sequence. MIPS: Postoperative opioids intended and Prophylactic antiemetics administered. Anesthetic plan and risks discussed with patient. Plan discussed with CRNA.                   Ld Lombardo MD   12/6/2021

## 2021-12-06 NOTE — PLAN OF CARE
Pt arrived on unit. Oriented to room and unit. No questions or concerns at this time.  Will continue to monitor

## 2021-12-06 NOTE — CONSULTS
[facility] GENERAL AND LAPAROSCOPIC SURGERY                       PATIENT NAME: Ilana Stinson DATE: 12/5/2021  3:34 PM      TODAY'S DATE: 12/6/2021    Reason for Consult:  Abd pain    Requesting Physician:  Dr. Rosalba Kelsey:              The patient is a 57 y.o. male who presents with abdominal pain, pt with diffuse, moderate tenderness, more with pressure, has had assoc N/V, and lack of BM. Has known colon ca, prior ileocolonic resection,. And partial liver resections. Also had prior lung wedge resection. Just inpt at [facility], [place] with SBO, improved clinical to point of discharge, but then acutely all at home again.     Past Medical History:        Diagnosis Date    Anemia     Colon cancer ([address]     LAST CHEMO 2/5/18    Hypertension     Lung nodule     metastatic        Past Surgical History:        Procedure Laterality Date    COLONOSCOPY      CYST REMOVAL  1973    Behind right knee    LIVER BIOPSY Right 10/05/2017    phalen MD [facility] in specials as outpt    OTHER SURGICAL HISTORY  03/19/2018     Robotic assisted converted to open right colectomy with anastomosis, greater omental vascularized pedicled flap creation    THORACOSCOPY Right 04/16/2018    RIGHT VIDEO ASSISTED THORACOSCOPY WITH WEDGE EXCISION RIGHT    TUNNELED VENOUS PORT PLACEMENT Left 05/30/2017    UPPER GASTROINTESTINAL ENDOSCOPY      WISDOM TOOTH EXTRACTION         Current Medications:   Current Facility-Administered Medications: meperidine (DEMEROL) injection 12.5 mg, 12.5 mg, IntraVENous, Q5 Min PRN  HYDROmorphone (DILAUDID) injection 0.5 mg, 0.5 mg, IntraVENous, Q5 Min PRN  ondansetron (ZOFRAN) injection 4 mg, 4 mg, IntraVENous, Once PRN  labetalol (NORMODYNE;TRANDATE) injection 5 mg, 5 mg, IntraVENous, Q10 Min PRN  hydrALAZINE (APRESOLINE) injection 5 mg, 5 mg, IntraVENous, Q10 Min PRN  0.9 % sodium chloride infusion, , IntraVENous, Continuous  morphine (PF) injection 2 mg, 2 mg, IntraVENous, Q4H PRN  piperacillin-tazobactam (ZOSYN) 3,375 mg in dextrose 5 % 50 mL IVPB extended infusion (mini-bag), 3,375 mg, IntraVENous, Q8H  LORazepam (ATIVAN) injection 0.5 mg, 0.5 mg, IntraVENous, Q6H PRN  sodium chloride flush 0.9 % injection 5-40 mL, 5-40 mL, IntraVENous, 2 times per day  sodium chloride flush 0.9 % injection 5-40 mL, 5-40 mL, IntraVENous, PRN  0.9 % sodium chloride infusion, 25 mL, IntraVENous, PRN  enoxaparin (LOVENOX) injection 40 mg, 40 mg, SubCUTAneous, Daily  ondansetron (ZOFRAN-ODT) disintegrating tablet 4 mg, 4 mg, Oral, Q8H PRN **OR** ondansetron (ZOFRAN) injection 4 mg, 4 mg, IntraVENous, Q6H PRN  polyethylene glycol (GLYCOLAX) packet 17 g, 17 g, Oral, Daily PRN  acetaminophen (TYLENOL) tablet 650 mg, 650 mg, Oral, Q6H PRN **OR** acetaminophen (TYLENOL) suppository 650 mg, 650 mg, Rectal, Q6H PRN  Prior to Admission medications    Medication Sig Start Date End Date Taking? Authorizing Provider   ciprofloxacin (CIPRO) 500 MG tablet Take 1 tablet by mouth 2 times daily for 7 days 12/2/21 12/9/21 Yes Mayela Gutierrez PA-C   LORazepam (ATIVAN) 1 MG tablet  11/29/21  Yes Historical Provider, MD   diphenoxylate-atropine (LOMOTIL) 2.5-0.025 MG per tablet Take 2.5 mg by mouth. 12/1/20  Yes Historical Provider, MD   lidocaine-prilocaine (EMLA) 2.5-2.5 % cream Apply a thick layer over the port 30 minutes prior to treatment and cover with saran wrap. 7/11/17  Yes Monica Campbell MD   metroNIDAZOLE (FLAGYL) 500 MG tablet Take 1 tablet by mouth 3 times daily for 7 days 12/2/21 12/9/21  Mayela Gutierrez PA-C   sodium chloride 0.9 % SOLN with fluorouracil 500 MG/10ML SOLN Infuse intravenously Over 46 hours Patient pump bag dosage reads: 4750 mg/ mL infusion at 5mL/hr.     Historical Provider, MD   dicyclomine (BENTYL) 10 MG capsule Take 1 capsule by mouth 3 times daily as needed (cramping) 7/8/21 10/15/21  Maame Muniz MD   Magic Mouthwash (MIRACLE MOUTHWASH) Swish and spit 5 mLs 4 times daily as needed for Irritation    Historical Provider, MD        Allergies:  Potassium-containing compounds    Social History:    reports that he has never smoked. He has never used smokeless tobacco. He reports current alcohol use of about 2.0 standard drinks of alcohol per week. He reports that he does not use drugs.     Family History:        Problem Relation Age of Onset    Cancer Father         Skin (melanoma)    Mult Sclerosis Mother     No Known Problems Brother        REVIEW OF SYSTEMS:  CONSTITUTIONAL:  positive for  fatigue and malaise  HEENT:  negative  RESPIRATORY:  negative  CARDIOVASCULAR:  negative  GASTROINTESTINAL:  positive for nausea, vomiting, abdominal pain and abdominal distention  GENITOURINARY:  negative  HEMATOLOGIC/LYMPHATIC:  negative  NEUROLOGICAL:  negative  SKIN: negative    PHYSICAL EXAM:  VITALS:  /80   Pulse 88   Temp 97.1 °F (36.2 °C) (Temporal)   Resp 18   Ht 5' 11\" (1.803 m)   Wt 179 lb 3.2 oz (81.3 kg)   SpO2 99%   BMI 24.99 kg/m²   24HR INTAKE/OUTPUT:  No intake or output data in the 24 hours ending 12/06/21 1743  DRAIN/TUBE OUTPUT:     CONSTITUTIONAL:  somnolent, mild distress and normal weight  EYES:  sclera clear  ENT:  normocepalic, without obvious abnormality  NECK:  supple, symmetrical, trachea midline and no carotid bruits  LUNGS:  clear to auscultation, no crackles or wheezing  CARDIOVASCULAR:  regular rate and rhythm and no murmur noted  ABDOMEN:  scars noted large midline scar, hypoactive bowel sounds, firm, distended, tenderness noted in the epigastric region, voluntary guarding absent, no masses palpated, no hepatosplenomegally and hernia absent  MUSCULOSKELETAL:  0+ pitting edema lower extremities  NEUROLOGIC:  Mental Status Exam:  Level of Alertness:   awake  Orientation:   person, place, time  SKIN:  no bruising or bleeding, normal skin color, texture, turgor and no redness, warmth, or swelling    DATA:    CBC:   Recent Labs     12/05/21  1557 noted within the lung bases predominately within the right and left lower lobes. This has slightly progressed since the prior exam.  Persistent mass-like area posterior to the right hilum is again noted, but otherwise difficult to measure due to atelectasis in this region. Organs: There is diffuse low-attenuation of the liver. Hepatic lesions are again identified, stable, the largest measuring up to 3.6 x 2.5 cm in segment 3. Right hepatic dome lesions are otherwise stable. Spleen is enlarged measuring up to 15 cm in the craniocaudal dimension. There is a subtle area of low attenuation within the lateral spleen near the dome (for example image 44) not well seen on the prior exam.  The pancreas, gallbladder and adrenal glands are without focal abnormality. No renal or ureteral calculus. No hydronephrosis or perinephric stranding. The kidneys enhance symmetrically. GI/Bowel: Postsurgical changes from right hemicolectomy. The small bowel is diffusely dilated which has overall progressed since the prior exam.  The transition point is at the ileocolic anastomosis, where there is focal wall thickening measuring approximately 2.2 cm in length. The distal colon is relatively collapsed. Colonic diverticulosis without acute diverticulitis. No free air or extraluminal contrast.  Small bowel dilation measures up to a maximum 4.3 cm. Small bowel wall thickening seen on the prior exam is less apparent on today's exam.  No pneumatosis. Pelvis: Moderate volume ascites. Bladder is decompressed. No pathologically enlarged adenopathy. Peritoneum/Retroperitoneum: The aorta is normal caliber. The celiac axis, SMA and TERRI are patent. The portal venous system is patent. Moderate to large volume ascites. There is mild stranding within the omentum in the left upper quadrant, some of which could be related to the volume of ascites.  Upper abdominal adenopathy is not significantly changed from the prior exam, including a 2.7 x 1.0 cm portacaval lymph node and a 1.7 x 1.6 cm periceliac lymph node. Bones/Soft Tissues: Subcutaneous edema. No acute osseous abnormality. 1. Worsening small bowel obstruction with transition point at the ileocolic anastomosis. There is focal 2.2 cm area of wall thickening which may reflect underlying stricture or mass. No free air or pneumatosis. 2. Otherwise improved small bowel wall thickening. 3. Moderate to large volume ascites. There is overall anasarca with subcutaneous edema and pleural effusions as well. 4. New ground-glass attenuation and slightly increasing consolidative changes within the lung bases suspicious for superimposed pneumonia. Right lower lobe mass is otherwise partially obscured due to atelectasis. 5. Hepatic steatosis. No significant interval change in hepatic lesions. 6. Splenomegaly. There is a subtle area of low attenuation within the lateral spleen, nonspecific and could be related to timing of the contrast bolus; however, findings could also be related to developing splenic infarct. XR CHEST PORTABLE    Result Date: 12/5/2021  EXAMINATION: ONE XRAY VIEW OF THE CHEST 12/5/2021 3:46 pm COMPARISON: 07/02/2020 HISTORY: ORDERING SYSTEM PROVIDED HISTORY: cough, fluid overload TECHNOLOGIST PROVIDED HISTORY: Reason for exam:->cough, fluid overload Reason for Exam: Abdominal Pain (Pt states he has had belly pain since last thursday, and also vomiting. Feels like he is constipated as well. Hx colon cancer) FINDINGS: Left-sided central venous catheter remains in place. Cardiomediastinal silhouette stable. New right parahilar airspace opacity. No pneumothorax. No pleural effusion. New right parahilar airspace opacity suggesting pneumonia     XR ABDOMEN FOR NG/OG/NE TUBE PLACEMENT    Result Date: 12/5/2021  EXAMINATION: ONE SUPINE XRAY VIEW(S) OF THE ABDOMEN 12/5/2021 3:39 pm COMPARISON: None.  HISTORY: ORDERING SYSTEM PROVIDED HISTORY: NG placement TECHNOLOGIST PROVIDED HISTORY: Reason for exam:->NG placement Portable? ->Yes Reason for Exam: NG placement Acuity: Acute Type of Exam: Initial FINDINGS: Enteric tube is noted. The tip is superimposed over the gastric body; however, the side port is superimposed over the expected location of the gastroesophageal junction. Bibasilar atelectasis is noted. Gaseous dilation of small bowel loops is seen. The tip of the enteric tube is in the expected position, superimposed over the gastric body. However, the side port is superimposed over the expected location of the gastroesophageal junction. Consequently, the tube should be advanced approximately 4-6 additional cm and reimaged prior to use.        IMPRESSION/RECOMMENDATIONS:    SBO  Concern of recurrent malignancy based on CT findings and ascites    Needs exploration as pt has just returned worse than at prior admit  Offered and declined transfer to [address] [place] [address] Court  Will hydrate, decompress with NG, and plan laparotomy today  DW pt, R/B/A reviewed, all Q answered, wishes to proceed     Thank you,    Wendi Singer MD

## 2021-12-06 NOTE — H&P
HOSPITALISTS HISTORY AND PHYSICAL    12/5/2021     Patient Information:  Jaja Burrows is a 57 y.o. male [id number]  PCP:  Beba Lopez MD (Tel: [phone] )    Chief complaint:    Chief Complaint   Patient presents with    Abdominal Pain     Pt states he has had belly pain since last thursday, and also vomiting. Feels like he is constipated as well. Hx colon cancer      History of Present Illness:  Teresa Johnson is a 57 y.o. male with h/o metastatic Colon CA, bowel obstruction, colectomy, HTN presented with c/o abdominal pain , vomiting and constipation  . He was admitted at [facility]   11/29 was diagnosed with bowel obstruction . He was managed medically and did not need surgical intervention . He follows up with [facility] for Colon CA with mets to liver. Not currently on Chemo d/t side effects. CT abdomen and pelvis showed small bowel obstruction at ileocolic anastomosis. Also showed moderate ascites  REVIEW OF SYSTEMS:   Constitutional: Negative for fever,chills or night sweats  ENT: Negative for rhinorrhea, epistaxis, hoarseness, sore throat. Respiratory: Negative for shortness of breath,wheezing  Cardiovascular: Negative for chest pain, palpitations   Gastrointestinal: +Ve for nausea, vomiting, constipation   Genitourinary: Negative for polyuria, dysuria   Hematologic/Lymphatic: Negative for bleeding tendency, easy bruising  Musculoskeletal: Negative for myalgias and arthralgias  Neurologic: Negative for confusion,dysarthria. Skin: Negative for itching,rash  Psychiatric: Negative for depression,anxiety, agitation. Endocrine: Negative for polydipsia,polyuria,heat /cold intolerance. Past Medical History:   has a past medical history of Anemia, Colon cancer ([address] Hypertension, and Lung nodule. Past Surgical History:   has a past surgical history that includes Colonoscopy; cyst removal (1973);  Tunneled venous port placement (Left, 05/30/2017); liver biopsy (Right, 10/05/2017); [place] tooth extraction; Upper gastrointestinal endoscopy; other surgical history (03/19/2018); and Thoracoscopy (Right, 04/16/2018). Medications:  No current facility-administered medications on file prior to encounter. Current Outpatient Medications on File Prior to Encounter   Medication Sig Dispense Refill    ciprofloxacin (CIPRO) 500 MG tablet Take 1 tablet by mouth 2 times daily for 7 days 14 tablet 0    LORazepam (ATIVAN) 1 MG tablet       diphenoxylate-atropine (LOMOTIL) 2.5-0.025 MG per tablet Take 2.5 mg by mouth.  lidocaine-prilocaine (EMLA) 2.5-2.5 % cream Apply a thick layer over the port 30 minutes prior to treatment and cover with saran wrap. 1 Tube 5    metroNIDAZOLE (FLAGYL) 500 MG tablet Take 1 tablet by mouth 3 times daily for 7 days 21 tablet 0    sodium chloride 0.9 % SOLN with fluorouracil 500 MG/10ML SOLN Infuse intravenously Over 46 hours Patient pump bag dosage reads: 4750 mg/ mL infusion at 5mL/hr.       dicyclomine (BENTYL) 10 MG capsule Take 1 capsule by mouth 3 times daily as needed (cramping) 30 capsule 3    Magic Mouthwash (MIRACLE MOUTHWASH) Swish and spit 5 mLs 4 times daily as needed for Irritation       Current Facility-Administered Medications   Medication Dose Route Frequency Provider Last Rate Last Admin    0.9 % sodium chloride infusion   IntraVENous Continuous Prakash Yang MD 50 mL/hr at 12/05/21 2235 New Bag at 12/05/21 2235    morphine (PF) injection 2 mg  2 mg IntraVENous Q4H PRN Prakash Yang MD       [facility] [START ON 12/6/2021] piperacillin-tazobactam (ZOSYN) 3,375 mg in dextrose 5 % 50 mL IVPB extended infusion (mini-bag)  3,375 mg IntraVENous Q8H Mounika Vázquez MD        LORazepam (ATIVAN) injection 0.5 mg  0.5 mg IntraVENous Q6H PRN Prakash Yang MD        sodium chloride flush 0.9 % injection 5-40 mL  5-40 mL IntraVENous 2 times per day Prakash Yang MD   10 mL at 12/05/21 2236    sodium chloride flush 0.9 % injection 5-40 mL  5-40 mL IntraVENous PRN Janie Solis MD        0.9 % sodium chloride infusion  25 mL IntraVENous PRN MD Marielle Segovia Brad Sosa ON 12/6/2021] enoxaparin (LOVENOX) injection 40 mg  40 mg SubCUTAneous Daily Mounika Vázquez MD        ondansetron (ZOFRAN-ODT) disintegrating tablet 4 mg  4 mg Oral Q8H PRN Janie Solis MD        Or    ondansetron (ZOFRAN) injection 4 mg  4 mg IntraVENous Q6H PRN Janie Solis MD        polyethylene glycol (GLYCOLAX) packet 17 g  17 g Oral Daily PRN Janie Solis MD        acetaminophen (TYLENOL) tablet 650 mg  650 mg Oral Q6H PRN Janie Solis MD        Or   Marielle Clifford acetaminophen (TYLENOL) suppository 650 mg  650 mg Rectal Q6H PRN Janie Solis MD           Allergies: Allergies   Allergen Reactions    Potassium-Containing Compounds Rash        Social History:   reports that he has never smoked. He has never used smokeless tobacco. He reports current alcohol use of about 2.0 standard drinks of alcohol per week. He reports that he does not use drugs. Family History:  family history includes Cancer in his father; Mult Sclerosis in his mother; No Known Problems in his brother. ,     Physical Exam:  /69   Pulse 96   Temp 97.6 °F (36.4 °C) (Oral)   Resp 16   Ht 5' 11\" (1.803 m)   Wt 179 lb 3.2 oz (81.3 kg)   SpO2 97%   BMI 24.99 kg/m²     General appearance:  Appears comfortable. Well nourished  Eyes: Sclera clear, pupils equal  ENT: Moist mucus membranes, no thrush. Trachea midline. Cardiovascular: Regular rhythm, normal S1, S2. No murmur, gallop, rub. No edema in lower extremities  Respiratory: Clear to auscultation bilaterally, no wheeze, good inspiratory effort  Gastrointestinal: Abdomen soft, non-tender, not distended, normal bowel sounds  Musculoskeletal: No cyanosis in digits, neck supple  Neurology: Cranial nerves grossly intact. Alert and oriented in time, place and person.  No speech or motor deficits  Psychiatry: Appropriate affect. Not agitated  Skin: Warm, dry, normal turgor, no rash    Labs:  CBC:   Lab Results   Component Value Date    WBC 13.9 12/05/2021    RBC 3.66 12/05/2021    RBC 5.11 08/21/2017    HGB 11.8 12/05/2021    HCT 35.8 12/05/2021    MCV 97.9 12/05/2021    MCH 32.3 12/05/2021    MCHC 33.0 12/05/2021    RDW 17.2 12/05/2021     12/05/2021    MPV 7.5 12/05/2021     BMP:    Lab Results   Component Value Date     12/05/2021    K 4.0 12/05/2021    CL 95 12/05/2021    CO2 28 12/05/2021    BUN 9 12/05/2021    CREATININE 0.9 12/05/2021    CALCIUM 8.4 12/05/2021    GFRAA >60 12/05/2021    LABGLOM >60 12/05/2021    GLUCOSE 112 12/05/2021    GLUCOSE 105 08/21/2017       Chest Xray:   EKG:        Problem List  Active Problems:    Bowel obstruction (HCC)  Resolved Problems:    * No resolved hospital problems. *        Assessment/Plan:         1. Small bowel obstruction   NPO, IV fluids, IV analgesics and IV antiemetics  Gentle hydration   Surgery plans for OR  COVID testing pending      Admit as inpatient. I anticipate hospitalization spanning more than two midnights for investigation and treatment of the above medically necessary diagnoses.       Abelardo Calixto MD    12/5/2021 11:52 PM

## 2021-12-07 LAB
BUN BLDV-MCNC: 8 MG/DL (ref 7–20)
CALCIUM SERPL-MCNC: 7.8 MG/DL (ref 8.3–10.6)
CHLORIDE BLD-SCNC: 105 MMOL/L (ref 99–110)
CREAT SERPL-MCNC: 0.7 MG/DL (ref 0.8–1.3)
EKG ATRIAL RATE: 110 BPM
EKG DIAGNOSIS: NORMAL
EKG P AXIS: 77 DEGREES
EKG P-R INTERVAL: 162 MS
EKG Q-T INTERVAL: 350 MS
EKG QRS DURATION: 82 MS
EKG QTC CALCULATION (BAZETT): 473 MS
EKG R AXIS: -8 DEGREES
EKG T AXIS: 67 DEGREES
EKG VENTRICULAR RATE: 110 BPM
GFR AFRICAN AMERICAN: >60
GFR NON-AFRICAN AMERICAN: >60
GLUCOSE BLD-MCNC: 81 MG/DL (ref 70–99)
HCT VFR BLD CALC: 32.1 % (ref 40.5–52.5)
HEMOGLOBIN: 10.5 G/DL (ref 13.5–17.5)
MCH RBC QN AUTO: 33.9 PG (ref 26–34)
MCHC RBC AUTO-ENTMCNC: 32.6 G/DL (ref 31–36)
MCV RBC AUTO: 103.9 FL (ref 80–100)
PDW BLD-RTO: 18 % (ref 12.4–15.4)
PLATELET # BLD: 115 K/UL (ref 135–450)
PMV BLD AUTO: 7.9 FL (ref 5–10.5)
POTASSIUM SERPL-SCNC: 4 MMOL/L (ref 3.5–5.1)
RBC # BLD: 3.09 M/UL (ref 4.2–5.9)
SODIUM BLD-SCNC: 135 MMOL/L (ref 136–145)
WBC # BLD: 6.2 K/UL (ref 4–11)

## 2021-12-07 PROCEDURE — 6360000002 HC RX W HCPCS: Performed by: NURSE PRACTITIONER

## 2021-12-07 PROCEDURE — 36415 COLL VENOUS BLD VENIPUNCTURE: CPT

## 2021-12-07 PROCEDURE — 80048 BASIC METABOLIC PNL TOTAL CA: CPT

## 2021-12-07 PROCEDURE — 6360000002 HC RX W HCPCS: Performed by: FAMILY MEDICINE

## 2021-12-07 PROCEDURE — 1200000000 HC SEMI PRIVATE

## 2021-12-07 PROCEDURE — 85027 COMPLETE CBC AUTOMATED: CPT

## 2021-12-07 PROCEDURE — 2580000003 HC RX 258: Performed by: PHYSICIAN ASSISTANT

## 2021-12-07 PROCEDURE — APPNB30 APP NON BILLABLE TIME 0-30 MINS: Performed by: NURSE PRACTITIONER

## 2021-12-07 PROCEDURE — 2580000003 HC RX 258: Performed by: FAMILY MEDICINE

## 2021-12-07 PROCEDURE — 99024 POSTOP FOLLOW-UP VISIT: CPT | Performed by: SURGERY

## 2021-12-07 PROCEDURE — 93010 ELECTROCARDIOGRAM REPORT: CPT | Performed by: INTERNAL MEDICINE

## 2021-12-07 PROCEDURE — APPSS15 APP SPLIT SHARED TIME 0-15 MINUTES: Performed by: NURSE PRACTITIONER

## 2021-12-07 RX ORDER — MORPHINE SULFATE 4 MG/ML
4 INJECTION, SOLUTION INTRAMUSCULAR; INTRAVENOUS
Status: DISCONTINUED | OUTPATIENT
Start: 2021-12-07 | End: 2021-12-09

## 2021-12-07 RX ORDER — HYDROMORPHONE HYDROCHLORIDE 1 MG/ML
1 INJECTION, SOLUTION INTRAMUSCULAR; INTRAVENOUS; SUBCUTANEOUS
Status: DISCONTINUED | OUTPATIENT
Start: 2021-12-07 | End: 2021-12-09

## 2021-12-07 RX ADMIN — MORPHINE SULFATE 4 MG: 4 INJECTION, SOLUTION INTRAMUSCULAR; INTRAVENOUS at 12:22

## 2021-12-07 RX ADMIN — ENOXAPARIN SODIUM 40 MG: 100 INJECTION SUBCUTANEOUS at 08:46

## 2021-12-07 RX ADMIN — HYDROMORPHONE HYDROCHLORIDE 1 MG: 1 INJECTION, SOLUTION INTRAMUSCULAR; INTRAVENOUS; SUBCUTANEOUS at 21:06

## 2021-12-07 RX ADMIN — PIPERACILLIN AND TAZOBACTAM 3375 MG: 3; .375 INJECTION, POWDER, LYOPHILIZED, FOR SOLUTION INTRAVENOUS at 16:56

## 2021-12-07 RX ADMIN — Medication 10 ML: at 10:01

## 2021-12-07 RX ADMIN — MORPHINE SULFATE 4 MG: 4 INJECTION, SOLUTION INTRAMUSCULAR; INTRAVENOUS at 16:51

## 2021-12-07 RX ADMIN — PIPERACILLIN AND TAZOBACTAM 3375 MG: 3; .375 INJECTION, POWDER, LYOPHILIZED, FOR SOLUTION INTRAVENOUS at 03:00

## 2021-12-07 RX ADMIN — HYDROMORPHONE HYDROCHLORIDE 1 MG: 1 INJECTION, SOLUTION INTRAMUSCULAR; INTRAVENOUS; SUBCUTANEOUS at 15:10

## 2021-12-07 RX ADMIN — SODIUM CHLORIDE: 9 INJECTION, SOLUTION INTRAVENOUS at 15:16

## 2021-12-07 RX ADMIN — MORPHINE SULFATE 2 MG: 2 INJECTION, SOLUTION INTRAMUSCULAR; INTRAVENOUS at 04:06

## 2021-12-07 RX ADMIN — MORPHINE SULFATE 2 MG: 2 INJECTION, SOLUTION INTRAMUSCULAR; INTRAVENOUS at 08:46

## 2021-12-07 RX ADMIN — PIPERACILLIN AND TAZOBACTAM 3375 MG: 3; .375 INJECTION, POWDER, LYOPHILIZED, FOR SOLUTION INTRAVENOUS at 08:51

## 2021-12-07 ASSESSMENT — PAIN DESCRIPTION - PROGRESSION
CLINICAL_PROGRESSION: NOT CHANGED

## 2021-12-07 ASSESSMENT — PAIN SCALES - GENERAL
PAINLEVEL_OUTOF10: 8
PAINLEVEL_OUTOF10: 8
PAINLEVEL_OUTOF10: 7
PAINLEVEL_OUTOF10: 8
PAINLEVEL_OUTOF10: 9
PAINLEVEL_OUTOF10: 7

## 2021-12-07 ASSESSMENT — PAIN DESCRIPTION - PAIN TYPE
TYPE: SURGICAL PAIN
TYPE: SURGICAL PAIN

## 2021-12-07 ASSESSMENT — PAIN DESCRIPTION - LOCATION: LOCATION: ABDOMEN

## 2021-12-07 NOTE — OP NOTE
[address]                     [facility], [address]                                OPERATIVE REPORT    PATIENT NAME: Merly Vital                     :        1960  MED REC NO:   [id number]                          ROOM:       7374  ACCOUNT NO:   [de-identified]                           ADMIT DATE: 2021  PROVIDER:     Jennifer Montoya MD    DATE OF PROCEDURE:  2021    PREOPERATIVE DIAGNOSES:  1.  Small bowel obstruction. 2.  History of colon cancer. 3.  Intra-abdominal ascites. POSTOPERATIVE DIAGNOSES:  1.  Small bowel obstruction. 2.  History of colon cancer. 3.  Intra-abdominal ascites. 4.  Carcinomatosis and small bowel obstruction from recurrent cancer. PROCEDURE:  Exploratory laparotomy, ileocolonic resection of distal  ileum and prior hepatic flexure region anastomosis, omental resection. ESTIMATED BLOOD LOSS:  Minimal.    SPECIMENS:  Ileocolonic resection, distal small bowel resection, omentum  resected. INDICATIONS:  The patient is a 58-year-old gentleman presenting with  small bowel obstruction. The patient had initially exploratory  laparotomy with a right colectomy for an ascending colon cancer and  wedge liver resections done a few years ago. He has also had a  thoracoscopic wedge resection. The patient was admitted with an acute  small bowel obstruction recently, improved and was discharged; however,  he returned with recurring worsening symptoms of abdominal distention,  ascites and appearance of mass in the region of the anastomosis on  preoperative CAT scan imaging. FINDINGS:  In terms of findings at surgery, the patient had diffuse  carcinomatosis. There was a large volume of tumor in the omentum and  this was resected. The area of the primary intraintestinal recurrence  was at the anastomosis and this area was also resected. A few other  peritoneal nodules were resected.   There was diffuse disease through the  peritoneum and the mesenteric surfaces, with the only obstructing site  was at the anastomosis at this time. There was some tumor in the right  lateral pelvic sidewall with the bowel tethered onto it but was not  obstructing at this point and this was left alone. The anastomosis  itself was adherent densely into the area of the liver, duodenum and  posterior peritoneum. This was removed carefully without damage to the  underlying structures. Primary [id number] colon anastomosis was  completed following the resection. The patient did have approximately 2  liters of intraabdominal ascites which was evacuated through the course  of the surgery. ADDITIONAL DETAILS OF SURGERY:  The patient was brought to the operating  room and placed on the operating table in the supine position. Compression boots were placed. General anesthetic was administered. The abdomen was prepped and draped sterilely and time-out was done. A  vertical midline laparotomy incision was made, beginning in the upper  abdomen and first tissue. This was opened through his prior laparotomy  and with palpation of the peritoneum. It was obvious there were omental  adhesions and nodular changes in this region consistent with recurrent  tumor in the peritoneal surface and nodular omentum at the prior  incision. This was opened carefully going just to the left of this  region and taking all adhesions down with sharp scissor dissection,  Bovie electrocautery and LigaSure. We opened the abdomen widely and  then took down the remainder of the intra-abdominal wall adhesions. The  abdominal contents were examined. The omentum had a large volume of  tumor in it. There were scattered peritoneal nodules and mesenteric  nodules throughout. All the small bowel was dilated to the point of the  anastomosis and the colon was decompressed.   The patient had a near  complete small bowel obstruction at the site of the anastomosis. An  incision was made for resection. The colon and the small bowel were  both worked free from adhesions, taken down the area of the hepatic  flexure, scoring the mesentery and elevating off the retroperitoneal  surfaces. At the anastomosis, the region was especially adherent to the  duodenum. This was carefully cut with sharp scissor dissection without  incident. There was one small serosal tear in the duodenum, which was  oversewn with 3-0 silk sutures x2. The bowel was elevated off of the  retroperitoneum further and the proximal ileal side and the transverse  colon side were both cleaned off and staple transected with the Pocola  60-mm stapler with the green loads. The remainder of the mesenteric  dissection was then completed and the bowel was removed. The mesenteric  resection was oversewn a couple points with 2-0 silk sutures to ensure  hemostasis. Eventually at the end of the case, in the right lateral  abdomen area and the retroperitoneum, we placed Surgicel powder to aid  with hemostasis for overall tissue surface area. There was no gross  bleeding present at this time and through the end of the case. The small bowel was mobilized further and I resected an additional  approximately 15 cm to have a nice piece of intestine to work with for  the anastomosis. This was done taking the mesentery for LigaSure and  stapling across the bowel with the Pocola stapler with the green load. A side-to-side functional end-to-end anastomosis was then created  between the distal ileum and the transverse colon. The common opening  was checked and was hemostatic and widely patent. This was then brought  together and closed with a final firing of the Pocola 60-mm stapler  with the green load. The anastomosis area was all over sewn then with  interrupted 3-0 silk Lembert sutures.   Of note, the surgical team  changed their gloves after the initial resection and changed their  gloves again after doing the anastomosis. The omentum was especially laden with tumor and this was resected with  the LigaSure and sent with pathology as well. The area of the  anastomosis line of the upper abdomen in proper position with no torsion  or twisting in the bowel and correct mesenteric orientation. All  surgical sites were again checked for hemostasis. The ascites was  evacuated for the most part and the abdomen was then closed with running  loop 0-PDS suture, beginning superiorly and inferiorly and tying the  sutures in the mid wound. Local anesthetic was placed using Marcaine  and Exparel and the skin was closed with laura. A JORGE dressing was  placed. The patient was taken to the recovery room in stable condition  postop.         Isela Levy MD    D: 12/06/2021 19:56:24       T: 12/06/2021 20:02:19     [email or web address]  Job#: [id number]     Doc#: [id number]    CC:

## 2021-12-07 NOTE — BRIEF OP NOTE
Brief Postoperative Note      Patient: Arden Tong  YOB: 1960  MRN: [id number]    Date of Procedure: 12/6/2021    Pre-Op Diagnosis: Small Bowel Obstruction    Post-Op Diagnosis: Same       Procedure(s):  EXPLORATORY LAPAROTOMY, SMALL BOWEL RESECTION    Surgeon(s):  Marianela Spencer MD    Assistant:  Surgical Assistant: Dolly Dumont    Anesthesia: General    Estimated Blood Loss (mL): Minimal    Complications: None    Specimens:   ID Type Source Tests Collected by Time Destination   A : A) ILEOCOLONIC RESECTION, OMENTUM, ADDITIONAL SEGMENT OF BOWEL Tissue Tissue SURGICAL PATHOLOGY Marianela Spencer MD 12/6/2021 1837        Implants:  * No implants in log *      Drains:   NG/OG/NJ/NE Tube Nasogastric 18 fr Right nostril (Active)       Urethral Catheter Straight-tip 16 fr (Active)       [REMOVED] NG/OG/NJ/NE Tube Nasogastric Right nostril (Removed)   Surrounding Skin Dry; Intact 11/30/21 0125   Securement device Yes 11/30/21 0125   Status Suction-low intermittent 11/30/21 0125   Placement Verified by Gastric Contents 11/30/21 0125       [REMOVED] NG/OG/NJ/NE Tube Nasogastric 16 fr Right nostril (Removed)   Surrounding Skin Intact 12/01/21 0629   Status Suction-low intermittent 12/01/21 0629   NG/OG/NJ/NE External Measurement (cm) 70 cm 12/01/21 0629   Output (mL) 75 ml 12/01/21 1505       Findings: Intraabdominal carcinomatosis, SBO at anastomosis. Tumor thru omentum and many nodules on peritoneal and mesenteric surfaces, large volume ascites.     Ileocolonic resection and primary anastomosis completed, omentum resected, ascites evacuated     Electronically signed by Marianela Spencer MD on 12/6/2021 at 7:33 PM

## 2021-12-07 NOTE — PROGRESS NOTES
comfortable in the bed. He is alert and pleasant   Eyes: Sclera clear. Pupils equal.  ENT: Moist oral mucosa. Trachea midline, no adenopathy. Cardiovascular: Regular rhythm, normal S1, S2. No murmur. No edema in lower extremities  Respiratory: Not using accessory muscles. Good inspiratory effort. Clear to auscultation bilaterally, no wheeze or crackles. GI: Abdomen soft with bowel sounds present. NG with pale green output. Operative dressing is intact with old drainage noted. Musculoskeletal: No cyanosis in digits, neck supple  Neurology: CN 2-12 grossly intact. No speech or motor deficits  Psych: Normal affect. Alert and oriented in time, place and person  Skin: Warm, dry, normal turgor    Labs and Tests:  CBC:   Recent Labs     12/05/21  1557 12/06/21  1022 12/07/21  0457   WBC 13.9* 7.1 6.2   HGB 11.8* 9.6* 10.5*    125* 115*     BMP:    Recent Labs     12/05/21  1557 12/06/21  1022 12/07/21  0457   * 133* 135*   K 4.0 3.7 4.0   CL 95* 99 105   CO2 28 27  --    BUN 9 8 8   CREATININE 0.9 0.8 0.7*   GLUCOSE 112* 80 81     Hepatic:   Recent Labs     12/05/21  1557   AST 44*   ALT 21   BILITOT 1.3*   ALKPHOS 238*        CT abd/pelvis  1. Worsening small bowel obstruction with transition point at the ileocolic   anastomosis.  There is focal 2.2 cm area of wall thickening which may reflect   underlying stricture or mass.  No free air or pneumatosis. 2. Otherwise improved small bowel wall thickening. 3. Moderate to large volume ascites.  There is overall anasarca with   subcutaneous edema and pleural effusions as well. 4. New ground-glass attenuation and slightly increasing consolidative changes   within the lung bases suspicious for superimposed pneumonia.  Right lower   lobe mass is otherwise partially obscured due to atelectasis. 5. Hepatic steatosis.  No significant interval change in hepatic lesions.    6. Splenomegaly. Elgie Dowdy is a subtle area of low attenuation within the   lateral spleen, nonspecific and could be related to timing of the contrast   bolus; however, findings could also be related to developing splenic infarct. Problem List  Active Problems:    Bowel obstruction (HCC)    Carcinomatosis ([address]    Omental mass  Resolved Problems:    * No resolved hospital problems. *       Assessment & Plan:   1. Bowel Resection:  POD # 1 from Exploratory Lap with ileocolonic resection of distal ileum with prior hepatic flexure anastomosis with omental resection. Continue with NG decompression, IV hydration and supportive care. He is on zosyn   2. Uncontrolled pain:  I have increased morphine to 4 mg q 3 hours prn. Ambulation and IS encouraged   3.  Colon Cancer with carcinomatosis and intra abdominal ascites : oncology to follow       Diet: Diet NPO Exceptions are: Ice Chips  Code:Full Code  DVT PPX      VIJAY Anderson CNP   12/7/2021 8:36 AM

## 2021-12-07 NOTE — PROGRESS NOTES
[facility] and Laparoscopic Surgery        Progress Note    Patient Name: Luis Wilson  MRN: [id number]  YOB: 1960  Date of Evaluation: 2021    Subjective:  No acute events overnight  Pain not well controlled  No nausea or vomiting, NGT not to suction overnight  No flatus or BM, bloated   Resting in bed at this time    Post-Operative Day #1      Vital Signs:  Patient Vitals for the past 24 hrs:   BP Temp Temp src Pulse Resp SpO2   21 0830 108/69 97.7 °F (36.5 °C) Oral 87 16 95 %   21 0245 112/73 97.3 °F (36.3 °C) Temporal 91 14 95 %   21 2349 106/71 97.2 °F (36.2 °C) Temporal 84 14 96 %   21 2115 115/78 97.3 °F (36.3 °C) Temporal 79 12 95 %   215 114/76 97.4 °F (36.3 °C) Temporal 83 12 94 %   21 119/77   81 10 94 %   21 123/82   75 11 100 %   21 1950 125/77 96.8 °F (36 °C) Temporal 76 12 100 %   21 194 123/81   76 15 100 %   21 1940 126/78   77 12 100 %   21 1936 118/75 96.8 °F (36 °C) Temporal 80 16 100 %   21 1616 105/80 97.1 °F (36.2 °C) Temporal 88 18 99 %   21 1545 101/69 97.8 °F (36.6 °C) Oral 88 16 96 %      TEMPERATURE HISTORY 24H: Temp (24hrs), Av °F (35.6 °C), Min:94.6 °F (34.8 °C), Max:97.8 °F (36.6 °C)    BLOOD PRESSURE HISTORY: Systolic (11KFB), YWY:828 , Min:70 , VJN:919    Diastolic (90ZGQ), EUZ:97, Min:50, Max:88      Intake/Output:  I/O last 3 completed shifts: In: 1000 [I.V.:1000]  Out: 5273 [Urine:170; Other:4000]  No intake/output data recorded.   Drain/tube Output:       Physical Exam:  General: awake, alert, oriented to  person, place, time  Lungs: unlabored respirations  Abdomen: soft, mildly distended, incisional tenderness only, bowel sounds present   Skin/Wound: JORGE dressing in place--reinforced with Tegaderm resolving seal issue, small amount of old bloody drainage; abdominal binder in place    Labs:  CBC:    Recent Labs     21  1557 12/06/21  1022 12/07/21  0457   WBC 13.9* 7.1 6.2   HGB 11.8* 9.6* 10.5*   HCT 35.8* 28.8* 32.1*    125* 115*     BMP:    Recent Labs     12/05/21  1557 12/06/21  1022 12/07/21  0457   * 133* 135*   K 4.0 3.7 4.0   CL 95* 99 105   CO2 28 27  --    BUN 9 8 8   CREATININE 0.9 0.8 0.7*   GLUCOSE 112* 80 81     Hepatic:    Recent Labs     12/05/21  1557   AST 44*   ALT 21   BILITOT 1.3*   ALKPHOS 238*     Amylase:  No results found for: AMYLASE  Lipase:    Lab Results   Component Value Date    LIPASE 13.0 12/05/2021    LIPASE 10.0 11/29/2021      Mag:    Lab Results   Component Value Date    MG 2.20 12/02/2021    MG 1.70 12/02/2021     Phos:     Lab Results   Component Value Date    PHOS 2.7 12/01/2021    PHOS 3.4 10/14/2021      Coags:   Lab Results   Component Value Date    PROTIME 15.8 12/05/2021    INR 1.38 12/05/2021    APTT 40.9 12/01/2021       Cultures:  Anaerobic culture  No results found for: LABANAE  Fungus stain  No results found for requested labs within last 30 days. Gram stain  No results found for requested labs within last 30 days. Organism  No results found for: [facility] [place]  Surgical culture  No results found for: CXSURG  Blood culture 1  Results in Past 30 Days  Result Component Current Result Ref Range Previous Result Ref Range   Blood Culture, Routine No Growth to date. Any change in status will be called. (12/5/2021)  Not in Time Range      Blood culture 2  Results in Past 30 Days  Result Component Current Result Ref Range Previous Result Ref Range   Culture, Blood 2 No Growth to date. Any change in status will be called. (12/5/2021)  Not in Time Range      Fecal occult  No results found for requested labs within last 30 days. GI bacterial pathogens by PCR  Results in Past 30 Days  Result Component Current Result Ref Range Previous Result Ref Range   GI Bacterial Pathogens By PCR No Shigella spp/EIEC DNA detected  No Shiga toxin-producing gene(s) detected  No Campylobacter spp. (jejuni and coli)DNA detected  No Salmonella spp. DNA detected  Normal Range:  None detected   (11/30/2021)  Not in Time Range      C. difficile  No results found for requested labs within last 30 days. Urine culture  Lab Results   Component Value Date    LABURIN No growth at 18-36 hours 04/12/2018       Pathology:  Pathology results pending     Imaging:  I have personally reviewed the following films:    CT ABDOMEN PELVIS W IV CONTRAST Additional Contrast? Oral    Result Date: 12/5/2021  EXAMINATION: CT OF THE ABDOMEN AND PELVIS WITH CONTRAST 12/5/2021 5:26 pm TECHNIQUE: CT of the abdomen and pelvis was performed with the administration of intravenous contrast. Multiplanar reformatted images are provided for review. Dose modulation, iterative reconstruction, and/or weight based adjustment of the mA/kV was utilized to reduce the radiation dose to as low as reasonably achievable. COMPARISON: 11/29/2021, 10/07/2021 HISTORY: ORDERING SYSTEM PROVIDED HISTORY: IV+PO contrast, n/v, constipation, recent bowel obstruction, distented now and sx similar TECHNOLOGIST PROVIDED HISTORY: Additional Contrast?->Oral Reason for exam:->IV+PO contrast, n/v, constipation, recent bowel obstruction, distented now and sx similar Decision Support Exception - unselect if not a suspected or confirmed emergency medical condition->Emergency Medical Condition (MA) Reason for Exam: IV+PO contrast, n/v, constipation, recent bowel obstruction, distented now and sx similar Acuity: Acute Type of Exam: Initial FINDINGS: Lower Chest: Small right pleural effusion. Consolidative opacities within the right lower lobe and middle lobe with associated volume loss. Patchy ground-glass opacities are also noted within the lung bases predominately within the right and left lower lobes.   This has slightly progressed since the prior exam.  Persistent mass-like area posterior to the right hilum is again noted, but otherwise difficult to measure due to atelectasis in this region. Organs: There is diffuse low-attenuation of the liver. Hepatic lesions are again identified, stable, the largest measuring up to 3.6 x 2.5 cm in segment 3. Right hepatic dome lesions are otherwise stable. Spleen is enlarged measuring up to 15 cm in the craniocaudal dimension. There is a subtle area of low attenuation within the lateral spleen near the dome (for example image 44) not well seen on the prior exam.  The pancreas, gallbladder and adrenal glands are without focal abnormality. No renal or ureteral calculus. No hydronephrosis or perinephric stranding. The kidneys enhance symmetrically. GI/Bowel: Postsurgical changes from right hemicolectomy. The small bowel is diffusely dilated which has overall progressed since the prior exam.  The transition point is at the ileocolic anastomosis, where there is focal wall thickening measuring approximately 2.2 cm in length. The distal colon is relatively collapsed. Colonic diverticulosis without acute diverticulitis. No free air or extraluminal contrast.  Small bowel dilation measures up to a maximum 4.3 cm. Small bowel wall thickening seen on the prior exam is less apparent on today's exam.  No pneumatosis. Pelvis: Moderate volume ascites. Bladder is decompressed. No pathologically enlarged adenopathy. Peritoneum/Retroperitoneum: The aorta is normal caliber. The celiac axis, SMA and TERRI are patent. The portal venous system is patent. Moderate to large volume ascites. There is mild stranding within the omentum in the left upper quadrant, some of which could be related to the volume of ascites. Upper abdominal adenopathy is not significantly changed from the prior exam, including a 2.7 x 1.0 cm portacaval lymph node and a 1.7 x 1.6 cm periceliac lymph node. Bones/Soft Tissues: Subcutaneous edema. No acute osseous abnormality. 1. Worsening small bowel obstruction with transition point at the ileocolic anastomosis.   There is focal 2.2 cm area of wall thickening which may reflect underlying stricture or mass. No free air or pneumatosis. 2. Otherwise improved small bowel wall thickening. 3. Moderate to large volume ascites. There is overall anasarca with subcutaneous edema and pleural effusions as well. 4. New ground-glass attenuation and slightly increasing consolidative changes within the lung bases suspicious for superimposed pneumonia. Right lower lobe mass is otherwise partially obscured due to atelectasis. 5. Hepatic steatosis. No significant interval change in hepatic lesions. 6. Splenomegaly. There is a subtle area of low attenuation within the lateral spleen, nonspecific and could be related to timing of the contrast bolus; however, findings could also be related to developing splenic infarct. XR CHEST PORTABLE    Result Date: 12/5/2021  EXAMINATION: ONE XRAY VIEW OF THE CHEST 12/5/2021 3:46 pm COMPARISON: 07/02/2020 HISTORY: ORDERING SYSTEM PROVIDED HISTORY: cough, fluid overload TECHNOLOGIST PROVIDED HISTORY: Reason for exam:->cough, fluid overload Reason for Exam: Abdominal Pain (Pt states he has had belly pain since last thursday, and also vomiting. Feels like he is constipated as well. Hx colon cancer) FINDINGS: Left-sided central venous catheter remains in place. Cardiomediastinal silhouette stable. New right parahilar airspace opacity. No pneumothorax. No pleural effusion. New right parahilar airspace opacity suggesting pneumonia     XR ABDOMEN FOR NG/OG/NE TUBE PLACEMENT    Result Date: 12/5/2021  EXAMINATION: ONE SUPINE XRAY VIEW(S) OF THE ABDOMEN 12/5/2021 3:39 pm COMPARISON: None. HISTORY: ORDERING SYSTEM PROVIDED HISTORY: NG placement TECHNOLOGIST PROVIDED HISTORY: Reason for exam:->NG placement Portable? ->Yes Reason for Exam: NG placement Acuity: Acute Type of Exam: Initial FINDINGS: Enteric tube is noted.   The tip is superimposed over the gastric body; however, the side port is superimposed over the expected location of the gastroesophageal junction. Bibasilar atelectasis is noted. Gaseous dilation of small bowel loops is seen. The tip of the enteric tube is in the expected position, superimposed over the gastric body. However, the side port is superimposed over the expected location of the gastroesophageal junction. Consequently, the tube should be advanced approximately 4-6 additional cm and reimaged prior to use. Scheduled Meds:   piperacillin-tazobactam  3,375 mg IntraVENous Q8H    sodium chloride flush  5-40 mL IntraVENous 2 times per day    enoxaparin  40 mg SubCUTAneous Daily     Continuous Infusions:   sodium chloride 100 mL/hr at 12/06/21 1212    sodium chloride       PRN Meds:.morphine, LORazepam, sodium chloride flush, sodium chloride, ondansetron **OR** ondansetron, polyethylene glycol, acetaminophen **OR** acetaminophen      Assessment:  57 y.o. male admitted with   1. SBO (small bowel obstruction) ([facility] [address]    2. Pneumonia due to infectious organism, unspecified laterality, unspecified part of lung    3. Severe sepsis (HCC)        Status-post exploratory laparotomy, ileocolonic resection of distal ileum and prior hepatic flexure region anastomosis, omental resection on 12/6/2021 for small bowel obstruction, history of colon cancer, intra-abdominal ascites, carcinomatosis and small bowel obstruction from recurrent cancer  Pneumonia  Colon cancer with liver and lung metastasis  Hypertension       Plan:  1. Remove Delvalle catheter; keep JORGE dressing in place  2. NPO with NGT decompression; monitor bowel function  3. IV hydration; monitor and correct electrolytes  4. Antibiotics--on Zosyn  5. Activity as tolerated, ambulate TID  6. Pulmonary toilet, incentive spirometry  7. PRN analgesics and antiemetics--minimizing narcotics as tolerated  8. DVT prophylaxis with Lovenox and SCD's  9.  Management of medical comorbid etiologies per primary team and consulting services    EDUCATION:  Educated patient on plan of care and disease process--all questions answered. Plans discussed with patient and nursing. Reviewed and discussed with Dr. Yady Batista.       Signed:  VIJAY Scott - CNP  12/7/2021 11:40 AM     Surgery Staff:     Pt seen and examined with NP  See full note above  Looking good POD 1   Will need to cont NG today, was very dil SB  Delvalle out, OOB  Findings D/W Dr. Bushra Patricio and with pt today    Chantell Bay MD

## 2021-12-07 NOTE — PROGRESS NOTES
Pt received from PACU. A&ox4. VSS. NG-tube still in place. Midline incision with JORGE dressing. 2 drainage spots upper and lower. Approx 1 inch drainage noted upon receiving. Marked and timed. Pt on RA at this time. Will continue to monitor.

## 2021-12-07 NOTE — ANESTHESIA POSTPROCEDURE EVALUATION
Department of Anesthesiology  Postprocedure Note    Patient: Luis Wilson  MRN: [id number]  YOB: 1960  Date of evaluation: 12/6/2021  Time:  7:52 PM     Procedure Summary     Date: 12/06/21 Room / Location: [address]    Anesthesia Start: 2041 Anesthesia Stop: 1941    Procedure: EXPLORATORY LAPAROTOMY, ILEOCOLONIC BOWEL RESECTION, OMENTECTOMY (N/A Abdomen) Diagnosis: (Small Bowel Obstruction)    Surgeons: Holly Lisa MD Responsible Provider: Adeline Earl MD    Anesthesia Type: general ASA Status: 3 - Emergent          Anesthesia Type: general    Vince Phase I: Vince Score: 5    Vince Phase II:      Last vitals: Reviewed and per EMR flowsheets.        Anesthesia Post Evaluation    Patient location during evaluation: PACU  Patient participation: complete - patient participated  Level of consciousness: awake and alert  Airway patency: patent  Nausea & Vomiting: no vomiting and no nausea  Complications: no  Cardiovascular status: hemodynamically stable  Respiratory status: acceptable  Hydration status: stable

## 2021-12-07 NOTE — PROGRESS NOTES
Patient asleep, wakes easily, VSS, abdominal dressing with 2 areas of bloody dressing, ezio dressing box blinking green, patient rates pain 4/10, phase I discharge criteria met, will transfer back to .

## 2021-12-07 NOTE — PROGRESS NOTES
Patient transferred from OR to PACU, not yet responding to physical stimuli, oral airway in place, VSS, abdominal ezio dressing with 2 small areas of bloody drainage, will monitor.

## 2021-12-08 LAB
ANION GAP SERPL CALCULATED.3IONS-SCNC: 9 MMOL/L (ref 3–16)
BUN BLDV-MCNC: 9 MG/DL (ref 7–20)
CALCIUM SERPL-MCNC: 8 MG/DL (ref 8.3–10.6)
CHLORIDE BLD-SCNC: 105 MMOL/L (ref 99–110)
CO2: 24 MMOL/L (ref 21–32)
CREAT SERPL-MCNC: 0.7 MG/DL (ref 0.8–1.3)
GFR AFRICAN AMERICAN: >60
GFR NON-AFRICAN AMERICAN: >60
GLUCOSE BLD-MCNC: 81 MG/DL (ref 70–99)
HCT VFR BLD CALC: 29.6 % (ref 40.5–52.5)
HEMOGLOBIN: 10 G/DL (ref 13.5–17.5)
MCH RBC QN AUTO: 33.7 PG (ref 26–34)
MCHC RBC AUTO-ENTMCNC: 33.7 G/DL (ref 31–36)
MCV RBC AUTO: 100.1 FL (ref 80–100)
PDW BLD-RTO: 17.6 % (ref 12.4–15.4)
PLATELET # BLD: 200 K/UL (ref 135–450)
PMV BLD AUTO: 7.3 FL (ref 5–10.5)
POTASSIUM SERPL-SCNC: 3.7 MMOL/L (ref 3.5–5.1)
RBC # BLD: 2.96 M/UL (ref 4.2–5.9)
SODIUM BLD-SCNC: 138 MMOL/L (ref 136–145)
WBC # BLD: 8.9 K/UL (ref 4–11)

## 2021-12-08 PROCEDURE — 80048 BASIC METABOLIC PNL TOTAL CA: CPT

## 2021-12-08 PROCEDURE — 1200000000 HC SEMI PRIVATE

## 2021-12-08 PROCEDURE — 6360000002 HC RX W HCPCS: Performed by: FAMILY MEDICINE

## 2021-12-08 PROCEDURE — APPNB30 APP NON BILLABLE TIME 0-30 MINS: Performed by: NURSE PRACTITIONER

## 2021-12-08 PROCEDURE — APPSS15 APP SPLIT SHARED TIME 0-15 MINUTES: Performed by: NURSE PRACTITIONER

## 2021-12-08 PROCEDURE — 85027 COMPLETE CBC AUTOMATED: CPT

## 2021-12-08 PROCEDURE — 2580000003 HC RX 258: Performed by: FAMILY MEDICINE

## 2021-12-08 PROCEDURE — 6360000002 HC RX W HCPCS: Performed by: NURSE PRACTITIONER

## 2021-12-08 PROCEDURE — 99024 POSTOP FOLLOW-UP VISIT: CPT | Performed by: SURGERY

## 2021-12-08 RX ADMIN — ENOXAPARIN SODIUM 40 MG: 100 INJECTION SUBCUTANEOUS at 09:36

## 2021-12-08 RX ADMIN — PIPERACILLIN AND TAZOBACTAM 3375 MG: 3; .375 INJECTION, POWDER, LYOPHILIZED, FOR SOLUTION INTRAVENOUS at 09:36

## 2021-12-08 RX ADMIN — HYDROMORPHONE HYDROCHLORIDE 1 MG: 1 INJECTION, SOLUTION INTRAMUSCULAR; INTRAVENOUS; SUBCUTANEOUS at 04:50

## 2021-12-08 RX ADMIN — PIPERACILLIN AND TAZOBACTAM 3375 MG: 3; .375 INJECTION, POWDER, LYOPHILIZED, FOR SOLUTION INTRAVENOUS at 16:40

## 2021-12-08 RX ADMIN — HYDROMORPHONE HYDROCHLORIDE 1 MG: 1 INJECTION, SOLUTION INTRAMUSCULAR; INTRAVENOUS; SUBCUTANEOUS at 18:48

## 2021-12-08 RX ADMIN — HYDROMORPHONE HYDROCHLORIDE 1 MG: 1 INJECTION, SOLUTION INTRAMUSCULAR; INTRAVENOUS; SUBCUTANEOUS at 15:41

## 2021-12-08 RX ADMIN — Medication 10 ML: at 09:37

## 2021-12-08 RX ADMIN — HYDROMORPHONE HYDROCHLORIDE 1 MG: 1 INJECTION, SOLUTION INTRAMUSCULAR; INTRAVENOUS; SUBCUTANEOUS at 01:02

## 2021-12-08 RX ADMIN — HYDROMORPHONE HYDROCHLORIDE 1 MG: 1 INJECTION, SOLUTION INTRAMUSCULAR; INTRAVENOUS; SUBCUTANEOUS at 12:41

## 2021-12-08 RX ADMIN — HYDROMORPHONE HYDROCHLORIDE 1 MG: 1 INJECTION, SOLUTION INTRAMUSCULAR; INTRAVENOUS; SUBCUTANEOUS at 22:01

## 2021-12-08 RX ADMIN — HYDROMORPHONE HYDROCHLORIDE 1 MG: 1 INJECTION, SOLUTION INTRAMUSCULAR; INTRAVENOUS; SUBCUTANEOUS at 09:40

## 2021-12-08 RX ADMIN — PIPERACILLIN AND TAZOBACTAM 3375 MG: 3; .375 INJECTION, POWDER, LYOPHILIZED, FOR SOLUTION INTRAVENOUS at 01:02

## 2021-12-08 ASSESSMENT — PAIN DESCRIPTION - LOCATION
LOCATION: ABDOMEN

## 2021-12-08 ASSESSMENT — PAIN SCALES - GENERAL
PAINLEVEL_OUTOF10: 7
PAINLEVEL_OUTOF10: 8
PAINLEVEL_OUTOF10: 7
PAINLEVEL_OUTOF10: 8
PAINLEVEL_OUTOF10: 8

## 2021-12-08 ASSESSMENT — PAIN DESCRIPTION - PAIN TYPE
TYPE: ACUTE PAIN

## 2021-12-08 ASSESSMENT — PAIN DESCRIPTION - PROGRESSION
CLINICAL_PROGRESSION: GRADUALLY IMPROVING

## 2021-12-08 ASSESSMENT — PAIN DESCRIPTION - ORIENTATION: ORIENTATION: LEFT

## 2021-12-08 NOTE — PROGRESS NOTES
[facility] and Laparoscopic Surgery        Progress Note    Patient Name: Jez Garrison  MRN: [id number]  YOB: 1960  Date of Evaluation: 2021    Subjective:  No acute events overnight  Pain controlled  No nausea or vomiting, NGT in place  Passing flatus, no stool, denies feeling bloated   Resting in bed at this time    Post-Operative Day #2      Vital Signs:  Patient Vitals for the past 24 hrs:   BP Temp Temp src Pulse Resp SpO2   21 1223 115/69 97.6 °F (36.4 °C) Axillary 85 16 93 %   21 0450 121/80        21 0445 103/70 98.2 °F (36.8 °C) Temporal 97 16 92 %   21 0045 117/75 97.8 °F (36.6 °C) Temporal 94 16 94 %   21 2101 109/75 97.8 °F (36.6 °C) Oral 92 16 95 %   21 1648 100/66 97.8 °F (36.6 °C) Oral 94 16 95 %      TEMPERATURE HISTORY 24H: Temp (24hrs), Av.8 °F (36.6 °C), Min:97.6 °F (36.4 °C), Max:98.2 °F (36.8 °C)    BLOOD PRESSURE HISTORY: Systolic (22FJQ), AWL:758 , Min:100 , EMR:813    Diastolic (87PVW), MRD:89, Min:66, Max:80      Intake/Output:  No intake/output data recorded. No intake/output data recorded. Drain/tube Output:       Physical Exam:  General: awake, alert, oriented to person, place, time  Lungs: unlabored respirations  Abdomen: soft, non-distended, incisional tenderness only, bowel sounds present   Skin/Wound: JORGE dressing in place--sealed well, small amount of old bloody drainage; abdominal binder in place    Labs:  CBC:    Recent Labs     21  1022 21  0457 21  0517   WBC 7.1 6.2 8.9   HGB 9.6* 10.5* 10.0*   HCT 28.8* 32.1* 29.6*   * 115* 200     BMP:    Recent Labs     21  1557 21  1557 21  1022 21  0457 21  0517   *   < > 133* 135* 138   K 4.0   < > 3.7 4.0 3.7   CL 95*   < > 99 105 105   CO2 28  --  27  --  24   BUN 9   < > 8 8 9   CREATININE 0.9   < > 0.8 0.7* 0.7*   GLUCOSE 112*   < > 80 81 81    < > = values in this interval not displayed. Hepatic:    Recent Labs     12/05/21  1557   AST 44*   ALT 21   BILITOT 1.3*   ALKPHOS 238*     Amylase:  No results found for: AMYLASE  Lipase:    Lab Results   Component Value Date    LIPASE 13.0 12/05/2021    LIPASE 10.0 11/29/2021      Mag:    Lab Results   Component Value Date    MG 2.20 12/02/2021    MG 1.70 12/02/2021     Phos:     Lab Results   Component Value Date    PHOS 2.7 12/01/2021    PHOS 3.4 10/14/2021      Coags:   Lab Results   Component Value Date    PROTIME 15.8 12/05/2021    INR 1.38 12/05/2021    APTT 40.9 12/01/2021       Cultures:  Anaerobic culture  No results found for: LABANAE  Fungus stain  No results found for requested labs within last 30 days. Gram stain  No results found for requested labs within last 30 days. Organism  No results found for: [facility]  Surgical culture  No results found for: CXSURG  Blood culture 1  Results in Past 30 Days  Result Component Current Result Ref Range Previous Result Ref Range   Blood Culture, Routine No Growth to date. Any change in status will be called. (12/5/2021)  Not in Time Range      Blood culture 2  Results in Past 30 Days  Result Component Current Result Ref Range Previous Result Ref Range   Culture, Blood 2 No Growth to date. Any change in status will be called. (12/5/2021)  Not in Time Range      Fecal occult  No results found for requested labs within last 30 days. GI bacterial pathogens by PCR  Results in Past 30 Days  Result Component Current Result Ref Range Previous Result Ref Range   GI Bacterial Pathogens By PCR No Shigella spp/EIEC DNA detected  No Shiga toxin-producing gene(s) detected  No Campylobacter spp. (jejuni and coli)DNA detected  No Salmonella spp. DNA detected  Normal Range:  None detected   (11/30/2021)  Not in Time Range      C. difficile  No results found for requested labs within last 30 days.      Urine culture  Lab Results   Component Value Date    LABURIN No growth at 18-36 hours 04/12/2018 Pathology:  OR 12/6/2021--FINAL DIAGNOSIS:     Ileocolonic segmental resection and omental resection:   - Invasive colonic adenocarcinoma, moderately differentiated. - Margins not involved. - One pericolonic lymph nodes positive for metastatic carcinoma (1/13). - Omentum involved by adenocarcinoma. Procedure: Segmental resection of ileocolonic anastomosis   Tumor Site: ileocolonic anastomosis site   Tumor Size: Greatest dimension (centimeter): 3.7        Additional dimensions (centimeters): 2.2 x 0.5   Macroscopic tumor perforation: Not identified   Histologic Type: Adenocarcinoma   Histologic Grade: G2 moderate differentiated   Tumor Extension: Tumor invades through muscularis propria into subserosal   adipose tissue. MARGINS   All margins are uninvolved by invasive carcinoma, high grade dysplasia /   intramucosal carcinoma, and low grade dysplasia      Margins examined: Proximal, distal, and radial    + Distance of invasive carcinoma from closest margin (millimeters or   centimeters): 30 mm    + Specify closest margin: Radial     Treatment Effect (applicable to carcinomas treated with neoadjuvant   therapy): No known pre surgical treatment   Lymph-Vascular Invasion: Not identified   Perineural Invasion: Not identified   + Tumor Budding (Note I)         Low score (0-4)   Type of Polyp in Which Invasive Carcinoma Arose: Not applicable   Tumor deposits: Present     Regional lymph nodes     Number of Lymph nodes Involved: 1     Number of Lymph Nodes Examined: 13     Pathologic Stage Classification (pTNM, AJCC 8th Edition)     TNM Descriptors (select all that apply)         r (recurrent)     Primary Tumor (pT):      pT3: Tumor invades through muscularis propria into pericolorectal   tissue     Regional Lymph Nodes (pN):      pN1a: One regional lymph node is positive     pM Category      pM1     + Additional pathologic findings: Not applicable.    + Ancillary studies: MSI pending     Imaging:  I have personally reviewed the following films:    No results found. Scheduled Meds:   piperacillin-tazobactam  3,375 mg IntraVENous Q8H    sodium chloride flush  5-40 mL IntraVENous 2 times per day    enoxaparin  40 mg SubCUTAneous Daily     Continuous Infusions:   sodium chloride 100 mL/hr at 12/07/21 1516    sodium chloride       PRN Meds:.morphine, HYDROmorphone, LORazepam, sodium chloride flush, sodium chloride, ondansetron **OR** ondansetron, polyethylene glycol, acetaminophen **OR** acetaminophen      Assessment:  57 y.o. male admitted with   1. SBO (small bowel obstruction) ([facility] [address]    2. Pneumonia due to infectious organism, unspecified laterality, unspecified part of lung    3. Severe sepsis (HCC)        Status-post exploratory laparotomy, ileocolonic resection of distal ileum and prior hepatic flexure region anastomosis, omental resection on 12/6/2021 for small bowel obstruction, history of colon cancer, intra-abdominal ascites, carcinomatosis and small bowel obstruction from recurrent cancer  Pneumonia  Colon cancer with liver and lung metastasis  Hypertension       Plan:  1. Keep JORGE dressing in place  2. Continue NPO with NGT decompression; monitor bowel function--passing flatus  3. IV hydration; monitor and correct electrolytes  4. Antibiotics--on Zosyn  5. Activity as tolerated, ambulate TID  6. Pulmonary toilet, incentive spirometry  7. PRN analgesics and antiemetics--minimizing narcotics as tolerated  8. DVT prophylaxis with Lovenox and SCD's  9. Management of medical comorbid etiologies per primary team and consulting services    EDUCATION:  Educated patient on plan of care and disease process--all questions answered. Plans discussed with patient and nursing. Reviewed and discussed with Dr. Malcom Vallejo.       Signed:  VIJAY Gutierrez CNP  12/8/2021 2:39 PM     Surgery Staff:     Pt seen and examined with NP  See full note above  Looking good overall post op  Will likely be able to remove NG tomorrow, uncertain on outputs last 24 hours  Abd and exam look fine  Path and findings reviewed with Dr. Bhavani Penaloza, he will plan some adjuvant treatment post recovery      Lisa Murray MD

## 2021-12-08 NOTE — PROGRESS NOTES
Assessment complete. A*ox4. No new drainage noted on incision site. Pt ambulated in hallway. Still rating pain at 7-8/10. On RA. Will continue to monitor.

## 2021-12-08 NOTE — PROGRESS NOTES
Physician Progress Note      PATIENT:               Jenny Diallo  CSN #:                  [id number]  :                       1960  ADMIT DATE:       2021 3:34 PM  [address] [place] DATE:  RESPONDING  PROVIDER #:        Nicolas Gaines CNP          QUERY TEXT:    Pt admitted with Small bowel obstruction. Pt noted to have pneumonia. If   possible, please document in the progress notes and discharge summary if you   are evaluating and /or treating any of the following: The medical record reflects the following:  Risk Factors: Emesis  Clinical Indicators: Patient presented to the ED with concerns for abdominal   pain, vomiting and constipation. History of colon cancer with metastasis to   liver and lung, recently hospitalized with a small bowel obstruction, treated   medically. CXR with new right perihilar airspace opacity suggesting   pneumonia. On admit WBC of 13.9, Procalcitonin of 0.31 with tachycardia,   Lactic Acid of 2.3. NG placed, Zosyn started, sepsis protocols started in the   ED. Treatment: NPO, IV fluids, Imaging, Lab work, Zosyn. Options provided:  -- Sepsis, present on admission  -- Pneumonia without Sepsis  -- Sepsis was ruled out  -- Other - I will add my own diagnosis  -- Disagree - Not applicable / Not valid  -- Disagree - Clinically unable to determine / Unknown  -- Refer to Clinical Documentation Reviewer    PROVIDER RESPONSE TEXT:    Provider is clinically unable to determine a response to this query.     Query created by: Ha Preston on 2021 12:57 PM      Electronically signed by:  Rebekah Gaines CNP 2021 2:16   PM

## 2021-12-08 NOTE — PROGRESS NOTES
[facility]    12/8/2021 8:13 AM        Name: Cheng Nj . Admitted: 12/5/2021  Primary Care Provider: Floyd Rose MD (Tel: [phone])      Subjective:  . POD # 2 from  Exploratory laparotomy, ileocolonic resection of distal  ileum and prior hepatic flexure region anastomosis, omental resection.     Seen this am   He is in good spirits  Has already been up walking this am.  Also walked laps in the [place] last evening  Dilaudid is helping the pain         Reviewed interval ancillary notes    Current Medications  morphine injection 4 mg, Q3H PRN  HYDROmorphone HCl PF (DILAUDID) injection 1 mg, Q3H PRN  0.9 % sodium chloride infusion, Continuous  piperacillin-tazobactam (ZOSYN) 3,375 mg in dextrose 5 % 50 mL IVPB extended infusion (mini-bag), Q8H  LORazepam (ATIVAN) injection 0.5 mg, Q6H PRN  sodium chloride flush 0.9 % injection 5-40 mL, 2 times per day  sodium chloride flush 0.9 % injection 5-40 mL, PRN  0.9 % sodium chloride infusion, PRN  enoxaparin (LOVENOX) injection 40 mg, Daily  ondansetron (ZOFRAN-ODT) disintegrating tablet 4 mg, Q8H PRN   Or  ondansetron (ZOFRAN) injection 4 mg, Q6H PRN  polyethylene glycol (GLYCOLAX) packet 17 g, Daily PRN  acetaminophen (TYLENOL) tablet 650 mg, Q6H PRN   Or  acetaminophen (TYLENOL) suppository 650 mg, Q6H PRN        Objective:  /80   Pulse 97   Temp 98.2 °F (36.8 °C) (Temporal)   Resp 16   Ht 5' 11\" (1.803 m)   Wt 179 lb 3.2 oz (81.3 kg)   SpO2 92%   BMI 24.99 kg/m²   No intake or output data in the 24 hours ending 12/08/21 0813   Wt Readings from Last 3 Encounters:   12/05/21 179 lb 3.2 oz (81.3 kg)   12/01/21 169 lb 6.4 oz (76.8 kg)   10/13/21 175 lb 3.2 oz (79.5 kg)       General appearance:  Appears comfortable in the bed. He is alert and pleasant   Eyes: Sclera clear. Pupils equal.  ENT: Moist oral mucosa.  Trachea midline, no lateral spleen, nonspecific and could be related to timing of the contrast   bolus; however, findings could also be related to developing splenic infarct. Problem List  Active Problems:    Bowel obstruction (HCC)    Carcinomatosis ([address]    Omental mass  Resolved Problems:    * No resolved hospital problems. *       Assessment & Plan:   1. Bowel Resection:  POD # 2 from Exploratory Lap with ileocolonic resection of distal ileum with prior hepatic flexure anastomosis with omental resection. Continue with NG decompression, IV hydration and supportive care. He is on zosyn   ( I do not think he has any PNA) He has been walking and using IS   2. Uncontrolled pain:  I have added IV dilaudid. Ambulation and IS encouraged   3.  Colon Cancer with carcinomatosis and intra abdominal ascites : oncology to follow       Diet: Diet NPO Exceptions are: Ice Chips  Code:Full Code  DVT PPX      VIJAY Garcia CNP   12/8/2021 8:13 AM

## 2021-12-09 LAB
ANION GAP SERPL CALCULATED.3IONS-SCNC: 9 MMOL/L (ref 3–16)
BLOOD CULTURE, ROUTINE: NORMAL
BUN BLDV-MCNC: 12 MG/DL (ref 7–20)
CALCIUM SERPL-MCNC: 7.5 MG/DL (ref 8.3–10.6)
CHLORIDE BLD-SCNC: 106 MMOL/L (ref 99–110)
CO2: 25 MMOL/L (ref 21–32)
CREAT SERPL-MCNC: 0.6 MG/DL (ref 0.8–1.3)
CULTURE, BLOOD 2: NORMAL
GFR AFRICAN AMERICAN: >60
GFR NON-AFRICAN AMERICAN: >60
GLUCOSE BLD-MCNC: 44 MG/DL (ref 70–99)
GLUCOSE BLD-MCNC: 49 MG/DL (ref 70–99)
GLUCOSE BLD-MCNC: 51 MG/DL (ref 70–99)
GLUCOSE BLD-MCNC: 84 MG/DL (ref 70–99)
HCT VFR BLD CALC: 26.9 % (ref 40.5–52.5)
HEMOGLOBIN: 9 G/DL (ref 13.5–17.5)
MCH RBC QN AUTO: 33.5 PG (ref 26–34)
MCHC RBC AUTO-ENTMCNC: 33.4 G/DL (ref 31–36)
MCV RBC AUTO: 100.4 FL (ref 80–100)
PDW BLD-RTO: 17.1 % (ref 12.4–15.4)
PERFORMED ON: ABNORMAL
PERFORMED ON: ABNORMAL
PERFORMED ON: NORMAL
PLATELET # BLD: 170 K/UL (ref 135–450)
PMV BLD AUTO: 7.2 FL (ref 5–10.5)
POTASSIUM SERPL-SCNC: 3.5 MMOL/L (ref 3.5–5.1)
RBC # BLD: 2.68 M/UL (ref 4.2–5.9)
SODIUM BLD-SCNC: 140 MMOL/L (ref 136–145)
WBC # BLD: 6.3 K/UL (ref 4–11)

## 2021-12-09 PROCEDURE — 6360000002 HC RX W HCPCS: Performed by: SURGERY

## 2021-12-09 PROCEDURE — 2500000003 HC RX 250 WO HCPCS: Performed by: NURSE PRACTITIONER

## 2021-12-09 PROCEDURE — 6360000002 HC RX W HCPCS: Performed by: FAMILY MEDICINE

## 2021-12-09 PROCEDURE — 2580000003 HC RX 258: Performed by: FAMILY MEDICINE

## 2021-12-09 PROCEDURE — 80048 BASIC METABOLIC PNL TOTAL CA: CPT

## 2021-12-09 PROCEDURE — 6360000002 HC RX W HCPCS: Performed by: NURSE PRACTITIONER

## 2021-12-09 PROCEDURE — 99024 POSTOP FOLLOW-UP VISIT: CPT | Performed by: SURGERY

## 2021-12-09 PROCEDURE — 1200000000 HC SEMI PRIVATE

## 2021-12-09 PROCEDURE — 85027 COMPLETE CBC AUTOMATED: CPT

## 2021-12-09 RX ORDER — DEXTROSE MONOHYDRATE 50 MG/ML
100 INJECTION, SOLUTION INTRAVENOUS PRN
Status: DISCONTINUED | OUTPATIENT
Start: 2021-12-09 | End: 2021-12-17 | Stop reason: HOSPADM

## 2021-12-09 RX ORDER — KETOROLAC TROMETHAMINE 15 MG/ML
15 INJECTION, SOLUTION INTRAMUSCULAR; INTRAVENOUS EVERY 6 HOURS PRN
Status: DISPENSED | OUTPATIENT
Start: 2021-12-09 | End: 2021-12-14

## 2021-12-09 RX ORDER — METOCLOPRAMIDE HYDROCHLORIDE 5 MG/ML
10 INJECTION INTRAMUSCULAR; INTRAVENOUS EVERY 6 HOURS
Status: COMPLETED | OUTPATIENT
Start: 2021-12-09 | End: 2021-12-10

## 2021-12-09 RX ORDER — NICOTINE POLACRILEX 4 MG
15 LOZENGE BUCCAL PRN
Status: DISCONTINUED | OUTPATIENT
Start: 2021-12-09 | End: 2021-12-17 | Stop reason: HOSPADM

## 2021-12-09 RX ORDER — HYDROMORPHONE HYDROCHLORIDE 1 MG/ML
0.5 INJECTION, SOLUTION INTRAMUSCULAR; INTRAVENOUS; SUBCUTANEOUS
Status: DISCONTINUED | OUTPATIENT
Start: 2021-12-09 | End: 2021-12-15

## 2021-12-09 RX ORDER — MORPHINE SULFATE 2 MG/ML
2 INJECTION, SOLUTION INTRAMUSCULAR; INTRAVENOUS
Status: DISCONTINUED | OUTPATIENT
Start: 2021-12-09 | End: 2021-12-15

## 2021-12-09 RX ORDER — DEXTROSE MONOHYDRATE 25 G/50ML
12.5 INJECTION, SOLUTION INTRAVENOUS PRN
Status: DISCONTINUED | OUTPATIENT
Start: 2021-12-09 | End: 2021-12-17 | Stop reason: HOSPADM

## 2021-12-09 RX ORDER — DEXTROSE, SODIUM CHLORIDE, AND POTASSIUM CHLORIDE 5; .45; .15 G/100ML; G/100ML; G/100ML
INJECTION INTRAVENOUS CONTINUOUS
Status: DISCONTINUED | OUTPATIENT
Start: 2021-12-09 | End: 2021-12-15

## 2021-12-09 RX ADMIN — ENOXAPARIN SODIUM 40 MG: 100 INJECTION SUBCUTANEOUS at 08:48

## 2021-12-09 RX ADMIN — PIPERACILLIN AND TAZOBACTAM 3375 MG: 3; .375 INJECTION, POWDER, LYOPHILIZED, FOR SOLUTION INTRAVENOUS at 17:15

## 2021-12-09 RX ADMIN — PIPERACILLIN AND TAZOBACTAM 3375 MG: 3; .375 INJECTION, POWDER, LYOPHILIZED, FOR SOLUTION INTRAVENOUS at 01:24

## 2021-12-09 RX ADMIN — POTASSIUM CHLORIDE, DEXTROSE MONOHYDRATE AND SODIUM CHLORIDE: 150; 5; 450 INJECTION, SOLUTION INTRAVENOUS at 08:48

## 2021-12-09 RX ADMIN — HYDROMORPHONE HYDROCHLORIDE 1 MG: 1 INJECTION, SOLUTION INTRAMUSCULAR; INTRAVENOUS; SUBCUTANEOUS at 04:16

## 2021-12-09 RX ADMIN — HYDROMORPHONE HYDROCHLORIDE 0.5 MG: 1 INJECTION, SOLUTION INTRAMUSCULAR; INTRAVENOUS; SUBCUTANEOUS at 18:23

## 2021-12-09 RX ADMIN — Medication 10 ML: at 08:48

## 2021-12-09 RX ADMIN — METOCLOPRAMIDE 10 MG: 5 INJECTION, SOLUTION INTRAMUSCULAR; INTRAVENOUS at 19:47

## 2021-12-09 RX ADMIN — HYDROMORPHONE HYDROCHLORIDE 1 MG: 1 INJECTION, SOLUTION INTRAMUSCULAR; INTRAVENOUS; SUBCUTANEOUS at 12:02

## 2021-12-09 RX ADMIN — HYDROMORPHONE HYDROCHLORIDE 0.5 MG: 1 INJECTION, SOLUTION INTRAMUSCULAR; INTRAVENOUS; SUBCUTANEOUS at 22:13

## 2021-12-09 RX ADMIN — HYDROMORPHONE HYDROCHLORIDE 0.5 MG: 1 INJECTION, SOLUTION INTRAMUSCULAR; INTRAVENOUS; SUBCUTANEOUS at 15:30

## 2021-12-09 RX ADMIN — HYDROMORPHONE HYDROCHLORIDE 1 MG: 1 INJECTION, SOLUTION INTRAMUSCULAR; INTRAVENOUS; SUBCUTANEOUS at 08:50

## 2021-12-09 RX ADMIN — PIPERACILLIN AND TAZOBACTAM 3375 MG: 3; .375 INJECTION, POWDER, LYOPHILIZED, FOR SOLUTION INTRAVENOUS at 10:53

## 2021-12-09 RX ADMIN — DEXTROSE MONOHYDRATE 12.5 G: 25 INJECTION, SOLUTION INTRAVENOUS at 08:46

## 2021-12-09 RX ADMIN — METOCLOPRAMIDE 10 MG: 5 INJECTION, SOLUTION INTRAMUSCULAR; INTRAVENOUS at 14:17

## 2021-12-09 RX ADMIN — MORPHINE SULFATE 2 MG: 2 INJECTION, SOLUTION INTRAMUSCULAR; INTRAVENOUS at 14:17

## 2021-12-09 RX ADMIN — HYDROMORPHONE HYDROCHLORIDE 1 MG: 1 INJECTION, SOLUTION INTRAMUSCULAR; INTRAVENOUS; SUBCUTANEOUS at 01:21

## 2021-12-09 ASSESSMENT — PAIN SCALES - GENERAL
PAINLEVEL_OUTOF10: 7
PAINLEVEL_OUTOF10: 8
PAINLEVEL_OUTOF10: 6
PAINLEVEL_OUTOF10: 7
PAINLEVEL_OUTOF10: 7
PAINLEVEL_OUTOF10: 6
PAINLEVEL_OUTOF10: 7
PAINLEVEL_OUTOF10: 4
PAINLEVEL_OUTOF10: 7
PAINLEVEL_OUTOF10: 5

## 2021-12-09 ASSESSMENT — PAIN DESCRIPTION - PAIN TYPE
TYPE: ACUTE PAIN

## 2021-12-09 ASSESSMENT — PAIN DESCRIPTION - FREQUENCY
FREQUENCY: CONTINUOUS
FREQUENCY: CONTINUOUS

## 2021-12-09 ASSESSMENT — PAIN DESCRIPTION - LOCATION
LOCATION: ABDOMEN

## 2021-12-09 ASSESSMENT — PAIN DESCRIPTION - PROGRESSION
CLINICAL_PROGRESSION: GRADUALLY WORSENING
CLINICAL_PROGRESSION: GRADUALLY WORSENING
CLINICAL_PROGRESSION: GRADUALLY IMPROVING

## 2021-12-09 ASSESSMENT — PAIN DESCRIPTION - ONSET
ONSET: ON-GOING
ONSET: ON-GOING

## 2021-12-09 ASSESSMENT — PAIN DESCRIPTION - DESCRIPTORS
DESCRIPTORS: ACHING;DULL
DESCRIPTORS: ACHING;DULL

## 2021-12-09 ASSESSMENT — PAIN DESCRIPTION - ORIENTATION
ORIENTATION: MID
ORIENTATION: MID

## 2021-12-09 NOTE — PROGRESS NOTES
Glucose  Patient alert and oriented. Lab called with critical glucose on results : glucose at 49. Patient asymptomatic. placed call and message to Dr Magdi Ackerman . Bedside glucose checked results 44, physician aware. New orders on chart . d5 1/2  Started per order. remains asyptomatic .blood glucose at 51 .

## 2021-12-09 NOTE — PLAN OF CARE
Problem: Nausea/Vomiting:  Goal: Absence of nausea/vomiting  Description: Absence of nausea/vomiting  Outcome: Ongoing  Goal: Able to drink  Description: Able to drink  Outcome: Ongoing  Goal: Able to eat  Description: Able to eat  Outcome: Ongoing  Goal: Ability to achieve adequate nutritional intake will improve  Description: Ability to achieve adequate nutritional intake will improve  Outcome: Ongoing     Problem: Pain:  Goal: Pain level will decrease  Description: Pain level will decrease  Outcome: Ongoing  Goal: Control of acute pain  Description: Control of acute pain  Outcome: Ongoing  Goal: Control of chronic pain  Description: Control of chronic pain  Outcome: Ongoing     Problem: Falls - Risk of:  Goal: Will remain free from falls  Description: Will remain free from falls  Outcome: Ongoing  Goal: Absence of physical injury  Description: Absence of physical injury  Outcome: Ongoing

## 2021-12-09 NOTE — PROGRESS NOTES
[address] [facility]        Progress Note    Patient Name: Toya Coley  MRN: [id number]  YOB: 1960  Date of Evaluation: 2021    Subjective:  No acute events overnight  Pain controlled  No nausea or vomiting, NGT in place - higher output, but clearer  Passing flatus, no stool, denies feeling bloated   Resting in bed at this time    Post-Operative Day # 3      Vital Signs:  Patient Vitals for the past 24 hrs:   BP Temp Temp src Pulse Resp SpO2   21 0415 106/72 97.5 °F (36.4 °C) Axillary 92 16 100 %   21 0115 108/74 98 °F (36.7 °C) Axillary 100 16 97 %   21 2000 110/76 98 °F (36.7 °C) Oral 100 16 94 %   21 1223 115/69 97.6 °F (36.4 °C) Axillary 85 16 93 %      TEMPERATURE HISTORY 24H: Temp (24hrs), Av.8 °F (36.6 °C), Min:97.5 °F (36.4 °C), Max:98 °F (36.7 °C)    BLOOD PRESSURE HISTORY: Systolic (28VQR), LKJ:877 , Min:103 , BSY:643    Diastolic (19DRO), AID:65, Min:69, Max:80      Intake/Output:  I/O last 3 completed shifts:  In: -   Out: 2200 [Urine:400; Emesis/NG output:1800]  I/O this shift: In: 0   Out: 48 [Emesis/NG output:50]  Drain/tube Output:       Physical Exam:  General: awake, alert, oriented to person, place, time  Lungs: unlabored respirations  Abdomen: soft, non-distended, denies incisional tendernessbowel sounds present   Skin/Wound: JORGE dressing in place--sealed well, small amount of old bloody drainage; abdominal binder in place    Labs:  CBC:    Recent Labs     217 21  0517 21  0638   WBC 6.2 8.9 6.3   HGB 10.5* 10.0* 9.0*   HCT 32.1* 29.6* 26.9*   * 200 170     BMP:    Recent Labs     21  0457 21  0517 21  0637   * 138 140   K 4.0 3.7 3.5    105 106   CO2  --  24 25   BUN 8 9 12   CREATININE 0.7* 0.7* 0.6*   GLUCOSE 81 81 49*     Hepatic:    No results for input(s): AST, ALT, ALB, BILITOT, ALKPHOS in the last 72 hours.   Amylase:  No results found for: AMYLASE  Lipase:    Lab Results   Component Value Date    LIPASE 13.0 12/05/2021    LIPASE 10.0 11/29/2021      Mag:    Lab Results   Component Value Date    MG 2.20 12/02/2021    MG 1.70 12/02/2021     Phos:     Lab Results   Component Value Date    PHOS 2.7 12/01/2021    PHOS 3.4 10/14/2021      Coags:   Lab Results   Component Value Date    PROTIME 15.8 12/05/2021    INR 1.38 12/05/2021    APTT 40.9 12/01/2021       Cultures:  Anaerobic culture  No results found for: LABANAE  Fungus stain  No results found for requested labs within last 30 days. Gram stain  No results found for requested labs within last 30 days. Organism  No results found for: [facility]  Surgical culture  No results found for: CXSURG  Blood culture 1  Results in Past 30 Days  Result Component Current Result Ref Range Previous Result Ref Range   Blood Culture, Routine No Growth to date. Any change in status will be called. (12/5/2021)  Not in Time Range      Blood culture 2  Results in Past 30 Days  Result Component Current Result Ref Range Previous Result Ref Range   Culture, Blood 2 No Growth to date. Any change in status will be called. (12/5/2021)  Not in Time Range      Fecal occult  No results found for requested labs within last 30 days. GI bacterial pathogens by PCR  Results in Past 30 Days  Result Component Current Result Ref Range Previous Result Ref Range   GI Bacterial Pathogens By PCR No Shigella spp/EIEC DNA detected  No Shiga toxin-producing gene(s) detected  No Campylobacter spp. (jejuni and coli)DNA detected  No Salmonella spp. DNA detected  Normal Range:  None detected   (11/30/2021)  Not in Time Range      C. difficile  No results found for requested labs within last 30 days.      Urine culture  Lab Results   Component Value Date    LABURIN No growth at 18-36 hours 04/12/2018       Pathology:  OR 12/6/2021--FINAL DIAGNOSIS:     Ileocolonic segmental resection and omental resection:   - Invasive colonic adenocarcinoma, moderately differentiated. - Margins not involved. - One pericolonic lymph nodes positive for metastatic carcinoma (1/13). - Omentum involved by adenocarcinoma. Procedure: Segmental resection of ileocolonic anastomosis   Tumor Site: ileocolonic anastomosis site   Tumor Size: Greatest dimension (centimeter): 3.7        Additional dimensions (centimeters): 2.2 x 0.5   Macroscopic tumor perforation: Not identified   Histologic Type: Adenocarcinoma   Histologic Grade: G2 moderate differentiated   Tumor Extension: Tumor invades through muscularis propria into subserosal   adipose tissue. MARGINS   All margins are uninvolved by invasive carcinoma, high grade dysplasia /   intramucosal carcinoma, and low grade dysplasia      Margins examined: Proximal, distal, and radial    + Distance of invasive carcinoma from closest margin (millimeters or   centimeters): 30 mm    + Specify closest margin: Radial     Treatment Effect (applicable to carcinomas treated with neoadjuvant   therapy): No known pre surgical treatment   Lymph-Vascular Invasion: Not identified   Perineural Invasion: Not identified   + Tumor Budding (Note I)         Low score (0-4)   Type of Polyp in Which Invasive Carcinoma Arose: Not applicable   Tumor deposits: Present     Regional lymph nodes     Number of Lymph nodes Involved: 1     Number of Lymph Nodes Examined: 13     Pathologic Stage Classification (pTNM, AJCC 8th Edition)     TNM Descriptors (select all that apply)         r (recurrent)     Primary Tumor (pT):      pT3: Tumor invades through muscularis propria into pericolorectal   tissue     Regional Lymph Nodes (pN):      pN1a: One regional lymph node is positive     pM Category      pM1     + Additional pathologic findings: Not applicable. + Ancillary studies: MSI pending     Imaging:  I have personally reviewed the following films:    No results found.     Scheduled Meds:   piperacillin-tazobactam  3,375 mg IntraVENous Q8H    sodium chloride flush  5-40 mL IntraVENous 2 times per day    enoxaparin  40 mg SubCUTAneous Daily     Continuous Infusions:   dextrose 5% and 0.45% NaCl with KCl 20 mEq 100 mL/hr at 12/09/21 0848    dextrose      sodium chloride       PRN Meds:.glucose, dextrose, glucagon (rDNA), dextrose, morphine, HYDROmorphone, LORazepam, sodium chloride flush, sodium chloride, ondansetron **OR** ondansetron, polyethylene glycol, acetaminophen **OR** acetaminophen      Assessment:  57 y.o. male admitted with   1. SBO (small bowel obstruction) ([facility] [address]    2. Pneumonia due to infectious organism, unspecified laterality, unspecified part of lung    3. Severe sepsis (HCC)        Status-post exploratory laparotomy, ileocolonic resection of distal ileum and prior hepatic flexure region anastomosis, omental resection on 12/6/2021 for small bowel obstruction, history of colon cancer, intra-abdominal ascites, carcinomatosis and small bowel obstruction from recurrent cancer  Pneumonia  Colon cancer with liver and lung metastasis  Hypertension       Plan:  1. Keep JORGE dressing in place  2. Continue NPO with NGT - trial clamping today  3. IV hydration; monitor and correct electrolytes, IV changed to MIVF, avoid hyypoglycemia  4. Antibiotics--on Zosyn  5. Activity as tolerated, ambulate TID  6. Pulmonary toilet, incentive spirometry  7. PRN analgesics and antiemetics--minimizing narcotics as tolerated  8. DVT prophylaxis with Lovenox and SCD's  9. Management of medical comorbid etiologies per primary team and consulting services    EDUCATION:  Educated patient on plan of care and disease process--all questions answered. Plans discussed with patient and nursing.         Signed:        Odalys Araujo MD

## 2021-12-09 NOTE — PROGRESS NOTES
Awake, sitting up in bed, watching TV, pleasant. C/O level 7/10 pain to abdominal surgery site, denies nausea. NG in place, tolerating well. Gave Dilaudid per prn order. VSS. Assessment completed, see flow charts. No distress noted. tiffani

## 2021-12-09 NOTE — PROGRESS NOTES
175 lb 3.2 oz (79.5 kg)       General appearance:  Appears comfortable in the bed. He is anxious this am   Eyes: Sclera clear. Pupils equal.  ENT: Moist oral mucosa. Trachea midline, no adenopathy. Cardiovascular: Regular rhythm, normal S1, S2. No murmur. No edema in lower extremities  Respiratory: Not using accessory muscles. Good inspiratory effort. Clear to auscultation bilaterally, no wheeze or crackles. GI: Abdomen soft with bowel sounds present. NG currently clamped with pale green output. Operative dressing is intact with old drainage noted. Musculoskeletal: No cyanosis in digits, neck supple  Neurology: CN 2-12 grossly intact. No speech or motor deficits  Psych: Normal affect. Alert and oriented in time, place and person  Skin: Warm, dry, normal turgor    Labs and Tests:  CBC:   Recent Labs     12/07/21  0457 12/08/21  0517 12/09/21  0638   WBC 6.2 8.9 6.3   HGB 10.5* 10.0* 9.0*   * 200 170     BMP:    Recent Labs     12/06/21  1022 12/06/21  1022 12/07/21  0457 12/08/21  0517 12/09/21  0637   *   < > 135* 138 140   K 3.7  --  4.0 3.7 3.5   CL 99   < > 105 105 106   CO2 27  --   --  24 25   BUN 8   < > 8 9 12   CREATININE 0.8   < > 0.7* 0.7* 0.6*   GLUCOSE 80   < > 81 81 49*    < > = values in this interval not displayed. Hepatic:   No results for input(s): AST, ALT, ALB, BILITOT, ALKPHOS in the last 72 hours.     CT abd/pelvis  1. Worsening small bowel obstruction with transition point at the ileocolic   anastomosis.  There is focal 2.2 cm area of wall thickening which may reflect   underlying stricture or mass.  No free air or pneumatosis. 2. Otherwise improved small bowel wall thickening. 3. Moderate to large volume ascites.  There is overall anasarca with   subcutaneous edema and pleural effusions as well.    4. New ground-glass attenuation and slightly increasing consolidative changes   within the lung bases suspicious for superimposed pneumonia.  Right lower   lobe mass is

## 2021-12-10 LAB
ANION GAP SERPL CALCULATED.3IONS-SCNC: 6 MMOL/L (ref 3–16)
BUN BLDV-MCNC: 11 MG/DL (ref 7–20)
CALCIUM SERPL-MCNC: 7.5 MG/DL (ref 8.3–10.6)
CHLORIDE BLD-SCNC: 105 MMOL/L (ref 99–110)
CO2: 26 MMOL/L (ref 21–32)
CREAT SERPL-MCNC: 0.6 MG/DL (ref 0.8–1.3)
GFR AFRICAN AMERICAN: >60
GFR NON-AFRICAN AMERICAN: >60
GLUCOSE BLD-MCNC: 107 MG/DL (ref 70–99)
GLUCOSE BLD-MCNC: 82 MG/DL (ref 70–99)
HCT VFR BLD CALC: 23.8 % (ref 40.5–52.5)
HEMOGLOBIN: 7.8 G/DL (ref 13.5–17.5)
MCH RBC QN AUTO: 33.2 PG (ref 26–34)
MCHC RBC AUTO-ENTMCNC: 33 G/DL (ref 31–36)
MCV RBC AUTO: 100.8 FL (ref 80–100)
PDW BLD-RTO: 17.1 % (ref 12.4–15.4)
PERFORMED ON: NORMAL
PLATELET # BLD: 112 K/UL (ref 135–450)
PMV BLD AUTO: 7.1 FL (ref 5–10.5)
POTASSIUM SERPL-SCNC: 3.2 MMOL/L (ref 3.5–5.1)
RBC # BLD: 2.36 M/UL (ref 4.2–5.9)
SODIUM BLD-SCNC: 137 MMOL/L (ref 136–145)
WBC # BLD: 2.7 K/UL (ref 4–11)

## 2021-12-10 PROCEDURE — 6360000002 HC RX W HCPCS: Performed by: FAMILY MEDICINE

## 2021-12-10 PROCEDURE — 2580000003 HC RX 258: Performed by: FAMILY MEDICINE

## 2021-12-10 PROCEDURE — 85027 COMPLETE CBC AUTOMATED: CPT

## 2021-12-10 PROCEDURE — 2500000003 HC RX 250 WO HCPCS: Performed by: NURSE PRACTITIONER

## 2021-12-10 PROCEDURE — 99024 POSTOP FOLLOW-UP VISIT: CPT | Performed by: SURGERY

## 2021-12-10 PROCEDURE — APPSS15 APP SPLIT SHARED TIME 0-15 MINUTES: Performed by: NURSE PRACTITIONER

## 2021-12-10 PROCEDURE — APPNB30 APP NON BILLABLE TIME 0-30 MINS: Performed by: NURSE PRACTITIONER

## 2021-12-10 PROCEDURE — 94760 N-INVAS EAR/PLS OXIMETRY 1: CPT

## 2021-12-10 PROCEDURE — 80048 BASIC METABOLIC PNL TOTAL CA: CPT

## 2021-12-10 PROCEDURE — 6360000002 HC RX W HCPCS: Performed by: NURSE PRACTITIONER

## 2021-12-10 PROCEDURE — 1200000000 HC SEMI PRIVATE

## 2021-12-10 PROCEDURE — 6360000002 HC RX W HCPCS: Performed by: SURGERY

## 2021-12-10 RX ORDER — SODIUM CHLORIDE 9 MG/ML
INJECTION, SOLUTION INTRAVENOUS
Status: DISPENSED
Start: 2021-12-10 | End: 2021-12-10

## 2021-12-10 RX ORDER — POTASSIUM CHLORIDE 7.45 MG/ML
10 INJECTION INTRAVENOUS
Status: COMPLETED | OUTPATIENT
Start: 2021-12-10 | End: 2021-12-10

## 2021-12-10 RX ORDER — POTASSIUM CHLORIDE 7.45 MG/ML
10 INJECTION INTRAVENOUS PRN
Status: DISCONTINUED | OUTPATIENT
Start: 2021-12-10 | End: 2021-12-17 | Stop reason: HOSPADM

## 2021-12-10 RX ORDER — POTASSIUM CHLORIDE 20 MEQ/1
40 TABLET, EXTENDED RELEASE ORAL PRN
Status: DISCONTINUED | OUTPATIENT
Start: 2021-12-10 | End: 2021-12-17 | Stop reason: HOSPADM

## 2021-12-10 RX ADMIN — HYDROMORPHONE HYDROCHLORIDE 0.5 MG: 1 INJECTION, SOLUTION INTRAMUSCULAR; INTRAVENOUS; SUBCUTANEOUS at 23:23

## 2021-12-10 RX ADMIN — Medication 10 MEQ: at 09:36

## 2021-12-10 RX ADMIN — ENOXAPARIN SODIUM 40 MG: 100 INJECTION SUBCUTANEOUS at 09:26

## 2021-12-10 RX ADMIN — Medication 10 ML: at 09:29

## 2021-12-10 RX ADMIN — HYDROMORPHONE HYDROCHLORIDE 0.5 MG: 1 INJECTION, SOLUTION INTRAMUSCULAR; INTRAVENOUS; SUBCUTANEOUS at 20:04

## 2021-12-10 RX ADMIN — METOCLOPRAMIDE 10 MG: 5 INJECTION, SOLUTION INTRAMUSCULAR; INTRAVENOUS at 01:02

## 2021-12-10 RX ADMIN — HYDROMORPHONE HYDROCHLORIDE 0.5 MG: 1 INJECTION, SOLUTION INTRAMUSCULAR; INTRAVENOUS; SUBCUTANEOUS at 09:35

## 2021-12-10 RX ADMIN — KETOROLAC TROMETHAMINE 15 MG: 15 INJECTION, SOLUTION INTRAMUSCULAR; INTRAVENOUS at 01:02

## 2021-12-10 RX ADMIN — PIPERACILLIN AND TAZOBACTAM 3375 MG: 3; .375 INJECTION, POWDER, LYOPHILIZED, FOR SOLUTION INTRAVENOUS at 09:27

## 2021-12-10 RX ADMIN — POTASSIUM CHLORIDE, DEXTROSE MONOHYDRATE AND SODIUM CHLORIDE: 150; 5; 450 INJECTION, SOLUTION INTRAVENOUS at 03:04

## 2021-12-10 RX ADMIN — HYDROMORPHONE HYDROCHLORIDE 0.5 MG: 1 INJECTION, SOLUTION INTRAMUSCULAR; INTRAVENOUS; SUBCUTANEOUS at 16:55

## 2021-12-10 RX ADMIN — SODIUM CHLORIDE 25 ML: 9 INJECTION, SOLUTION INTRAVENOUS at 01:22

## 2021-12-10 RX ADMIN — HYDROMORPHONE HYDROCHLORIDE 0.5 MG: 1 INJECTION, SOLUTION INTRAMUSCULAR; INTRAVENOUS; SUBCUTANEOUS at 13:43

## 2021-12-10 RX ADMIN — PIPERACILLIN AND TAZOBACTAM 3375 MG: 3; .375 INJECTION, POWDER, LYOPHILIZED, FOR SOLUTION INTRAVENOUS at 01:20

## 2021-12-10 RX ADMIN — PIPERACILLIN AND TAZOBACTAM 3375 MG: 3; .375 INJECTION, POWDER, LYOPHILIZED, FOR SOLUTION INTRAVENOUS at 17:44

## 2021-12-10 RX ADMIN — METOCLOPRAMIDE 10 MG: 5 INJECTION, SOLUTION INTRAMUSCULAR; INTRAVENOUS at 05:44

## 2021-12-10 RX ADMIN — Medication 10 MEQ: at 10:46

## 2021-12-10 ASSESSMENT — PAIN DESCRIPTION - PROGRESSION
CLINICAL_PROGRESSION: GRADUALLY IMPROVING
CLINICAL_PROGRESSION: GRADUALLY WORSENING
CLINICAL_PROGRESSION: GRADUALLY IMPROVING

## 2021-12-10 ASSESSMENT — PAIN SCALES - GENERAL
PAINLEVEL_OUTOF10: 8
PAINLEVEL_OUTOF10: 5
PAINLEVEL_OUTOF10: 8
PAINLEVEL_OUTOF10: 3
PAINLEVEL_OUTOF10: 7
PAINLEVEL_OUTOF10: 4
PAINLEVEL_OUTOF10: 9
PAINLEVEL_OUTOF10: 5
PAINLEVEL_OUTOF10: 6
PAINLEVEL_OUTOF10: 6
PAINLEVEL_OUTOF10: 8

## 2021-12-10 ASSESSMENT — PAIN DESCRIPTION - PAIN TYPE
TYPE: SURGICAL PAIN

## 2021-12-10 ASSESSMENT — PAIN DESCRIPTION - ORIENTATION
ORIENTATION: MID

## 2021-12-10 ASSESSMENT — PAIN DESCRIPTION - LOCATION
LOCATION: ABDOMEN

## 2021-12-10 ASSESSMENT — PAIN DESCRIPTION - FREQUENCY
FREQUENCY: CONTINUOUS

## 2021-12-10 ASSESSMENT — PAIN - FUNCTIONAL ASSESSMENT: PAIN_FUNCTIONAL_ASSESSMENT: ACTIVITIES ARE NOT PREVENTED

## 2021-12-10 ASSESSMENT — PAIN DESCRIPTION - ONSET
ONSET: ON-GOING

## 2021-12-10 ASSESSMENT — PAIN DESCRIPTION - DESCRIPTORS
DESCRIPTORS: DULL;ACHING
DESCRIPTORS: ACHING;DULL

## 2021-12-10 NOTE — PROGRESS NOTES
[facility]    12/10/2021 8:40 AM        Name: Dori Roy . Admitted: 12/5/2021  Primary Care Provider: Kirby Lin MD (Tel: [phone])      Subjective:  . POD # 4 from  Exploratory laparotomy, ileocolonic resection of distal  ileum and prior hepatic flexure region anastomosis, omental resection.     Seen this am   Seems to be very depressed today  Asking for NG removal  BS are sluggish . NG has been clamped for 24 hours.   Had small BM this am         Reviewed interval ancillary notes    Current Medications  sodium chloride 0.9 % infusion,   dextrose 5 % and 0.45 % NaCl with KCl 20 mEq infusion, Continuous  glucose (GLUTOSE) 40 % oral gel 15 g, PRN  dextrose 50 % IV solution, PRN  glucagon (rDNA) injection 1 mg, PRN  dextrose 5 % solution, PRN  HYDROmorphone HCl PF (DILAUDID) injection 0.5 mg, Q3H PRN  morphine (PF) injection 2 mg, Q3H PRN  ketorolac (TORADOL) injection 15 mg, Q6H PRN  piperacillin-tazobactam (ZOSYN) 3,375 mg in dextrose 5 % 50 mL IVPB extended infusion (mini-bag), Q8H  LORazepam (ATIVAN) injection 0.5 mg, Q6H PRN  sodium chloride flush 0.9 % injection 5-40 mL, 2 times per day  sodium chloride flush 0.9 % injection 5-40 mL, PRN  0.9 % sodium chloride infusion, PRN  enoxaparin (LOVENOX) injection 40 mg, Daily  ondansetron (ZOFRAN-ODT) disintegrating tablet 4 mg, Q8H PRN   Or  ondansetron (ZOFRAN) injection 4 mg, Q6H PRN  polyethylene glycol (GLYCOLAX) packet 17 g, Daily PRN  acetaminophen (TYLENOL) tablet 650 mg, Q6H PRN   Or  acetaminophen (TYLENOL) suppository 650 mg, Q6H PRN        Objective:  /77   Pulse 85   Temp 97.4 °F (36.3 °C) (Oral)   Resp 16   Ht 5' 11\" (1.803 m)   Wt 179 lb 3.2 oz (81.3 kg)   SpO2 96%   BMI 24.99 kg/m²     Intake/Output Summary (Last 24 hours) at 12/10/2021 0840  Last data filed at 12/10/2021 0137  Gross per 24 hour   Intake  Output 250 ml   Net -250 ml      Wt Readings from Last 3 Encounters:   12/05/21 179 lb 3.2 oz (81.3 kg)   12/01/21 169 lb 6.4 oz (76.8 kg)   10/13/21 175 lb 3.2 oz (79.5 kg)       General appearance:  Appears comfortable in the bed. He seems depressed today   Eyes: Sclera clear. Pupils equal.  ENT: Moist oral mucosa. Trachea midline, no adenopathy. Cardiovascular: Regular rhythm, normal S1, S2. No murmur. No edema in lower extremities  Respiratory: Not using accessory muscles. Good inspiratory effort. Clear to auscultation bilaterally, no wheeze or crackles. GI: Abdomen soft with sluggish bowel sounds present. NG currently clamped with pale green output. Operative dressing is intact with old drainage noted. Musculoskeletal: No cyanosis in digits, neck supple  Neurology: CN 2-12 grossly intact. No speech or motor deficits  Psych: Normal affect. Alert and oriented in time, place and person  Skin: Warm, dry, normal turgor    Labs and Tests:  CBC:   Recent Labs     12/08/21  0517 12/09/21  0638 12/10/21  0540   WBC 8.9 6.3 2.7*   HGB 10.0* 9.0* 7.8*    170 112*     BMP:    Recent Labs     12/08/21  0517 12/09/21  0637 12/10/21  0540    140 137   K 3.7 3.5 3.2*    106 105   CO2 24 25 26   BUN 9 12 11   CREATININE 0.7* 0.6* 0.6*   GLUCOSE 81 49* 107*     Hepatic:   No results for input(s): AST, ALT, ALB, BILITOT, ALKPHOS in the last 72 hours.     CT abd/pelvis  1. Worsening small bowel obstruction with transition point at the ileocolic   anastomosis.  There is focal 2.2 cm area of wall thickening which may reflect   underlying stricture or mass.  No free air or pneumatosis. 2. Otherwise improved small bowel wall thickening. 3. Moderate to large volume ascites.  There is overall anasarca with   subcutaneous edema and pleural effusions as well.    4. New ground-glass attenuation and slightly increasing consolidative changes   within the lung bases suspicious for superimposed pneumonia.  Right lower   lobe mass is otherwise partially obscured due to atelectasis. 5. Hepatic steatosis.  No significant interval change in hepatic lesions. 6. Splenomegaly. Pennelope Free is a subtle area of low attenuation within the   lateral spleen, nonspecific and could be related to timing of the contrast   bolus; however, findings could also be related to developing splenic infarct. Problem List  Active Problems:    Bowel obstruction (HCC)    Carcinomatosis ([address]    Omental mass  Resolved Problems:    * No resolved hospital problems. *       Assessment & Plan:   1. Bowel Resection:  POD # 4 from Exploratory Lap with ileocolonic resection of distal ileum with prior hepatic flexure anastomosis with omental resection. Continue with NG which is currently clamped. He is moving very well. He is on zosyn   ( I do not think he has any PNA) can likely stop antibiotics if ok with surgery   2. Uncontrolled pain:  Improved with IV dilaudid , dose has been decreased   3. Colon Cancer with carcinomatosis and intra abdominal ascites, liver and lung mets,  He is followed by Dr Saadia Hugo. Anticipates additional therapy when he heals from current surgery or possible clinical trial at    4. Anemia:  Drop in Hgb noted. No obvious signs of bleeding , will follow .   May be dilutional       Diet: Diet NPO Exceptions are: Ice Chips  Code:Full Code  DVT PPX      VIJAY Anderson CNP   12/10/2021 8:40 AM

## 2021-12-10 NOTE — PROGRESS NOTES
Awake, resting quietly in bed, watching TV. NG in place, clamped, denies having any nausea/vomiting. Residual checked, about 5cc gastric juice returned. Taking ice chips, tolerating well. C/O pain to incision site. VSS. Assessment completed, see flow charts.   No distress noted

## 2021-12-10 NOTE — PROGRESS NOTES
[facility]        Progress Note    Patient Name: Dedrick Favre  MRN: [id number]  YOB: 1960  Date of Evaluation: 12/10/2021    Subjective:  No acute events overnight  Pain controlled  No nausea or vomiting, with NGT clamping  Passing flatus and now stool, denies feeling bloated   Resting in bed at this time    Post-Operative Day #4      Vital Signs:  Patient Vitals for the past 24 hrs:   BP Temp Temp src Pulse Resp SpO2   12/10/21 1204 118/81 98.2 °F (36.8 °C) Oral 90 16 98 %   12/10/21 0759 109/77 97.4 °F (36.3 °C) Oral 85 16 96 %   12/10/21 0530 105/69   80     12/10/21 0353 (!) 92/59 97.7 °F (36.5 °C) Oral 78 16 98 %   12/10/21 0055 126/85 97.5 °F (36.4 °C) Oral 92 16 94 %   21 1945 110/76 97.4 °F (36.3 °C) Oral 100 16 99 %      TEMPERATURE HISTORY 24H: Temp (24hrs), Av.6 °F (36.4 °C), Min:97.4 °F (36.3 °C), Max:98.2 °F (36.8 °C)    BLOOD PRESSURE HISTORY: Systolic (01FJK), GHH:936 , Min:92 , GQJ:618    Diastolic (25ORH), ZSP:27, Min:59, Max:85      Intake/Output:  I/O last 3 completed shifts:  In: -   Out: 200 [Urine:200]  No intake/output data recorded. Drain/tube Output:       Physical Exam:  General: awake, alert, oriented to person, place, time  Lungs: unlabored respirations  Abdomen: soft, non-distended, incisional tenderness only, bowel sounds present   Skin/Wound: healing well, no drainage, no erythema, well approximated with laura--JORGE dressing changed    Labs:  CBC:    Recent Labs     21  0517 21  0638 12/10/21  0540   WBC 8.9 6.3 2.7*   HGB 10.0* 9.0* 7.8*   HCT 29.6* 26.9* 23.8*    170 112*     BMP:    Recent Labs     21  0517 21  0637 12/10/21  0540    140 137   K 3.7 3.5 3.2*    106 105   CO2 24 25 26   BUN 9 12 11   CREATININE 0.7* 0.6* 0.6*   GLUCOSE 81 49* 107*     Hepatic:    No results for input(s): AST, ALT, ALB, BILITOT, ALKPHOS in the last 72 hours.   Amylase:  No results found for: AMYLASE  Lipase:    Lab Results   Component Value Date    LIPASE 13.0 12/05/2021    LIPASE 10.0 11/29/2021      Mag:    Lab Results   Component Value Date    MG 2.20 12/02/2021    MG 1.70 12/02/2021     Phos:     Lab Results   Component Value Date    PHOS 2.7 12/01/2021    PHOS 3.4 10/14/2021      Coags:   Lab Results   Component Value Date    PROTIME 15.8 12/05/2021    INR 1.38 12/05/2021    APTT 40.9 12/01/2021       Cultures:  Anaerobic culture  No results found for: LABANAE  Fungus stain  No results found for requested labs within last 30 days. Gram stain  No results found for requested labs within last 30 days. Organism  No results found for: [facility]  Surgical culture  No results found for: CXSURG  Blood culture 1  Results in Past 30 Days  Result Component Current Result Ref Range Previous Result Ref Range   Blood Culture, Routine No Growth after 4 days of incubation. (12/5/2021)  Not in Time Range      Blood culture 2  Results in Past 30 Days  Result Component Current Result Ref Range Previous Result Ref Range   Culture, Blood 2 No Growth after 4 days of incubation. (12/5/2021)  Not in Time Range      Fecal occult  No results found for requested labs within last 30 days. GI bacterial pathogens by PCR  Results in Past 30 Days  Result Component Current Result Ref Range Previous Result Ref Range   GI Bacterial Pathogens By PCR No Shigella spp/EIEC DNA detected  No Shiga toxin-producing gene(s) detected  No Campylobacter spp. (jejuni and coli)DNA detected  No Salmonella spp. DNA detected  Normal Range:  None detected   (11/30/2021)  Not in Time Range      C. difficile  No results found for requested labs within last 30 days. Urine culture  Lab Results   Component Value Date    LABURIN No growth at 18-36 hours 04/12/2018       Pathology:  OR 12/6/2021--FINAL DIAGNOSIS:     Ileocolonic segmental resection and omental resection:   - Invasive colonic adenocarcinoma, moderately differentiated. - Margins not involved. - One pericolonic lymph nodes positive for metastatic carcinoma (1/13). - Omentum involved by adenocarcinoma. Procedure: Segmental resection of ileocolonic anastomosis   Tumor Site: ileocolonic anastomosis site   Tumor Size: Greatest dimension (centimeter): 3.7        Additional dimensions (centimeters): 2.2 x 0.5   Macroscopic tumor perforation: Not identified   Histologic Type: Adenocarcinoma   Histologic Grade: G2 moderate differentiated   Tumor Extension: Tumor invades through muscularis propria into subserosal   adipose tissue. MARGINS   All margins are uninvolved by invasive carcinoma, high grade dysplasia /   intramucosal carcinoma, and low grade dysplasia      Margins examined: Proximal, distal, and radial    + Distance of invasive carcinoma from closest margin (millimeters or   centimeters): 30 mm    + Specify closest margin: Radial     Treatment Effect (applicable to carcinomas treated with neoadjuvant   therapy): No known pre surgical treatment   Lymph-Vascular Invasion: Not identified   Perineural Invasion: Not identified   + Tumor Budding (Note I)         Low score (0-4)   Type of Polyp in Which Invasive Carcinoma Arose: Not applicable   Tumor deposits: Present     Regional lymph nodes     Number of Lymph nodes Involved: 1     Number of Lymph Nodes Examined: 13     Pathologic Stage Classification (pTNM, AJCC 8th Edition)     TNM Descriptors (select all that apply)         r (recurrent)     Primary Tumor (pT):      pT3: Tumor invades through muscularis propria into pericolorectal   tissue     Regional Lymph Nodes (pN):      pN1a: One regional lymph node is positive     pM Category      pM1     + Additional pathologic findings: Not applicable. + Ancillary studies: MSI pending     Imaging:  I have personally reviewed the following films:    No results found.     Scheduled Meds:   piperacillin-tazobactam  3,375 mg IntraVENous Q8H    sodium chloride flush  5-40 mL IntraVENous 2 times per day    enoxaparin  40 mg SubCUTAneous Daily     Continuous Infusions:   dextrose 5% and 0.45% NaCl with KCl 20 mEq 100 mL/hr at 12/10/21 0304    dextrose      sodium chloride 25 mL (12/10/21 0122)     PRN Meds:.potassium chloride **OR** potassium alternative oral replacement **OR** potassium chloride, glucose, dextrose, glucagon (rDNA), dextrose, HYDROmorphone, morphine, ketorolac, LORazepam, sodium chloride flush, sodium chloride, ondansetron **OR** ondansetron, polyethylene glycol, acetaminophen **OR** acetaminophen      Assessment:  57 y.o. male admitted with   1. SBO (small bowel obstruction) ([facility] [address]    2. Pneumonia due to infectious organism, unspecified laterality, unspecified part of lung    3. Severe sepsis (HCC)        Status-post exploratory laparotomy, ileocolonic resection of distal ileum and prior hepatic flexure region anastomosis, omental resection on 12/6/2021 for small bowel obstruction, history of colon cancer, intra-abdominal ascites, carcinomatosis and small bowel obstruction from recurrent cancer  Pneumonia  Colon cancer with liver and lung metastasis  Hypertension       Plan:  1. JORGE dressing changed per Surgery, keep in place  2. Remove NGT, clear liquid diet as tolerated; monitor bowel function--passing stool  3. IV hydration until PO intake is adequate; monitor and correct electrolytes  4. Antibiotics--on Zosyn  5. Activity as tolerated, ambulate TID  6. Pulmonary toilet, incentive spirometry  7. PRN analgesics and antiemetics--minimizing narcotics as tolerated  8. DVT prophylaxis with Lovenox and SCD's  9. Management of medical comorbid etiologies per primary team and consulting services  10. Disposition: Anticipate discharge home in the next 2-3 days from a surgical perspective    EDUCATION:  Educated patient on plan of care and disease process--all questions answered. Plans discussed with patient and nursing.   Reviewed and discussed with  Shereen.       Signed:  VIJAY Marshall - CNP  12/10/2021 3:23 PM     Surgery Staff:     Pt seen and examined with NP  See full note above  Doing well today  NG removed, clears  JORGE changed  FU for diet progression in am  Hopeful for DC in a couple days    Yolis Morales MD

## 2021-12-11 LAB
ANION GAP SERPL CALCULATED.3IONS-SCNC: 8 MMOL/L (ref 3–16)
BUN BLDV-MCNC: 6 MG/DL (ref 7–20)
CALCIUM SERPL-MCNC: 7.6 MG/DL (ref 8.3–10.6)
CHLORIDE BLD-SCNC: 101 MMOL/L (ref 99–110)
CO2: 23 MMOL/L (ref 21–32)
CREAT SERPL-MCNC: 0.6 MG/DL (ref 0.8–1.3)
GFR AFRICAN AMERICAN: >60
GFR NON-AFRICAN AMERICAN: >60
GLUCOSE BLD-MCNC: 114 MG/DL (ref 70–99)
HCT VFR BLD CALC: 26.9 % (ref 40.5–52.5)
HEMOGLOBIN: 9 G/DL (ref 13.5–17.5)
MCH RBC QN AUTO: 33.6 PG (ref 26–34)
MCHC RBC AUTO-ENTMCNC: 33.6 G/DL (ref 31–36)
MCV RBC AUTO: 99.9 FL (ref 80–100)
PDW BLD-RTO: 17.4 % (ref 12.4–15.4)
PLATELET # BLD: 186 K/UL (ref 135–450)
PMV BLD AUTO: 7.3 FL (ref 5–10.5)
POTASSIUM SERPL-SCNC: 3.8 MMOL/L (ref 3.5–5.1)
PROCALCITONIN: 0.21 NG/ML (ref 0–0.15)
RBC # BLD: 2.69 M/UL (ref 4.2–5.9)
SODIUM BLD-SCNC: 132 MMOL/L (ref 136–145)
WBC # BLD: 6.6 K/UL (ref 4–11)

## 2021-12-11 PROCEDURE — 2580000003 HC RX 258: Performed by: FAMILY MEDICINE

## 2021-12-11 PROCEDURE — 85027 COMPLETE CBC AUTOMATED: CPT

## 2021-12-11 PROCEDURE — 6360000002 HC RX W HCPCS: Performed by: FAMILY MEDICINE

## 2021-12-11 PROCEDURE — 99024 POSTOP FOLLOW-UP VISIT: CPT | Performed by: SURGERY

## 2021-12-11 PROCEDURE — 1200000000 HC SEMI PRIVATE

## 2021-12-11 PROCEDURE — 36415 COLL VENOUS BLD VENIPUNCTURE: CPT

## 2021-12-11 PROCEDURE — 2500000003 HC RX 250 WO HCPCS: Performed by: NURSE PRACTITIONER

## 2021-12-11 PROCEDURE — 80048 BASIC METABOLIC PNL TOTAL CA: CPT

## 2021-12-11 PROCEDURE — 6370000000 HC RX 637 (ALT 250 FOR IP): Performed by: NURSE PRACTITIONER

## 2021-12-11 PROCEDURE — 84145 PROCALCITONIN (PCT): CPT

## 2021-12-11 PROCEDURE — 6360000002 HC RX W HCPCS: Performed by: SURGERY

## 2021-12-11 RX ORDER — OXYCODONE HYDROCHLORIDE AND ACETAMINOPHEN 5; 325 MG/1; MG/1
1 TABLET ORAL EVERY 6 HOURS PRN
Status: DISCONTINUED | OUTPATIENT
Start: 2021-12-11 | End: 2021-12-15

## 2021-12-11 RX ORDER — CALCIUM CARBONATE 200(500)MG
500 TABLET,CHEWABLE ORAL 3 TIMES DAILY PRN
Status: DISCONTINUED | OUTPATIENT
Start: 2021-12-11 | End: 2021-12-17 | Stop reason: HOSPADM

## 2021-12-11 RX ADMIN — HYDROMORPHONE HYDROCHLORIDE 0.5 MG: 1 INJECTION, SOLUTION INTRAMUSCULAR; INTRAVENOUS; SUBCUTANEOUS at 14:00

## 2021-12-11 RX ADMIN — PIPERACILLIN AND TAZOBACTAM 3375 MG: 3; .375 INJECTION, POWDER, LYOPHILIZED, FOR SOLUTION INTRAVENOUS at 01:26

## 2021-12-11 RX ADMIN — OXYCODONE AND ACETAMINOPHEN 1 TABLET: 5; 325 TABLET ORAL at 17:44

## 2021-12-11 RX ADMIN — HYDROMORPHONE HYDROCHLORIDE 0.5 MG: 1 INJECTION, SOLUTION INTRAMUSCULAR; INTRAVENOUS; SUBCUTANEOUS at 05:38

## 2021-12-11 RX ADMIN — PIPERACILLIN AND TAZOBACTAM 3375 MG: 3; .375 INJECTION, POWDER, LYOPHILIZED, FOR SOLUTION INTRAVENOUS at 09:38

## 2021-12-11 RX ADMIN — SODIUM CHLORIDE 25 ML: 9 INJECTION, SOLUTION INTRAVENOUS at 09:35

## 2021-12-11 RX ADMIN — HYDROMORPHONE HYDROCHLORIDE 0.5 MG: 1 INJECTION, SOLUTION INTRAMUSCULAR; INTRAVENOUS; SUBCUTANEOUS at 02:37

## 2021-12-11 RX ADMIN — HYDROMORPHONE HYDROCHLORIDE 0.5 MG: 1 INJECTION, SOLUTION INTRAMUSCULAR; INTRAVENOUS; SUBCUTANEOUS at 20:21

## 2021-12-11 RX ADMIN — POTASSIUM CHLORIDE, DEXTROSE MONOHYDRATE AND SODIUM CHLORIDE: 150; 5; 450 INJECTION, SOLUTION INTRAVENOUS at 20:13

## 2021-12-11 RX ADMIN — SODIUM CHLORIDE 25 ML: 9 INJECTION, SOLUTION INTRAVENOUS at 17:17

## 2021-12-11 RX ADMIN — ENOXAPARIN SODIUM 40 MG: 100 INJECTION SUBCUTANEOUS at 09:40

## 2021-12-11 RX ADMIN — PIPERACILLIN AND TAZOBACTAM 3375 MG: 3; .375 INJECTION, POWDER, LYOPHILIZED, FOR SOLUTION INTRAVENOUS at 17:20

## 2021-12-11 RX ADMIN — SODIUM CHLORIDE 25 ML: 9 INJECTION, SOLUTION INTRAVENOUS at 01:24

## 2021-12-11 RX ADMIN — HYDROMORPHONE HYDROCHLORIDE 0.5 MG: 1 INJECTION, SOLUTION INTRAMUSCULAR; INTRAVENOUS; SUBCUTANEOUS at 09:44

## 2021-12-11 RX ADMIN — POTASSIUM CHLORIDE, DEXTROSE MONOHYDRATE AND SODIUM CHLORIDE: 150; 5; 450 INJECTION, SOLUTION INTRAVENOUS at 09:28

## 2021-12-11 ASSESSMENT — PAIN DESCRIPTION - FREQUENCY
FREQUENCY: CONTINUOUS

## 2021-12-11 ASSESSMENT — PAIN SCALES - GENERAL
PAINLEVEL_OUTOF10: 6
PAINLEVEL_OUTOF10: 6
PAINLEVEL_OUTOF10: 0
PAINLEVEL_OUTOF10: 7
PAINLEVEL_OUTOF10: 6
PAINLEVEL_OUTOF10: 5
PAINLEVEL_OUTOF10: 6
PAINLEVEL_OUTOF10: 6
PAINLEVEL_OUTOF10: 7
PAINLEVEL_OUTOF10: 6
PAINLEVEL_OUTOF10: 0
PAINLEVEL_OUTOF10: 5
PAINLEVEL_OUTOF10: 4

## 2021-12-11 ASSESSMENT — PAIN DESCRIPTION - LOCATION
LOCATION: ABDOMEN

## 2021-12-11 ASSESSMENT — PAIN DESCRIPTION - PROGRESSION
CLINICAL_PROGRESSION: GRADUALLY WORSENING
CLINICAL_PROGRESSION: GRADUALLY WORSENING
CLINICAL_PROGRESSION: GRADUALLY IMPROVING
CLINICAL_PROGRESSION: GRADUALLY IMPROVING
CLINICAL_PROGRESSION: GRADUALLY WORSENING
CLINICAL_PROGRESSION: NOT CHANGED
CLINICAL_PROGRESSION: GRADUALLY WORSENING

## 2021-12-11 ASSESSMENT — PAIN DESCRIPTION - DESCRIPTORS
DESCRIPTORS: DULL;ACHING
DESCRIPTORS: ACHING
DESCRIPTORS: DULL;ACHING

## 2021-12-11 ASSESSMENT — PAIN DESCRIPTION - ORIENTATION
ORIENTATION: MID

## 2021-12-11 ASSESSMENT — PAIN - FUNCTIONAL ASSESSMENT
PAIN_FUNCTIONAL_ASSESSMENT: ACTIVITIES ARE NOT PREVENTED

## 2021-12-11 ASSESSMENT — PAIN DESCRIPTION - ONSET
ONSET: ON-GOING

## 2021-12-11 ASSESSMENT — PAIN DESCRIPTION - PAIN TYPE
TYPE: SURGICAL PAIN

## 2021-12-11 NOTE — PROGRESS NOTES
[facility]        Progress Note    Patient Name: Dede Del Rosario  MRN: [id number]  YOB: 1960  Date of Evaluation: 2021    Subjective:  No acute events overnight  Pain controlled  Tolerating clear liquids without nausea, isolated nausea from broth taste rather than not tolerating the PO volume  Passing flatus and loose stool    Post-Operative Day #5    Vital Signs:  Patient Vitals for the past 24 hrs:   BP Temp Temp src Pulse Resp SpO2   21 0915 108/77 98.1 °F (36.7 °C) Oral 94 16 100 %   21 0523 102/71 97.9 °F (36.6 °C) Oral 103 16 96 %   21 0235 108/73        21 0100 98/64  Oral 95 15 98 %   12/10/21 2004 105/73 98.6 °F (37 °C) Oral 100 16 99 %   12/10/21 1727      98 %   12/10/21 1652 112/77 98.4 °F (36.9 °C) Oral 99 16 99 %   12/10/21 1204 118/81 98.2 °F (36.8 °C) Oral 90 16 98 %      TEMPERATURE HISTORY 24H: Temp (24hrs), Av.2 °F (36.8 °C), Min:97.9 °F (36.6 °C), Max:98.6 °F (37 °C)    BLOOD PRESSURE HISTORY: Systolic (76GDG), QHH:131 , Min:92 , JTQ:803    Diastolic (31XIQ), QGV:73, Min:59, Max:85      Intake/Output:  I/O last 3 completed shifts: In: 3401 [P.O.:120; I.V.:1200; IV Piggyback:250]  Out: -   I/O this shift: In: 834.8 [P.O.:240;  I.V.:500; IV Piggyback:94.8]  Out: -   Drain/tube Output:       Physical Exam:  General: awake, alert, oriented to person, place, time  Abdomen: soft, non-distended, appropriate incisional tenderness, JORGE dressing in place    Labs:  CBC:    Recent Labs     21  0638 12/10/21  0540 21  0459   WBC 6.3 2.7* 6.6   HGB 9.0* 7.8* 9.0*   HCT 26.9* 23.8* 26.9*    112* 186     BMP:    Recent Labs     21  0637 12/10/21  0540 21  0459    137 132*   K 3.5 3.2* 3.8    105 101   CO2 25 26 23   BUN 12 11 6*   CREATININE 0.6* 0.6* 0.6*   GLUCOSE 49* 107* 114*     Hepatic:    No results for input(s): AST, ALT, ALB, BILITOT, ALKPHOS in the last 72 hours. Amylase:  No results found for: AMYLASE  Lipase:    Lab Results   Component Value Date    LIPASE 13.0 12/05/2021    LIPASE 10.0 11/29/2021      Mag:    Lab Results   Component Value Date    MG 2.20 12/02/2021    MG 1.70 12/02/2021     Phos:     Lab Results   Component Value Date    PHOS 2.7 12/01/2021    PHOS 3.4 10/14/2021      Coags:   Lab Results   Component Value Date    PROTIME 15.8 12/05/2021    INR 1.38 12/05/2021    APTT 40.9 12/01/2021       Cultures:  Anaerobic culture  No results found for: LABANAE  Fungus stain  No results found for requested labs within last 30 days. Gram stain  No results found for requested labs within last 30 days. Organism  No results found for: [facility]  Surgical culture  No results found for: CXSURG  Blood culture 1  Results in Past 30 Days  Result Component Current Result Ref Range Previous Result Ref Range   Blood Culture, Routine No Growth after 4 days of incubation. (12/5/2021)  Not in Time Range      Blood culture 2  Results in Past 30 Days  Result Component Current Result Ref Range Previous Result Ref Range   Culture, Blood 2 No Growth after 4 days of incubation. (12/5/2021)  Not in Time Range      Fecal occult  No results found for requested labs within last 30 days. GI bacterial pathogens by PCR  Results in Past 30 Days  Result Component Current Result Ref Range Previous Result Ref Range   GI Bacterial Pathogens By PCR No Shigella spp/EIEC DNA detected  No Shiga toxin-producing gene(s) detected  No Campylobacter spp. (jejuni and coli)DNA detected  No Salmonella spp. DNA detected  Normal Range:  None detected   (11/30/2021)  Not in Time Range      C. difficile  No results found for requested labs within last 30 days.      Urine culture  Lab Results   Component Value Date    LABURIN No growth at 18-36 hours 04/12/2018       Pathology:  OR 12/6/2021--FINAL DIAGNOSIS:     Ileocolonic segmental resection and omental resection:   - Invasive colonic adenocarcinoma, moderately differentiated. - Margins not involved. - One pericolonic lymph nodes positive for metastatic carcinoma (1/13). - Omentum involved by adenocarcinoma. Procedure: Segmental resection of ileocolonic anastomosis   Tumor Site: ileocolonic anastomosis site   Tumor Size: Greatest dimension (centimeter): 3.7        Additional dimensions (centimeters): 2.2 x 0.5   Macroscopic tumor perforation: Not identified   Histologic Type: Adenocarcinoma   Histologic Grade: G2 moderate differentiated   Tumor Extension: Tumor invades through muscularis propria into subserosal   adipose tissue. MARGINS   All margins are uninvolved by invasive carcinoma, high grade dysplasia /   intramucosal carcinoma, and low grade dysplasia      Margins examined: Proximal, distal, and radial    + Distance of invasive carcinoma from closest margin (millimeters or   centimeters): 30 mm    + Specify closest margin: Radial     Treatment Effect (applicable to carcinomas treated with neoadjuvant   therapy): No known pre surgical treatment   Lymph-Vascular Invasion: Not identified   Perineural Invasion: Not identified   + Tumor Budding (Note I)         Low score (0-4)   Type of Polyp in Which Invasive Carcinoma Arose: Not applicable   Tumor deposits: Present     Regional lymph nodes     Number of Lymph nodes Involved: 1     Number of Lymph Nodes Examined: 13     Pathologic Stage Classification (pTNM, AJCC 8th Edition)     TNM Descriptors (select all that apply)         r (recurrent)     Primary Tumor (pT):      pT3: Tumor invades through muscularis propria into pericolorectal   tissue     Regional Lymph Nodes (pN):      pN1a: One regional lymph node is positive     pM Category      pM1     + Additional pathologic findings: Not applicable. + Ancillary studies: MSI pending     Imaging:  I have personally reviewed the following films:    No results found.     Scheduled Meds:   piperacillin-tazobactam  3,375 mg IntraVENous Q8H    sodium chloride flush  5-40 mL IntraVENous 2 times per day    enoxaparin  40 mg SubCUTAneous Daily     Continuous Infusions:   dextrose 5% and 0.45% NaCl with KCl 20 mEq 100 mL/hr at 12/11/21 8535    dextrose      sodium chloride 25 mL (12/11/21 0935)     PRN Meds:.potassium chloride **OR** potassium alternative oral replacement **OR** potassium chloride, glucose, dextrose, glucagon (rDNA), dextrose, HYDROmorphone, morphine, ketorolac, LORazepam, sodium chloride flush, sodium chloride, ondansetron **OR** ondansetron, polyethylene glycol, acetaminophen **OR** acetaminophen      Assessment:  57 y.o. male admitted with   1. SBO (small bowel obstruction) ([facility] [address]    2. Pneumonia due to infectious organism, unspecified laterality, unspecified part of lung    3. Severe sepsis (HCC)        Status-post exploratory laparotomy, ileocolonic resection of distal ileum and prior hepatic flexure region anastomosis, omental resection on 12/6/2021 for small bowel obstruction, history of colon cancer, intra-abdominal ascites, carcinomatosis and small bowel obstruction from recurrent cancer  Pneumonia  Colon cancer with liver and lung metastasis  Hypertension       Plan:  1. Tolerating clear liquids, advance to full liquids  2. Monitor bowel function, passing loose stool  3. Local wound care with JORGE dressing  4. Activity as tolerated  5. Antibiotics  6. Pain control, switch to PO and minimize narcotics  7. Defer management of medical comorbid etiologies per primary team and consulting services  8. Discharge planning, anticipate soon    oMnica Rendon.  Gloria Wahl MD, FACS  12/11/2021  2:38 PM

## 2021-12-11 NOTE — PROGRESS NOTES
Assessment completed. Patient in bed, alert and oriented x 4 and able to make all his needs known. C/o pain to mid abd. Dressing to mid abd CDI. Medications administered as ordered. Had clear liquid diet, c/o mild nausea with the chicken broth but otherwise tolerated other fluids well. Ambulated to bathroom and back to bed independently, tolerated well. POC and education reviewed with patient and mutually agreed upon. All needs met at this time. Call light in reach. Will continue to monitor. 1500: Patient ambulated in corridor and tolerated well.

## 2021-12-11 NOTE — PROGRESS NOTES
Assessment complete. Alert, oriented x4. Reports abdominal pain improved since administration of PRN Dilaudid. Dressing to abdomen clean, dry, intact. Denies nausea. The care plan and education has been reviewed and mutually agreed upon with the patient. In bed, bed in lowest position, call light and table within reach. No further needs expressed at this time. 0239  Complaining of pain, given PRN Dilaudid. 0540  Complaining of pain, given PRN Dilaudid.

## 2021-12-11 NOTE — PROGRESS NOTES
[facility]    12/11/2021 7:46 AM        Name: Che García . Admitted: 12/5/2021  Primary Care Provider: Candiss Kocher, MD (Tel: [phone])      Subjective:  . POD # 5 from  Exploratory laparotomy, ileocolonic resection of distal  ileum and prior hepatic flexure region anastomosis, omental resection.     Pt with lung and liver mets   NG removed on Friday 12/10/21  + loose stools   Feels better today  Tolerating clears     Day # 6 of zosyn.     Pro jeni .21    Reviewed interval ancillary notes    Current Medications  potassium chloride (KLOR-CON M) extended release tablet 40 mEq, PRN   Or  potassium bicarb-citric acid (EFFER-K) effervescent tablet 40 mEq, PRN   Or  potassium chloride 10 mEq/100 mL IVPB (Peripheral Line), PRN  dextrose 5 % and 0.45 % NaCl with KCl 20 mEq infusion, Continuous  glucose (GLUTOSE) 40 % oral gel 15 g, PRN  dextrose 50 % IV solution, PRN  glucagon (rDNA) injection 1 mg, PRN  dextrose 5 % solution, PRN  HYDROmorphone HCl PF (DILAUDID) injection 0.5 mg, Q3H PRN  morphine (PF) injection 2 mg, Q3H PRN  ketorolac (TORADOL) injection 15 mg, Q6H PRN  piperacillin-tazobactam (ZOSYN) 3,375 mg in dextrose 5 % 50 mL IVPB extended infusion (mini-bag), Q8H  LORazepam (ATIVAN) injection 0.5 mg, Q6H PRN  sodium chloride flush 0.9 % injection 5-40 mL, 2 times per day  sodium chloride flush 0.9 % injection 5-40 mL, PRN  0.9 % sodium chloride infusion, PRN  enoxaparin (LOVENOX) injection 40 mg, Daily  ondansetron (ZOFRAN-ODT) disintegrating tablet 4 mg, Q8H PRN   Or  ondansetron (ZOFRAN) injection 4 mg, Q6H PRN  polyethylene glycol (GLYCOLAX) packet 17 g, Daily PRN  acetaminophen (TYLENOL) tablet 650 mg, Q6H PRN   Or  acetaminophen (TYLENOL) suppository 650 mg, Q6H PRN        Objective:  /71   Pulse 103   Temp 97.9 °F (36.6 °C) (Oral)   Resp 16   Ht 5' 11\" (1.803 m)   Wt 179 lb 3.2 oz (81.3 kg)   SpO2 96%   BMI 24.99 kg/m²     Intake/Output Summary (Last 24 hours) at 12/11/2021 0746  Last data filed at 12/11/2021 0235  Gross per 24 hour   Intake 1570 ml   Output    Net 1570 ml      Wt Readings from Last 3 Encounters:   12/05/21 179 lb 3.2 oz (81.3 kg)   12/01/21 169 lb 6.4 oz (76.8 kg)   10/13/21 175 lb 3.2 oz (79.5 kg)       General appearance:  Appears comfortable in the bed. He remains depressed but cooperative   Eyes: Sclera clear. Pupils equal.  ENT: Moist oral mucosa. Trachea midline, no adenopathy. Cardiovascular: Regular rhythm, normal S1, S2. No murmur. No edema in lower extremities  Respiratory: Not using accessory muscles. Good inspiratory effort. Clear to auscultation bilaterally, no wheeze or crackles. GI: Abdomen soft with + bowel sounds present. Operative dressing CDI  Musculoskeletal: No cyanosis in digits, neck supple  Neurology: CN 2-12 grossly intact. No speech or motor deficits  Psych: Normal affect. Alert and oriented in time, place and person  Skin: Warm, dry, normal turgor    Labs and Tests:  CBC:   Recent Labs     12/09/21  0638 12/10/21  0540 12/11/21  0459   WBC 6.3 2.7* 6.6   HGB 9.0* 7.8* 9.0*    112* 186     BMP:    Recent Labs     12/09/21  0637 12/10/21  0540 12/11/21  0459    137 132*   K 3.5 3.2* 3.8    105 101   CO2 25 26 23   BUN 12 11 6*   CREATININE 0.6* 0.6* 0.6*   GLUCOSE 49* 107* 114*     Hepatic:   No results for input(s): AST, ALT, ALB, BILITOT, ALKPHOS in the last 72 hours.     CT abd/pelvis  1. Worsening small bowel obstruction with transition point at the ileocolic   anastomosis.  There is focal 2.2 cm area of wall thickening which may reflect   underlying stricture or mass.  No free air or pneumatosis. 2. Otherwise improved small bowel wall thickening. 3. Moderate to large volume ascites.  There is overall anasarca with   subcutaneous edema and pleural effusions as well.    4. New ground-glass attenuation and slightly increasing consolidative changes   within the lung bases suspicious for superimposed pneumonia.  Right lower   lobe mass is otherwise partially obscured due to atelectasis. 5. Hepatic steatosis.  No significant interval change in hepatic lesions. 6. Splenomegaly. [place] is a subtle area of low attenuation within the   lateral spleen, nonspecific and could be related to timing of the contrast   bolus; however, findings could also be related to developing splenic infarct. Problem List  Active Problems:    Bowel obstruction (HCC)    Carcinomatosis ([address]    Omental mass  Resolved Problems:    * No resolved hospital problems. *       Assessment & Plan:   1. Bowel Resection:  POD # 5 from Exploratory Lap with ileocolonic resection of distal ileum with prior hepatic flexure anastomosis with omental resection. + BM,  Tolerating clears   He is on zosyn day # 6. IS and ambulation encouraged. 2. Uncontrolled pain:  Improved with IV dilaudid , percocet added    3. Colon Cancer with carcinomatosis and intra abdominal ascites, liver and lung mets,  He is followed by Dr Benny Fleming. Anticipates additional therapy when he heals from current surgery or possible clinical trial at    4. Anemia:  hgb stable at 9       Diet: ADULT DIET;  Clear Liquid  Code:Full Code  DVT PPX      VIJAY Knowles - CNP   12/11/2021 7:46 AM

## 2021-12-11 NOTE — PLAN OF CARE
Problem: Nausea/Vomiting:  Goal: Absence of nausea/vomiting  Description: Absence of nausea/vomiting  12/11/2021 1051 by Art Pat RN  Outcome: Ongoing  12/11/2021 0324 by Susan Porter RN  Outcome: Ongoing  Goal: Able to drink  Description: Able to drink  12/11/2021 1051 by Art Pat RN  Outcome: Ongoing  12/11/2021 0324 by Susan Porter RN  Outcome: Ongoing  Goal: Able to eat  Description: Able to eat  12/11/2021 1051 by Art Pat RN  Outcome: Ongoing  12/11/2021 0324 by Susan Porter RN  Outcome: Ongoing  Goal: Ability to achieve adequate nutritional intake will improve  Description: Ability to achieve adequate nutritional intake will improve  12/11/2021 1051 by Art Pat RN  Outcome: Ongoing  12/11/2021 0324 by Susan Porter RN  Outcome: Ongoing

## 2021-12-12 ENCOUNTER — APPOINTMENT (OUTPATIENT)
Dept: GENERAL RADIOLOGY | Age: 61
DRG: 329 | End: 2021-12-12
Payer: COMMERCIAL

## 2021-12-12 LAB
ANION GAP SERPL CALCULATED.3IONS-SCNC: 8 MMOL/L (ref 3–16)
BUN BLDV-MCNC: 4 MG/DL (ref 7–20)
CALCIUM SERPL-MCNC: 7.9 MG/DL (ref 8.3–10.6)
CHLORIDE BLD-SCNC: 102 MMOL/L (ref 99–110)
CO2: 24 MMOL/L (ref 21–32)
CREAT SERPL-MCNC: 0.6 MG/DL (ref 0.8–1.3)
GFR AFRICAN AMERICAN: >60
GFR NON-AFRICAN AMERICAN: >60
GLUCOSE BLD-MCNC: 114 MG/DL (ref 70–99)
HCT VFR BLD CALC: 31 % (ref 40.5–52.5)
HEMOGLOBIN: 10.3 G/DL (ref 13.5–17.5)
MCH RBC QN AUTO: 33.4 PG (ref 26–34)
MCHC RBC AUTO-ENTMCNC: 33.2 G/DL (ref 31–36)
MCV RBC AUTO: 100.7 FL (ref 80–100)
PDW BLD-RTO: 17.3 % (ref 12.4–15.4)
PLATELET # BLD: 274 K/UL (ref 135–450)
PMV BLD AUTO: 7.5 FL (ref 5–10.5)
POTASSIUM SERPL-SCNC: 3.8 MMOL/L (ref 3.5–5.1)
RBC # BLD: 3.08 M/UL (ref 4.2–5.9)
SODIUM BLD-SCNC: 134 MMOL/L (ref 136–145)
WBC # BLD: 11.3 K/UL (ref 4–11)

## 2021-12-12 PROCEDURE — 2500000003 HC RX 250 WO HCPCS: Performed by: NURSE PRACTITIONER

## 2021-12-12 PROCEDURE — 6370000000 HC RX 637 (ALT 250 FOR IP): Performed by: NURSE PRACTITIONER

## 2021-12-12 PROCEDURE — 6360000002 HC RX W HCPCS: Performed by: SURGERY

## 2021-12-12 PROCEDURE — 80048 BASIC METABOLIC PNL TOTAL CA: CPT

## 2021-12-12 PROCEDURE — 6360000002 HC RX W HCPCS: Performed by: NURSE PRACTITIONER

## 2021-12-12 PROCEDURE — 74018 RADEX ABDOMEN 1 VIEW: CPT

## 2021-12-12 PROCEDURE — 85027 COMPLETE CBC AUTOMATED: CPT

## 2021-12-12 PROCEDURE — 6360000002 HC RX W HCPCS: Performed by: FAMILY MEDICINE

## 2021-12-12 PROCEDURE — 36415 COLL VENOUS BLD VENIPUNCTURE: CPT

## 2021-12-12 PROCEDURE — 99024 POSTOP FOLLOW-UP VISIT: CPT | Performed by: SURGERY

## 2021-12-12 PROCEDURE — 2580000003 HC RX 258: Performed by: FAMILY MEDICINE

## 2021-12-12 PROCEDURE — 1200000000 HC SEMI PRIVATE

## 2021-12-12 RX ORDER — METOCLOPRAMIDE HYDROCHLORIDE 5 MG/ML
10 INJECTION INTRAMUSCULAR; INTRAVENOUS EVERY 6 HOURS
Status: DISCONTINUED | OUTPATIENT
Start: 2021-12-12 | End: 2021-12-17 | Stop reason: HOSPADM

## 2021-12-12 RX ADMIN — OXYCODONE AND ACETAMINOPHEN 1 TABLET: 5; 325 TABLET ORAL at 16:59

## 2021-12-12 RX ADMIN — POTASSIUM CHLORIDE, DEXTROSE MONOHYDRATE AND SODIUM CHLORIDE: 150; 5; 450 INJECTION, SOLUTION INTRAVENOUS at 06:54

## 2021-12-12 RX ADMIN — METOCLOPRAMIDE 10 MG: 5 INJECTION, SOLUTION INTRAMUSCULAR; INTRAVENOUS at 14:05

## 2021-12-12 RX ADMIN — SODIUM CHLORIDE 25 ML: 9 INJECTION, SOLUTION INTRAVENOUS at 16:57

## 2021-12-12 RX ADMIN — OXYCODONE AND ACETAMINOPHEN 1 TABLET: 5; 325 TABLET ORAL at 00:03

## 2021-12-12 RX ADMIN — PIPERACILLIN AND TAZOBACTAM 3375 MG: 3; .375 INJECTION, POWDER, LYOPHILIZED, FOR SOLUTION INTRAVENOUS at 09:52

## 2021-12-12 RX ADMIN — HYDROMORPHONE HYDROCHLORIDE 0.5 MG: 1 INJECTION, SOLUTION INTRAMUSCULAR; INTRAVENOUS; SUBCUTANEOUS at 14:03

## 2021-12-12 RX ADMIN — ANTACID TABLETS 500 MG: 500 TABLET, CHEWABLE ORAL at 00:03

## 2021-12-12 RX ADMIN — SODIUM CHLORIDE 25 ML: 9 INJECTION, SOLUTION INTRAVENOUS at 09:51

## 2021-12-12 RX ADMIN — POTASSIUM CHLORIDE, DEXTROSE MONOHYDRATE AND SODIUM CHLORIDE: 150; 5; 450 INJECTION, SOLUTION INTRAVENOUS at 17:56

## 2021-12-12 RX ADMIN — METOCLOPRAMIDE 10 MG: 5 INJECTION, SOLUTION INTRAMUSCULAR; INTRAVENOUS at 20:57

## 2021-12-12 RX ADMIN — OXYCODONE AND ACETAMINOPHEN 1 TABLET: 5; 325 TABLET ORAL at 08:43

## 2021-12-12 RX ADMIN — ENOXAPARIN SODIUM 40 MG: 100 INJECTION SUBCUTANEOUS at 08:35

## 2021-12-12 RX ADMIN — HYDROMORPHONE HYDROCHLORIDE 0.5 MG: 1 INJECTION, SOLUTION INTRAMUSCULAR; INTRAVENOUS; SUBCUTANEOUS at 20:57

## 2021-12-12 RX ADMIN — Medication 10 ML: at 21:15

## 2021-12-12 RX ADMIN — PIPERACILLIN AND TAZOBACTAM 3375 MG: 3; .375 INJECTION, POWDER, LYOPHILIZED, FOR SOLUTION INTRAVENOUS at 16:58

## 2021-12-12 RX ADMIN — PIPERACILLIN AND TAZOBACTAM 3375 MG: 3; .375 INJECTION, POWDER, LYOPHILIZED, FOR SOLUTION INTRAVENOUS at 01:46

## 2021-12-12 RX ADMIN — HYDROMORPHONE HYDROCHLORIDE 0.5 MG: 1 INJECTION, SOLUTION INTRAMUSCULAR; INTRAVENOUS; SUBCUTANEOUS at 03:15

## 2021-12-12 ASSESSMENT — PAIN DESCRIPTION - LOCATION
LOCATION: ABDOMEN

## 2021-12-12 ASSESSMENT — PAIN SCALES - GENERAL
PAINLEVEL_OUTOF10: 5
PAINLEVEL_OUTOF10: 5
PAINLEVEL_OUTOF10: 4
PAINLEVEL_OUTOF10: 6
PAINLEVEL_OUTOF10: 7
PAINLEVEL_OUTOF10: 6
PAINLEVEL_OUTOF10: 7
PAINLEVEL_OUTOF10: 6
PAINLEVEL_OUTOF10: 5

## 2021-12-12 ASSESSMENT — PAIN DESCRIPTION - ORIENTATION
ORIENTATION: MID

## 2021-12-12 ASSESSMENT — PAIN DESCRIPTION - PAIN TYPE
TYPE: SURGICAL PAIN

## 2021-12-12 ASSESSMENT — PAIN DESCRIPTION - DESCRIPTORS
DESCRIPTORS: ACHING
DESCRIPTORS: DULL;ACHING
DESCRIPTORS: DULL;ACHING

## 2021-12-12 ASSESSMENT — PAIN - FUNCTIONAL ASSESSMENT
PAIN_FUNCTIONAL_ASSESSMENT: ACTIVITIES ARE NOT PREVENTED

## 2021-12-12 ASSESSMENT — PAIN DESCRIPTION - ONSET
ONSET: ON-GOING

## 2021-12-12 ASSESSMENT — PAIN DESCRIPTION - FREQUENCY
FREQUENCY: CONTINUOUS

## 2021-12-12 ASSESSMENT — PAIN DESCRIPTION - PROGRESSION
CLINICAL_PROGRESSION: GRADUALLY WORSENING
CLINICAL_PROGRESSION: GRADUALLY IMPROVING
CLINICAL_PROGRESSION: GRADUALLY IMPROVING

## 2021-12-12 NOTE — PLAN OF CARE
Problem: Nausea/Vomiting:  Goal: Absence of nausea/vomiting  Description: Absence of nausea/vomiting  12/12/2021 1057 by Chalino Valdovinos RN  Outcome: Ongoing  12/12/2021 1041 by Sara Strauss RN  Outcome: Ongoing  Goal: Able to drink  Description: Able to drink  12/12/2021 1057 by Chalino Valdovinos RN  Outcome: Ongoing  12/12/2021 1041 by Sara Strauss RN  Outcome: Ongoing  Goal: Able to eat  Description: Able to eat  12/12/2021 1057 by Chalino Valdovinos RN  Outcome: Ongoing  12/12/2021 1041 by Sara Strauss RN  Outcome: Ongoing  Goal: Ability to achieve adequate nutritional intake will improve  Description: Ability to achieve adequate nutritional intake will improve  12/12/2021 1057 by Chalino Valdovinos RN  Outcome: Ongoing  12/12/2021 1041 by Sara Strauss RN  Outcome: Ongoing

## 2021-12-12 NOTE — PROGRESS NOTES
11/29/2021      Mag:    Lab Results   Component Value Date    MG 2.20 12/02/2021    MG 1.70 12/02/2021     Phos:     Lab Results   Component Value Date    PHOS 2.7 12/01/2021    PHOS 3.4 10/14/2021      Coags:   Lab Results   Component Value Date    PROTIME 15.8 12/05/2021    INR 1.38 12/05/2021    APTT 40.9 12/01/2021       Cultures:  Anaerobic culture  No results found for: LABANAE  Fungus stain  No results found for requested labs within last 30 days. Gram stain  No results found for requested labs within last 30 days. Organism  No results found for: [facility]  Surgical culture  No results found for: CXSURG  Blood culture 1  Results in Past 30 Days  Result Component Current Result Ref Range Previous Result Ref Range   Blood Culture, Routine No Growth after 4 days of incubation. (12/5/2021)  Not in Time Range      Blood culture 2  Results in Past 30 Days  Result Component Current Result Ref Range Previous Result Ref Range   Culture, Blood 2 No Growth after 4 days of incubation. (12/5/2021)  Not in Time Range      Fecal occult  No results found for requested labs within last 30 days. GI bacterial pathogens by PCR  Results in Past 30 Days  Result Component Current Result Ref Range Previous Result Ref Range   GI Bacterial Pathogens By PCR No Shigella spp/EIEC DNA detected  No Shiga toxin-producing gene(s) detected  No Campylobacter spp. (jejuni and coli)DNA detected  No Salmonella spp. DNA detected  Normal Range:  None detected   (11/30/2021)  Not in Time Range      C. difficile  No results found for requested labs within last 30 days. Urine culture  Lab Results   Component Value Date    LABURIN No growth at 18-36 hours 04/12/2018       Pathology:  OR 12/6/2021--FINAL DIAGNOSIS:     Ileocolonic segmental resection and omental resection:   - Invasive colonic adenocarcinoma, moderately differentiated. - Margins not involved. - One pericolonic lymph nodes positive for metastatic carcinoma (1/13).    -

## 2021-12-12 NOTE — PLAN OF CARE
Problem: Nausea/Vomiting:  Goal: Absence of nausea/vomiting  Description: Absence of nausea/vomiting  Outcome: Ongoing  Goal: Able to drink  Description: Able to drink  Outcome: Ongoing  Goal: Able to eat  Description: Able to eat  Outcome: Ongoing  Goal: Ability to achieve adequate nutritional intake will improve  Description: Ability to achieve adequate nutritional intake will improve  Outcome: Ongoing     Problem: Pain:  Goal: Pain level will decrease  Description: Pain level will decrease  Outcome: Ongoing  Goal: Control of acute pain  Description: Control of acute pain  Outcome: Ongoing     Problem: Falls - Risk of:  Goal: Will remain free from falls  Description: Will remain free from falls  Outcome: Ongoing  Goal: Absence of physical injury  Description: Absence of physical injury  Outcome: Ongoing

## 2021-12-12 NOTE — PROGRESS NOTES
[facility]   Progress Note      SUBJECTIVE:      Events noted. Is slowly getting better  Walks in the hallway. Had bowel movement. OBJECTIVE    Physical  VITALS:  /83   Pulse 108   Temp 98.2 °F (36.8 °C) (Oral)   Resp 16   Ht 5' 11\" (1.803 m)   Wt 182 lb 1.6 oz (82.6 kg)   SpO2 96%   BMI 25.40 kg/m²   TEMPERATURE:  Current - Temp: 98.2 °F (36.8 °C); Max - Temp  Av.1 °F (36.7 °C)  Min: 97.5 °F (36.4 °C)  Max: 98.3 °F (36.8 °C)  BLOOD PRESSURE RANGE:  Systolic (37YGT), ILX:271 , Min:107 , ODT:384   ; Diastolic (40NPK), QKR:82, Min:71, Max:86    24HR INTAKE/OUTPUT:    Intake/Output Summary (Last 24 hours) at 2021 1409  Last data filed at 2021 1036  Gross per 24 hour   Intake 4673.74 ml   Output    Net 4673.74 ml       Conscious alert oriented. Appears comfortable. No neck fullness. Respiratory efforts are normal.  Abdomen is not distended. No leg edema  No focal deficits.     Data  Labs:  General Labs:  CBC with Differential:    Lab Results   Component Value Date    WBC 11.3 2021    RBC 3.08 2021    RBC 5.11 2017    HGB 10.3 2021    HCT 31.0 2021     2021    .7 2021    MCH 33.4 2021    MCHC 33.2 2021    RDW 17.3 2021    LYMPHOPCT 10.1 2021    LYMPHOPCT 26.2 2017    MONOPCT 9.6 2021    BASOPCT 0.4 2021    MONOSABS 0.7 2021    LYMPHSABS 0.7 2021    EOSABS 0.1 2021    BASOSABS 0.0 2021     BMP:    Lab Results   Component Value Date     2021    K 3.8 2021    K 3.7 2021     2021    CO2 24 2021    BUN 4 2021    LABALBU 2.1 2021    CREATININE 0.6 2021    CALCIUM 7.9 2021    GFRAA >60 2021    LABGLOM >60 2021    GLUCOSE 114 2021    GLUCOSE 105 2017     Hepatic Function Panel:    Lab Results   Component Value Date    ALKPHOS 238 2021    ALT 21 2021 AST 44 12/05/2021    PROT 6.3 12/05/2021    PROT 7.4 08/21/2017    BILITOT 1.3 12/05/2021    BILIDIR 0.5 12/05/2021    IBILI 0.8 12/05/2021    LABALBU 2.1 12/05/2021     LDH:  No results found for: LDH  PT/INR:    Lab Results   Component Value Date    PROTIME 15.8 12/05/2021    INR 1.38 12/05/2021     PTT:    Lab Results   Component Value Date    APTT 40.9 12/01/2021   [APTT    Current Medications  Current Facility-Administered Medications: metoclopramide (REGLAN) injection 10 mg, 10 mg, IntraVENous, Q6H  oxyCODONE-acetaminophen (PERCOCET) 5-325 MG per tablet 1 tablet, 1 tablet, Oral, Q6H PRN  calcium carbonate (TUMS) chewable tablet 500 mg, 500 mg, Oral, TID PRN  potassium chloride (KLOR-CON M) extended release tablet 40 mEq, 40 mEq, Oral, PRN **OR** potassium bicarb-citric acid (EFFER-K) effervescent tablet 40 mEq, 40 mEq, Oral, PRN **OR** potassium chloride 10 mEq/100 mL IVPB (Peripheral Line), 10 mEq, IntraVENous, PRN  dextrose 5 % and 0.45 % NaCl with KCl 20 mEq infusion, , IntraVENous, Continuous  glucose (GLUTOSE) 40 % oral gel 15 g, 15 g, Oral, PRN  dextrose 50 % IV solution, 12.5 g, IntraVENous, PRN  glucagon (rDNA) injection 1 mg, 1 mg, IntraMUSCular, PRN  dextrose 5 % solution, 100 mL/hr, IntraVENous, PRN  HYDROmorphone HCl PF (DILAUDID) injection 0.5 mg, 0.5 mg, IntraVENous, Q3H PRN  morphine (PF) injection 2 mg, 2 mg, IntraVENous, Q3H PRN  ketorolac (TORADOL) injection 15 mg, 15 mg, IntraVENous, Q6H PRN  piperacillin-tazobactam (ZOSYN) 3,375 mg in dextrose 5 % 50 mL IVPB extended infusion (mini-bag), 3,375 mg, IntraVENous, Q8H  LORazepam (ATIVAN) injection 0.5 mg, 0.5 mg, IntraVENous, Q6H PRN  sodium chloride flush 0.9 % injection 5-40 mL, 5-40 mL, IntraVENous, 2 times per day  sodium chloride flush 0.9 % injection 5-40 mL, 5-40 mL, IntraVENous, PRN  0.9 % sodium chloride infusion, 25 mL, IntraVENous, PRN  enoxaparin (LOVENOX) injection 40 mg, 40 mg, SubCUTAneous, Daily  ondansetron (ZOFRAN-ODT) disintegrating tablet 4 mg, 4 mg, Oral, Q8H PRN **OR** ondansetron (ZOFRAN) injection 4 mg, 4 mg, IntraVENous, Q6H PRN  polyethylene glycol (GLYCOLAX) packet 17 g, 17 g, Oral, Daily PRN  acetaminophen (TYLENOL) tablet 650 mg, 650 mg, Oral, Q6H PRN **OR** acetaminophen (TYLENOL) suppository 650 mg, 650 mg, Rectal, Q6H PRN    ASSESSMENT AND PLAN    41-year-old gentleman has    1. Metastatic colon cancer    -Status post FOLFOX, FOLFIRI in the past.  -Now has progressive disease  -See #2    2. Small bowel obstruction:    -Due to malignancy, carcinomatosis  -Status post ileocolonic resection and omental resection.  -Pathology was consistent with metastatic adenocarcinoma.    -We will consider Stivarga or Lonsurf or explore clinical trial as further steps in management.   The patient wants to pursue more treatments      Taylor Sanchez MD

## 2021-12-12 NOTE — PROGRESS NOTES
[facility]    12/12/2021 8:22 AM        Name: Sherrie Ramos . Admitted: 12/5/2021  Primary Care Provider: Abebe Bynum MD (Tel: [phone])      Subjective:  . POD # 6  from  Exploratory laparotomy, ileocolonic resection of distal  ileum and prior hepatic flexure region anastomosis, omental resection.     Pt with lung and liver mets   NG removed on Friday 12/10/21  + loose stools   On full liquids  Not eating much , looks bloated today     Day # 7 of zosyn.     Pro jeni .21    Reviewed interval ancillary notes    Current Medications  oxyCODONE-acetaminophen (PERCOCET) 5-325 MG per tablet 1 tablet, Q6H PRN  calcium carbonate (TUMS) chewable tablet 500 mg, TID PRN  potassium chloride (KLOR-CON M) extended release tablet 40 mEq, PRN   Or  potassium bicarb-citric acid (EFFER-K) effervescent tablet 40 mEq, PRN   Or  potassium chloride 10 mEq/100 mL IVPB (Peripheral Line), PRN  dextrose 5 % and 0.45 % NaCl with KCl 20 mEq infusion, Continuous  glucose (GLUTOSE) 40 % oral gel 15 g, PRN  dextrose 50 % IV solution, PRN  glucagon (rDNA) injection 1 mg, PRN  dextrose 5 % solution, PRN  HYDROmorphone HCl PF (DILAUDID) injection 0.5 mg, Q3H PRN  morphine (PF) injection 2 mg, Q3H PRN  ketorolac (TORADOL) injection 15 mg, Q6H PRN  piperacillin-tazobactam (ZOSYN) 3,375 mg in dextrose 5 % 50 mL IVPB extended infusion (mini-bag), Q8H  LORazepam (ATIVAN) injection 0.5 mg, Q6H PRN  sodium chloride flush 0.9 % injection 5-40 mL, 2 times per day  sodium chloride flush 0.9 % injection 5-40 mL, PRN  0.9 % sodium chloride infusion, PRN  enoxaparin (LOVENOX) injection 40 mg, Daily  ondansetron (ZOFRAN-ODT) disintegrating tablet 4 mg, Q8H PRN   Or  ondansetron (ZOFRAN) injection 4 mg, Q6H PRN  polyethylene glycol (GLYCOLAX) packet 17 g, Daily PRN  acetaminophen (TYLENOL) tablet 650 mg, Q6H PRN   Or  acetaminophen (TYLENOL) suppository 650 mg, Q6H PRN        Objective:  /71   Pulse 87   Temp 97.9 °F (36.6 °C) (Oral)   Resp 16   Ht 5' 11\" (1.803 m)   Wt 182 lb 1.6 oz (82.6 kg)   SpO2 97%   BMI 25.40 kg/m²     Intake/Output Summary (Last 24 hours) at 12/12/2021 0589  Last data filed at 12/11/2021 1443  Gross per 24 hour   Intake 480 ml   Output    Net 480 ml      Wt Readings from Last 3 Encounters:   12/11/21 182 lb 1.6 oz (82.6 kg)   12/01/21 169 lb 6.4 oz (76.8 kg)   10/13/21 175 lb 3.2 oz (79.5 kg)       General appearance:  Appears comfortable in the bed. He remains depressed but cooperative   Eyes: Sclera clear. Pupils equal.  ENT: Moist oral mucosa. Trachea midline, no adenopathy. Cardiovascular: Regular rhythm, normal S1, S2. No murmur. No edema in lower extremities  Respiratory: Not using accessory muscles. Good inspiratory effort. Clear to auscultation bilaterally, no wheeze or crackles. GI: Abdomen softly distended with tympanic  bowel sounds present. Operative dressing CDI  Musculoskeletal: No cyanosis in digits, neck supple  Neurology: CN 2-12 grossly intact. No speech or motor deficits  Psych: Normal affect. Alert and oriented in time, place and person  Skin: Warm, dry, normal turgor    Labs and Tests:  CBC:   Recent Labs     12/10/21  0540 12/11/21  0459 12/12/21  1043   WBC 2.7* 6.6 11.3*   HGB 7.8* 9.0* 10.3*   * 186 274     BMP:    Recent Labs     12/10/21  0540 12/11/21  0459 12/12/21  1043    132* 134*   K 3.2* 3.8 3.8    101 102   CO2 26 23 24   BUN 11 6* 4*   CREATININE 0.6* 0.6* 0.6*   GLUCOSE 107* 114* 114*     Hepatic:   No results for input(s): AST, ALT, ALB, BILITOT, ALKPHOS in the last 72 hours.     CT abd/pelvis  1. Worsening small bowel obstruction with transition point at the ileocolic   anastomosis.  There is focal 2.2 cm area of wall thickening which may reflect   underlying stricture or mass.  No free air or pneumatosis.    2. Otherwise improved small bowel wall thickening. 3. Moderate to large volume ascites.  There is overall anasarca with   subcutaneous edema and pleural effusions as well. 4. New ground-glass attenuation and slightly increasing consolidative changes   within the lung bases suspicious for superimposed pneumonia.  Right lower   lobe mass is otherwise partially obscured due to atelectasis. 5. Hepatic steatosis.  No significant interval change in hepatic lesions. 6. Splenomegaly. Isaías Quick is a subtle area of low attenuation within the   lateral spleen, nonspecific and could be related to timing of the contrast   bolus; however, findings could also be related to developing splenic infarct. Problem List  Active Problems:    Bowel obstruction (HCC)    Carcinomatosis ([address]    Omental mass  Resolved Problems:    * No resolved hospital problems. *       Assessment & Plan:   1. Bowel Resection:  POD # 6 from Exploratory Lap with ileocolonic resection of distal ileum with prior hepatic flexure anastomosis with omental resection. + BM,  Not eating much of full liquids due to bloating. Will resume his previous Reglan    He is on zosyn day # 7. IS and ambulation encouraged. 2. Uncontrolled pain:  Improved with IV dilaudid , percocet added    3. Colon Cancer with carcinomatosis and intra abdominal ascites, liver and lung mets,  He is followed by Dr Florentin Muse. Anticipates additional therapy when he heals from current surgery or possible clinical trial at    4. Anemia:  hgb stable      Diet: ADULT ORAL NUTRITION SUPPLEMENT; Breakfast, Lunch, Dinner, HS Snack; Clear Liquid Oral Supplement  ADULT DIET;  Full Liquid  Code:Full Code  DVT PPX      VIJAY Anderson - CNP   12/12/2021 8:22 AM

## 2021-12-12 NOTE — PROGRESS NOTES
Assessment completed. Patient awake, alert and oriented x 4 and able to make all his needs known. Ambulated independently to bathroom and back to bed, some SOB noted with exertion. C/o pain to abd. JORGE dressing to midline CDI. POC and education reviewed with patient and mutually agreed upon. Medications administered as ordered. All needs met at this time. Call light in reach. Will continue to monitor. 1245: Was summoned to patient's room. On entering noted patient having an emesis of clear pink fluid (patient drank ensure). Elevated HOB and educated patient on risk of aspiration. Patient stated that whenever he talks too much it produces bile and he has a tendency to vomit. He stated that he was talking to the doctor a few minutes earlier. Denied any more nausea and refused nausea medicine. Linens changes. Patient sitting up in chair and watching television. Denies any needs at this time. Call light in reach. Will continue to monitor. 1830: Pt denies any nausea with any PO intake.

## 2021-12-12 NOTE — PROGRESS NOTES
Assessment complete. Alert, oriented x4. Surgical incision to abdomen clean, dry, intact. Complaining of abdominal pain; patient requesting to take Diladuid instead of Toradol as he feels it brings better relief; given PRN Dilaudid. Ambulation encouraged. IS educated, encouraged. SCDs in place. The care plan and education has been reviewed and mutually agreed upon with the patient. In bed, bed in lowest position, call light and table within reach. No further needs expressed at this time. 2341  Patient requesting Tums for heartburn. Message sent to hospitalist.    0003  PRN Tums given. Complaining of pain, given PRN Percocet. 0315  Complaining of pain, given PRN Dilaudid by RN Collette Madsen.

## 2021-12-13 ENCOUNTER — APPOINTMENT (OUTPATIENT)
Dept: GENERAL RADIOLOGY | Age: 61
DRG: 329 | End: 2021-12-13
Payer: COMMERCIAL

## 2021-12-13 LAB
ANION GAP SERPL CALCULATED.3IONS-SCNC: 8 MMOL/L (ref 3–16)
BUN BLDV-MCNC: 3 MG/DL (ref 7–20)
CALCIUM SERPL-MCNC: 7.7 MG/DL (ref 8.3–10.6)
CHLORIDE BLD-SCNC: 101 MMOL/L (ref 99–110)
CO2: 23 MMOL/L (ref 21–32)
CREAT SERPL-MCNC: 0.5 MG/DL (ref 0.8–1.3)
GFR AFRICAN AMERICAN: >60
GFR NON-AFRICAN AMERICAN: >60
GLUCOSE BLD-MCNC: 112 MG/DL (ref 70–99)
HCT VFR BLD CALC: 29.7 % (ref 40.5–52.5)
HEMOGLOBIN: 10 G/DL (ref 13.5–17.5)
MCH RBC QN AUTO: 33.4 PG (ref 26–34)
MCHC RBC AUTO-ENTMCNC: 33.5 G/DL (ref 31–36)
MCV RBC AUTO: 100 FL (ref 80–100)
PDW BLD-RTO: 17 % (ref 12.4–15.4)
PLATELET # BLD: 185 K/UL (ref 135–450)
PMV BLD AUTO: 7.8 FL (ref 5–10.5)
POTASSIUM SERPL-SCNC: 3.9 MMOL/L (ref 3.5–5.1)
RBC # BLD: 2.98 M/UL (ref 4.2–5.9)
SODIUM BLD-SCNC: 132 MMOL/L (ref 136–145)
WBC # BLD: 7 K/UL (ref 4–11)

## 2021-12-13 PROCEDURE — 6360000002 HC RX W HCPCS: Performed by: FAMILY MEDICINE

## 2021-12-13 PROCEDURE — 85027 COMPLETE CBC AUTOMATED: CPT

## 2021-12-13 PROCEDURE — 6360000002 HC RX W HCPCS: Performed by: SURGERY

## 2021-12-13 PROCEDURE — 6360000002 HC RX W HCPCS: Performed by: NURSE PRACTITIONER

## 2021-12-13 PROCEDURE — 1200000000 HC SEMI PRIVATE

## 2021-12-13 PROCEDURE — 99024 POSTOP FOLLOW-UP VISIT: CPT | Performed by: SURGERY

## 2021-12-13 PROCEDURE — APPSS15 APP SPLIT SHARED TIME 0-15 MINUTES: Performed by: NURSE PRACTITIONER

## 2021-12-13 PROCEDURE — 2580000003 HC RX 258: Performed by: FAMILY MEDICINE

## 2021-12-13 PROCEDURE — APPNB30 APP NON BILLABLE TIME 0-30 MINS: Performed by: NURSE PRACTITIONER

## 2021-12-13 PROCEDURE — 80048 BASIC METABOLIC PNL TOTAL CA: CPT

## 2021-12-13 PROCEDURE — 36415 COLL VENOUS BLD VENIPUNCTURE: CPT

## 2021-12-13 PROCEDURE — 74019 RADEX ABDOMEN 2 VIEWS: CPT

## 2021-12-13 PROCEDURE — C9113 INJ PANTOPRAZOLE SODIUM, VIA: HCPCS | Performed by: NURSE PRACTITIONER

## 2021-12-13 PROCEDURE — 2500000003 HC RX 250 WO HCPCS: Performed by: NURSE PRACTITIONER

## 2021-12-13 RX ORDER — PANTOPRAZOLE SODIUM 40 MG/10ML
40 INJECTION, POWDER, LYOPHILIZED, FOR SOLUTION INTRAVENOUS DAILY
Status: DISCONTINUED | OUTPATIENT
Start: 2021-12-13 | End: 2021-12-17 | Stop reason: HOSPADM

## 2021-12-13 RX ORDER — BISACODYL 10 MG
10 SUPPOSITORY, RECTAL RECTAL ONCE
Status: COMPLETED | OUTPATIENT
Start: 2021-12-13 | End: 2021-12-14

## 2021-12-13 RX ADMIN — METOCLOPRAMIDE 10 MG: 5 INJECTION, SOLUTION INTRAMUSCULAR; INTRAVENOUS at 21:15

## 2021-12-13 RX ADMIN — HYDROMORPHONE HYDROCHLORIDE 0.5 MG: 1 INJECTION, SOLUTION INTRAMUSCULAR; INTRAVENOUS; SUBCUTANEOUS at 03:01

## 2021-12-13 RX ADMIN — METOCLOPRAMIDE 10 MG: 5 INJECTION, SOLUTION INTRAMUSCULAR; INTRAVENOUS at 08:42

## 2021-12-13 RX ADMIN — PIPERACILLIN AND TAZOBACTAM 3375 MG: 3; .375 INJECTION, POWDER, LYOPHILIZED, FOR SOLUTION INTRAVENOUS at 09:57

## 2021-12-13 RX ADMIN — PANTOPRAZOLE SODIUM 40 MG: 40 INJECTION, POWDER, FOR SOLUTION INTRAVENOUS at 15:54

## 2021-12-13 RX ADMIN — PIPERACILLIN AND TAZOBACTAM 3375 MG: 3; .375 INJECTION, POWDER, LYOPHILIZED, FOR SOLUTION INTRAVENOUS at 01:06

## 2021-12-13 RX ADMIN — METOCLOPRAMIDE 10 MG: 5 INJECTION, SOLUTION INTRAMUSCULAR; INTRAVENOUS at 01:02

## 2021-12-13 RX ADMIN — POTASSIUM CHLORIDE, DEXTROSE MONOHYDRATE AND SODIUM CHLORIDE: 150; 5; 450 INJECTION, SOLUTION INTRAVENOUS at 06:13

## 2021-12-13 RX ADMIN — Medication 10 ML: at 21:16

## 2021-12-13 RX ADMIN — HYDROMORPHONE HYDROCHLORIDE 0.5 MG: 1 INJECTION, SOLUTION INTRAMUSCULAR; INTRAVENOUS; SUBCUTANEOUS at 21:15

## 2021-12-13 RX ADMIN — HYDROMORPHONE HYDROCHLORIDE 0.5 MG: 1 INJECTION, SOLUTION INTRAMUSCULAR; INTRAVENOUS; SUBCUTANEOUS at 08:42

## 2021-12-13 RX ADMIN — HYDROMORPHONE HYDROCHLORIDE 0.5 MG: 1 INJECTION, SOLUTION INTRAMUSCULAR; INTRAVENOUS; SUBCUTANEOUS at 15:55

## 2021-12-13 RX ADMIN — KETOROLAC TROMETHAMINE 15 MG: 15 INJECTION, SOLUTION INTRAMUSCULAR; INTRAVENOUS at 08:42

## 2021-12-13 RX ADMIN — METOCLOPRAMIDE 10 MG: 5 INJECTION, SOLUTION INTRAMUSCULAR; INTRAVENOUS at 15:54

## 2021-12-13 RX ADMIN — ENOXAPARIN SODIUM 40 MG: 100 INJECTION SUBCUTANEOUS at 09:27

## 2021-12-13 ASSESSMENT — PAIN SCALES - GENERAL
PAINLEVEL_OUTOF10: 6
PAINLEVEL_OUTOF10: 7
PAINLEVEL_OUTOF10: 6
PAINLEVEL_OUTOF10: 4
PAINLEVEL_OUTOF10: 5
PAINLEVEL_OUTOF10: 6
PAINLEVEL_OUTOF10: 5
PAINLEVEL_OUTOF10: 6

## 2021-12-13 ASSESSMENT — PAIN DESCRIPTION - PAIN TYPE: TYPE: SURGICAL PAIN

## 2021-12-13 ASSESSMENT — PAIN DESCRIPTION - LOCATION: LOCATION: ABDOMEN

## 2021-12-13 NOTE — PROGRESS NOTES
[facility]    12/13/2021 8:37 AM        Name: Carlos Aguilar . Admitted: 12/5/2021  Primary Care Provider: Bushra Krause MD (Tel: [phone])      Subjective:  . POD # 7 from  Exploratory laparotomy, ileocolonic resection of distal  ileum and prior hepatic flexure region anastomosis, omental resection.   Remains afebrile   Seen this am after he walked himself in the halls  Had small emesis yesterday      Pt with lung and liver mets   NG removed on Friday 12/10/21  + loose stools 4- 5 per day which is his \"normal at home\"   On full liquids  Not eating much , looks bloated again  today         Reviewed interval ancillary notes    Current Medications  metoclopramide (REGLAN) injection 10 mg, Q6H  oxyCODONE-acetaminophen (PERCOCET) 5-325 MG per tablet 1 tablet, Q6H PRN  calcium carbonate (TUMS) chewable tablet 500 mg, TID PRN  potassium chloride (KLOR-CON M) extended release tablet 40 mEq, PRN   Or  potassium bicarb-citric acid (EFFER-K) effervescent tablet 40 mEq, PRN   Or  potassium chloride 10 mEq/100 mL IVPB (Peripheral Line), PRN  dextrose 5 % and 0.45 % NaCl with KCl 20 mEq infusion, Continuous  glucose (GLUTOSE) 40 % oral gel 15 g, PRN  dextrose 50 % IV solution, PRN  glucagon (rDNA) injection 1 mg, PRN  dextrose 5 % solution, PRN  HYDROmorphone HCl PF (DILAUDID) injection 0.5 mg, Q3H PRN  morphine (PF) injection 2 mg, Q3H PRN  ketorolac (TORADOL) injection 15 mg, Q6H PRN  piperacillin-tazobactam (ZOSYN) 3,375 mg in dextrose 5 % 50 mL IVPB extended infusion (mini-bag), Q8H  LORazepam (ATIVAN) injection 0.5 mg, Q6H PRN  sodium chloride flush 0.9 % injection 5-40 mL, 2 times per day  sodium chloride flush 0.9 % injection 5-40 mL, PRN  0.9 % sodium chloride infusion, PRN  enoxaparin (LOVENOX) injection 40 mg, Daily  ondansetron (ZOFRAN-ODT) disintegrating tablet 4 mg, Q8H PRN   Or  ondansetron anastomosis. Candy Loll is focal 2.2 cm area of wall thickening which may reflect   underlying stricture or mass.  No free air or pneumatosis. 2. Otherwise improved small bowel wall thickening. 3. Moderate to large volume ascites.  There is overall anasarca with   subcutaneous edema and pleural effusions as well. 4. New ground-glass attenuation and slightly increasing consolidative changes   within the lung bases suspicious for superimposed pneumonia.  Right lower   lobe mass is otherwise partially obscured due to atelectasis. 5. Hepatic steatosis.  No significant interval change in hepatic lesions. 6. Splenomegaly. Candy Loll is a subtle area of low attenuation within the   lateral spleen, nonspecific and could be related to timing of the contrast   bolus; however, findings could also be related to developing splenic infarct. Abd xray 12/12/21    FINDINGS:   There are proved with mild residual dilated loops of small bowel within the   right mid abdomen likely due to resolving and amic ileus.  Suture material is   present within the right upper quadrant.  The nasogastric tube is been   removed.  Degenerative changes involve the lumbar spine and bilateral hips.           Impression   1. Improving adynamic ileus. Problem List  Active Problems:    Bowel obstruction (HCC)    Carcinomatosis ([address]    Omental mass  Resolved Problems:    * No resolved hospital problems. *       Assessment & Plan:   1. Bowel Resection:  POD # 7 from Exploratory Lap with ileocolonic resection of distal ileum with prior hepatic flexure anastomosis with omental resection. + BM,  Not eating much of full liquids due to bloating. On Reglan. 2. Afebrile with normal WBC,  He has completed 7 days of zosyn , will stop antibiotics. He is walking in the halls and using IS   3. GERD sx : I added PPI therapy   4. Neoplastic/ post operative pain:  Improving   5. Anemia of chronic disease: stable  6.  Moderate malnutrition due to surgery/ cancer;  Dietary following and offering supplements. 7. Colon Cancer with carcinomatosis and intra abdominal ascites, liver and lung mets,  He is followed by Dr Ackerman. Anticipates additional therapy when he heals from current surgery or possible clinical trial at      Pt anticipates eventual d/c home to his condo. He has a room mate who with assist with his care if needed. He walks in the halls with no assistance. I offered information on [employer] for support groups etc and he declined       Diet: ADULT ORAL NUTRITION SUPPLEMENT; Breakfast, Lunch, Dinner, HS Snack; Clear Liquid Oral Supplement  ADULT DIET;  Full Liquid  Code:Full Code  DVT PPX      VIJAY Mena CNP   12/13/2021 8:37 AM

## 2021-12-13 NOTE — PROGRESS NOTES
[facility]        Progress Note    Patient Name: Jose Guevara  MRN: [id number]  YOB: 1960  Date of Evaluation: 2021    Subjective:  No acute events overnight  Pain controlled  Having nausea, no further vomiting; tolerating small amounts of full liquids, not hungry for more  Passing flatus and having diarrhea   Resting in bed at this time    Post-Operative Day #7      Vital Signs:  Patient Vitals for the past 24 hrs:   BP Temp Temp src Pulse Resp SpO2   21 0745 100/72 98.1 °F (36.7 °C) Oral 97 16 98 %   21 2000 111/71 98.3 °F (36.8 °C) Oral 97 18 98 %   21 1659 118/81 98.2 °F (36.8 °C) Oral 98 16 98 %   21 1245 114/83 98.2 °F (36.8 °C) Oral 108 16 96 %      TEMPERATURE HISTORY 24H: Temp (24hrs), Av.2 °F (36.8 °C), Min:98.1 °F (36.7 °C), Max:98.3 °F (36.8 °C)    BLOOD PRESSURE HISTORY: Systolic (83GYS), NEQ:404 , Min:100 , ZXI:487    Diastolic (97OCJ), HXR:69, Min:71, Max:86      Intake/Output:  I/O last 3 completed shifts: In: 4854.8 [P.O.:120; I.V.:4527.6; IV Piggyback:207.2]  Out: -   No intake/output data recorded. Drain/tube Output:       Physical Exam:  General: awake, alert, oriented to person, place, time  Lungs: unlabored respirations  Abdomen: soft, mildly distended, incisional tenderness only, bowel sounds hypoactive  Skin/Wound: JORGE dressing in place, no drainage or surrounding erythema noted    Labs:  CBC:    Recent Labs     21  0459 21  1043 21  0829   WBC 6.6 11.3* 7.0   HGB 9.0* 10.3* 10.0*   HCT 26.9* 31.0* 29.7*    274 185     BMP:    Recent Labs     21  0459 21  1043 21  0829   * 134* 132*   K 3.8 3.8 3.9    102 101   CO2 23 24 23   BUN 6* 4* 3*   CREATININE 0.6* 0.6* 0.5*   GLUCOSE 114* 114* 112*     Hepatic:    No results for input(s): AST, ALT, ALB, BILITOT, ALKPHOS in the last 72 hours.   Amylase:  No results found for: AMYLASE  Lipase:    Lab pericolonic lymph nodes positive for metastatic carcinoma (1/13). - Omentum involved by adenocarcinoma. Procedure: Segmental resection of ileocolonic anastomosis   Tumor Site: ileocolonic anastomosis site   Tumor Size: Greatest dimension (centimeter): 3.7        Additional dimensions (centimeters): 2.2 x 0.5   Macroscopic tumor perforation: Not identified   Histologic Type: Adenocarcinoma   Histologic Grade: G2 moderate differentiated   Tumor Extension: Tumor invades through muscularis propria into subserosal   adipose tissue. MARGINS   All margins are uninvolved by invasive carcinoma, high grade dysplasia /   intramucosal carcinoma, and low grade dysplasia      Margins examined: Proximal, distal, and radial    + Distance of invasive carcinoma from closest margin (millimeters or   centimeters): 30 mm    + Specify closest margin: Radial     Treatment Effect (applicable to carcinomas treated with neoadjuvant   therapy): No known pre surgical treatment   Lymph-Vascular Invasion: Not identified   Perineural Invasion: Not identified   + Tumor Budding (Note I)         Low score (0-4)   Type of Polyp in Which Invasive Carcinoma Arose: Not applicable   Tumor deposits: Present     Regional lymph nodes     Number of Lymph nodes Involved: 1     Number of Lymph Nodes Examined: 13     Pathologic Stage Classification (pTNM, AJCC 8th Edition)     TNM Descriptors (select all that apply)         r (recurrent)     Primary Tumor (pT):      pT3: Tumor invades through muscularis propria into pericolorectal   tissue     Regional Lymph Nodes (pN):      pN1a: One regional lymph node is positive     pM Category      pM1     + Additional pathologic findings: Not applicable.    + Ancillary studies: MSI pending     Imaging:  I have personally reviewed the following films:    XR ABDOMEN (KUB) (SINGLE AP VIEW)    Result Date: 12/12/2021  EXAMINATION: ONE SUPINE XRAY VIEW(S) OF THE ABDOMEN 12/12/2021 11:03 am COMPARISON: 12/05/2021 HISTORY: ORDERING SYSTEM PROVIDED HISTORY: post op TECHNOLOGIST PROVIDED HISTORY: Reason for exam:->post op Reason for Exam: post op FINDINGS: There are proved with mild residual dilated loops of small bowel within the right mid abdomen likely due to resolving and amic ileus. Suture material is present within the right upper quadrant. The nasogastric tube is been removed. Degenerative changes involve the lumbar spine and bilateral hips. 1. Improving adynamic ileus. Scheduled Meds:   metoclopramide  10 mg IntraVENous Q6H    piperacillin-tazobactam  3,375 mg IntraVENous Q8H    sodium chloride flush  5-40 mL IntraVENous 2 times per day    enoxaparin  40 mg SubCUTAneous Daily     Continuous Infusions:   dextrose 5% and 0.45% NaCl with KCl 20 mEq 100 mL/hr at 12/13/21 6892    dextrose      sodium chloride 25 mL (12/12/21 1657)     PRN Meds:.oxyCODONE-acetaminophen, calcium carbonate, potassium chloride **OR** potassium alternative oral replacement **OR** potassium chloride, glucose, dextrose, glucagon (rDNA), dextrose, HYDROmorphone, morphine, ketorolac, LORazepam, sodium chloride flush, sodium chloride, ondansetron **OR** ondansetron, polyethylene glycol, acetaminophen **OR** acetaminophen      Assessment:  57 y.o. male admitted with   1. SBO (small bowel obstruction) ([facility] [address]    2. Pneumonia due to infectious organism, unspecified laterality, unspecified part of lung    3. Severe sepsis (HCC)        Status-post exploratory laparotomy, ileocolonic resection of distal ileum and prior hepatic flexure region anastomosis, omental resection on 12/6/2021 for small bowel obstruction, history of colon cancer, intra-abdominal ascites, carcinomatosis and small bowel obstruction from recurrent cancer  Pneumonia  Colon cancer with liver and lung metastasis  Hypertension   Postoperative ileus      Plan:  1. Keep JORGE dressing in place  2.  Hold at full liquid diet as tolerated until nausea resolves; monitor bowel function--having diarrhea; repeat AXR today  3. IV hydration until PO intake is adequate; monitor and correct electrolytes  4. Antibiotics--on Zosyn  5. Activity as tolerated, ambulate TID, up to chair for meals  6. Pulmonary toilet, incentive spirometry  7. PRN analgesics and antiemetics--minimizing narcotics as tolerated  8. DVT prophylaxis with Lovenox and SCD's  9. Management of medical comorbid etiologies per primary team and consulting services  10. Disposition: Anticipate discharge home in the next 1-2 days from a surgical perspective    EDUCATION:  Educated patient on plan of care and disease process--all questions answered. Plans discussed with patient and nursing. Reviewed and discussed with Dr. Roxy Jorgensen.       Signed:  VIJAY Chan - CNP  12/13/2021 10:33 AM     Surgery Staff:     Pt seen and examined with NP  See full note above  Not able to eat much yet and appears bloated still  Will check AXR, cont Reglan, add Dulcolax today  Cont IV hydration  OK to stop Lori Fitzgerald MD

## 2021-12-13 NOTE — PROGRESS NOTES
[facility]    12/14/2021 10:34 AM        Name: Kiran Santana . Admitted: 12/5/2021  Primary Care Provider: Devante Bella MD (Tel: [phone])      Chief Complaint   Patient presents with    Abdominal Pain     Pt states he has had belly pain since last thursday, and also vomiting. Feels like he is constipated as well. Hx colon cancer     Brief History: Patient is a 56 yo male with hx metastatic colon cancer, colectomy, HTN. He was recently hospitalized (11/29-12/2) with bowel obstruction which was managed medically. He returned to hospital few days later with worsening abdominal pain, vomiting and constipation. CT abd/pelvis showed small bowel obstruction at ileocolic anastomosis and moderate to large ascites. 12/6/2021 PROCEDURE:  Exploratory laparotomy, ileocolonic resection of distal ileum and prior hepatic flexure region anastomosis, omental resection. Subjective:  Resting in bed working on laptop. Reports operative pain but improving. Notes mild, intermittent nausea, no emesis. Last BM was yesterday.  Denies chest pain, shortness of breath    Reviewed interval ancillary notes    Current Medications  furosemide (LASIX) injection 20 mg, Once  bisacodyl (DULCOLAX) suppository 10 mg, Once  pantoprazole (PROTONIX) injection 40 mg, Daily  metoclopramide (REGLAN) injection 10 mg, Q6H  oxyCODONE-acetaminophen (PERCOCET) 5-325 MG per tablet 1 tablet, Q6H PRN  calcium carbonate (TUMS) chewable tablet 500 mg, TID PRN  potassium chloride (KLOR-CON M) extended release tablet 40 mEq, PRN   Or  potassium bicarb-citric acid (EFFER-K) effervescent tablet 40 mEq, PRN   Or  potassium chloride 10 mEq/100 mL IVPB (Peripheral Line), PRN  dextrose 5 % and 0.45 % NaCl with KCl 20 mEq infusion, Continuous  glucose (GLUTOSE) 40 % oral gel 15 g, PRN  dextrose 50 % IV solution, PRN  glucagon (rDNA) injection 1 mg, PRN  dextrose 5 % solution, PRN  HYDROmorphone HCl PF (DILAUDID) injection 0.5 mg, Q3H PRN  morphine (PF) injection 2 mg, Q3H PRN  ketorolac (TORADOL) injection 15 mg, Q6H PRN  LORazepam (ATIVAN) injection 0.5 mg, Q6H PRN  sodium chloride flush 0.9 % injection 5-40 mL, 2 times per day  sodium chloride flush 0.9 % injection 5-40 mL, PRN  0.9 % sodium chloride infusion, PRN  enoxaparin (LOVENOX) injection 40 mg, Daily  ondansetron (ZOFRAN-ODT) disintegrating tablet 4 mg, Q8H PRN   Or  ondansetron (ZOFRAN) injection 4 mg, Q6H PRN  polyethylene glycol (GLYCOLAX) packet 17 g, Daily PRN  acetaminophen (TYLENOL) tablet 650 mg, Q6H PRN   Or  acetaminophen (TYLENOL) suppository 650 mg, Q6H PRN        Objective:  /76   Pulse 102   Temp 98.8 °F (37.1 °C) (Oral)   Resp 18   Ht 5' 11\" (1.803 m)   Wt 182 lb 1.6 oz (82.6 kg)   SpO2 96%   BMI 25.40 kg/m²   No intake or output data in the 24 hours ending 12/14/21 1034   Wt Readings from Last 3 Encounters:   12/11/21 182 lb 1.6 oz (82.6 kg)   12/01/21 169 lb 6.4 oz (76.8 kg)   10/13/21 175 lb 3.2 oz (79.5 kg)       General appearance:  Appears comfortable  Eyes: Sclera clear. Pupils equal.  ENT: Moist oral mucosa. Trachea midline, no adenopathy. Cardiovascular: Regular rhythm, normal S1, S2. No murmur. No edema in lower extremities  Respiratory: Not using accessory muscles. Good inspiratory effort. Clear to auscultation bilaterally, no wheeze or crackles. GI: Abdomen soft, appropriate operative tenderness, mild distention, + bowel sounds, midline incision C/D/I  Musculoskeletal: No cyanosis in digits, neck supple  Neurology: CN 2-12 grossly intact. No speech or motor deficits  Psych: Normal affect.  Alert and oriented in time, place and person  Skin: Warm, dry, normal turgor    Labs and Tests:  CBC:   Recent Labs     12/12/21  1043 12/13/21  0829 12/14/21  0655   WBC 11.3* 7.0 5.9   HGB 10.3* 10.0* 8.9*    185 176     BMP:    Recent Labs     12/12/21  1043 12/13/21  0829 12/14/21  0655   * 132* 132*   K 3.8 3.9 3.7    101 103   CO2 24 23 22   BUN 4* 3* 3*   CREATININE 0.6* 0.5* 0.5*   GLUCOSE 114* 112* 103*     Hepatic: No results for input(s): AST, ALT, ALB, BILITOT, ALKPHOS in the last 72 hours. CXR 12/5/2021:  New right parahilar airspace opacity suggesting pneumonia    CT Abdomen / Pelvis 12/5/2021:  1. Worsening small bowel obstruction with transition point at the ileocolic   anastomosis.  There is focal 2.2 cm area of wall thickening which may reflect   underlying stricture or mass.  No free air or pneumatosis. 2. Otherwise improved small bowel wall thickening. 3. Moderate to large volume ascites.  There is overall anasarca with   subcutaneous edema and pleural effusions as well. 4. New ground-glass attenuation and slightly increasing consolidative changes   within the lung bases suspicious for superimposed pneumonia.  Right lower   lobe mass is otherwise partially obscured due to atelectasis. 5. Hepatic steatosis.  No significant interval change in hepatic lesions. 6. Splenomegaly. Shelda Clock is a subtle area of low attenuation within the   lateral spleen, nonspecific and could be related to timing of the contrast   bolus; however, findings could also be related to developing splenic infarct. Xray Abdomen 12/12/2021:  1. Improving adynamic ileus. Xray Abdomen 12/13/2021:  Similar appearance demonstrating a mild ileus    Problem List  Active Problems:    Bowel obstruction (HCC)    Carcinomatosis (HCC)    Omental mass  Resolved Problems:    * No resolved hospital problems. *       Assessment & Plan:   1. Bowel obstruction. S/p exploratory lap with ileocolonic resection of distal ileum with prior hepatic flexure anastomosis with omental resection 12/6. Completed 7 day course Zosyn. Taking full liquid diet, mild nausea, no emesis. Abd xray 12/13 with mild ileus, to receive dulcolax suppository today.  Remains on IV fluids, rate decreased per surgery, to receive IV Lasix x 1. Surgery on board. 2. Abnormal CXR suggestive of pneumonia present on admission. Patient remains afebrile with normal WBC. He has completed 7 days of zosyn. He is walking in the halls and using IS  3. GERD. Continue PPI. 4. HTN. Controlled. Continue to monitor. 5. Anemia of chronic disease. Hgb 10.0-> 8.9, no signs active bleeding. Continue to follow. 6. Moderate malnutrition due to surgery / cancer. Dietary following and offering supplements. 7. Metastatic colon cancer with carcinomatosis and intra abdominal ascites, liver and lung mets,  He is followed by Dr Gloria Vides. Anticipates additional therapy when he heals from current surgery or possible clinical trial at [facility]. Disposition: Pt anticipates eventual d/c home to his condo. He has a room mate who with assist with his care if needed. He walks in the halls with no assistance. Diet: ADULT ORAL NUTRITION SUPPLEMENT; Breakfast, Lunch, Dinner, HS Snack; Clear Liquid Oral Supplement  ADULT DIET;  Full Liquid  Code:Full Code  DVT PPX: enoxaparin      VIJAY Rebolledo - NAS   12/14/2021 10:35 AM

## 2021-12-13 NOTE — PLAN OF CARE
Nutrition Problem #1: Inadequate oral intake  Intervention: Food and/or Nutrient Delivery: Continue Current Diet, Continue Oral Nutrition Supplement  Nutritional Goals: PO and supplement intake 50% or greater This document is complete and the patient is ready for discharge.

## 2021-12-13 NOTE — PROGRESS NOTES
Comprehensive Nutrition Assessment    Type and Reason for Visit:  Initial    Nutrition Recommendations/Plan:   Ensure Clear QID  Monitor PO/ONS intake    Nutrition Assessment:  Pt with hx of metastatic colon cancer, liver and lung metastasis. He is not undergoing chemo at this time. S/p Ileocolonic segmental and omental  bowel resection 12/6. Pt with increased nutrient needs d/t cancer and surgical wounds. Pt reports poor PO started in August and he feels it was d/t bowel obstruction. He was eating two meals per day and only 50% of his food. Pt states he weighed 220# three years ago. He has been maintaining his weight at 170 for 9 months. He reports not having an appetite in hospital. He is eating 25% of full liquid diet. He is drinking Ensure Clear QID. Offered Ensure [place] and he declined. He tolerates Ensure Clear very well and he likes it. Will continue to monitor PO and ONS intake throughout hospital stay. Malnutrition Assessment:  Malnutrition Status: Moderate malnutrition    Context:  Chronic Illness     Findings of the 6 clinical characteristics of malnutrition:  Energy Intake:  7 - 75% or less estimated energy requirements for 1 month or longer  Weight Loss:  7 - Greater than 20% over 1 year (30% weight loss over three years)     Body Fat Loss:  1 - Mild body fat loss     Muscle Mass Loss:  1 - Mild muscle mass loss    Fluid Accumulation:  No significant fluid accumulation     Strength:  Not Performed    Estimated Daily Nutrient Needs:  Energy (kcal):  3395-8192 (25-30kcal/82.6kg); Weight Used for Energy Requirements:  Current     Protein (g):   (1.2-1.8g/82.6kg);  Weight Used for Protein Requirements:  Current        Fluid (ml/day):   ; Method Used for Fluid Requirements:  1 ml/kcal      Nutrition Related Findings:  , Glucose 112, LBM 12/12, +1 pitting BLE      Wounds:  Surgical Incision       Current Nutrition Therapies:    ADULT ORAL NUTRITION SUPPLEMENT; Breakfast, Lunch, Dinner, HS Snack; Clear Liquid Oral Supplement  ADULT DIET; Full Liquid    Anthropometric Measures:  · Height: 5' 11\" (180.3 cm)  · Current Body Weight: 182 lb (82.6 kg)   · Ideal Body Weight: 172 lbs; % Ideal Body Weight 105.8 %   · BMI: 25.4  · BMI Categories: Overweight (BMI 25.0-29. 9)       Nutrition Diagnosis:   · Inadequate oral intake related to catabolic illness as evidenced by poor intake prior to admission, wounds, intake 0-25%      Nutrition Interventions:   Food and/or Nutrient Delivery:  Continue Current Diet, Continue Oral Nutrition Supplement  Nutrition Education/Counseling:  Education not indicated   Coordination of Nutrition Care:  Continue to monitor while inpatient    Goals:  PO and supplement intake 50% or greater       Nutrition Monitoring and Evaluation:   Behavioral-Environmental Outcomes:  None Identified   Food/Nutrient Intake Outcomes:  Food and Nutrient Intake, Supplement Intake  Physical Signs/Symptoms Outcomes:  Weight, GI Status, Biochemical Data     Discharge Planning:    Continue Oral Nutrition Supplement     Electronically signed by Gerard Hernandez RD, LD on 12/13/21 at 12:36 PM EST    Contact: [phone]

## 2021-12-14 LAB
ANION GAP SERPL CALCULATED.3IONS-SCNC: 7 MMOL/L (ref 3–16)
BUN BLDV-MCNC: 3 MG/DL (ref 7–20)
CALCIUM SERPL-MCNC: 7.6 MG/DL (ref 8.3–10.6)
CHLORIDE BLD-SCNC: 103 MMOL/L (ref 99–110)
CO2: 22 MMOL/L (ref 21–32)
CREAT SERPL-MCNC: 0.5 MG/DL (ref 0.8–1.3)
GFR AFRICAN AMERICAN: >60
GFR NON-AFRICAN AMERICAN: >60
GLUCOSE BLD-MCNC: 103 MG/DL (ref 70–99)
GLUCOSE BLD-MCNC: 131 MG/DL (ref 70–99)
HCT VFR BLD CALC: 26.6 % (ref 40.5–52.5)
HEMOGLOBIN: 8.9 G/DL (ref 13.5–17.5)
MCH RBC QN AUTO: 34.1 PG (ref 26–34)
MCHC RBC AUTO-ENTMCNC: 33.5 G/DL (ref 31–36)
MCV RBC AUTO: 102 FL (ref 80–100)
PDW BLD-RTO: 16.5 % (ref 12.4–15.4)
PERFORMED ON: ABNORMAL
PLATELET # BLD: 176 K/UL (ref 135–450)
PMV BLD AUTO: 7.7 FL (ref 5–10.5)
POTASSIUM SERPL-SCNC: 3.7 MMOL/L (ref 3.5–5.1)
RBC # BLD: 2.61 M/UL (ref 4.2–5.9)
SODIUM BLD-SCNC: 132 MMOL/L (ref 136–145)
WBC # BLD: 5.9 K/UL (ref 4–11)

## 2021-12-14 PROCEDURE — 6360000002 HC RX W HCPCS: Performed by: NURSE PRACTITIONER

## 2021-12-14 PROCEDURE — 6360000002 HC RX W HCPCS: Performed by: SURGERY

## 2021-12-14 PROCEDURE — APPNB30 APP NON BILLABLE TIME 0-30 MINS: Performed by: NURSE PRACTITIONER

## 2021-12-14 PROCEDURE — APPSS15 APP SPLIT SHARED TIME 0-15 MINUTES: Performed by: NURSE PRACTITIONER

## 2021-12-14 PROCEDURE — 1200000000 HC SEMI PRIVATE

## 2021-12-14 PROCEDURE — 80048 BASIC METABOLIC PNL TOTAL CA: CPT

## 2021-12-14 PROCEDURE — 99024 POSTOP FOLLOW-UP VISIT: CPT | Performed by: SURGERY

## 2021-12-14 PROCEDURE — 6370000000 HC RX 637 (ALT 250 FOR IP): Performed by: NURSE PRACTITIONER

## 2021-12-14 PROCEDURE — 85027 COMPLETE CBC AUTOMATED: CPT

## 2021-12-14 PROCEDURE — 2500000003 HC RX 250 WO HCPCS: Performed by: NURSE PRACTITIONER

## 2021-12-14 PROCEDURE — 36415 COLL VENOUS BLD VENIPUNCTURE: CPT

## 2021-12-14 PROCEDURE — C9113 INJ PANTOPRAZOLE SODIUM, VIA: HCPCS | Performed by: NURSE PRACTITIONER

## 2021-12-14 PROCEDURE — 6360000002 HC RX W HCPCS: Performed by: FAMILY MEDICINE

## 2021-12-14 RX ORDER — BISACODYL 10 MG
10 SUPPOSITORY, RECTAL RECTAL ONCE
Status: COMPLETED | OUTPATIENT
Start: 2021-12-14 | End: 2021-12-14

## 2021-12-14 RX ORDER — FUROSEMIDE 10 MG/ML
20 INJECTION INTRAMUSCULAR; INTRAVENOUS ONCE
Status: COMPLETED | OUTPATIENT
Start: 2021-12-14 | End: 2021-12-14

## 2021-12-14 RX ADMIN — FUROSEMIDE 20 MG: 10 INJECTION, SOLUTION INTRAMUSCULAR; INTRAVENOUS at 11:51

## 2021-12-14 RX ADMIN — BISACODYL 10 MG: 10 SUPPOSITORY RECTAL at 11:52

## 2021-12-14 RX ADMIN — OXYCODONE AND ACETAMINOPHEN 1 TABLET: 5; 325 TABLET ORAL at 20:57

## 2021-12-14 RX ADMIN — METOCLOPRAMIDE 10 MG: 5 INJECTION, SOLUTION INTRAMUSCULAR; INTRAVENOUS at 09:07

## 2021-12-14 RX ADMIN — HYDROMORPHONE HYDROCHLORIDE 0.5 MG: 1 INJECTION, SOLUTION INTRAMUSCULAR; INTRAVENOUS; SUBCUTANEOUS at 01:20

## 2021-12-14 RX ADMIN — OXYCODONE AND ACETAMINOPHEN 1 TABLET: 5; 325 TABLET ORAL at 12:09

## 2021-12-14 RX ADMIN — ENOXAPARIN SODIUM 40 MG: 100 INJECTION SUBCUTANEOUS at 09:07

## 2021-12-14 RX ADMIN — HYDROMORPHONE HYDROCHLORIDE 0.5 MG: 1 INJECTION, SOLUTION INTRAMUSCULAR; INTRAVENOUS; SUBCUTANEOUS at 07:05

## 2021-12-14 RX ADMIN — PANTOPRAZOLE SODIUM 40 MG: 40 INJECTION, POWDER, FOR SOLUTION INTRAVENOUS at 09:08

## 2021-12-14 RX ADMIN — HYDROMORPHONE HYDROCHLORIDE 0.5 MG: 1 INJECTION, SOLUTION INTRAMUSCULAR; INTRAVENOUS; SUBCUTANEOUS at 04:14

## 2021-12-14 RX ADMIN — METOCLOPRAMIDE 10 MG: 5 INJECTION, SOLUTION INTRAMUSCULAR; INTRAVENOUS at 14:29

## 2021-12-14 RX ADMIN — METOCLOPRAMIDE 10 MG: 5 INJECTION, SOLUTION INTRAMUSCULAR; INTRAVENOUS at 20:58

## 2021-12-14 RX ADMIN — POTASSIUM CHLORIDE, DEXTROSE MONOHYDRATE AND SODIUM CHLORIDE: 150; 5; 450 INJECTION, SOLUTION INTRAVENOUS at 04:17

## 2021-12-14 RX ADMIN — BISACODYL 10 MG: 10 SUPPOSITORY RECTAL at 09:19

## 2021-12-14 RX ADMIN — HYDROMORPHONE HYDROCHLORIDE 0.5 MG: 1 INJECTION, SOLUTION INTRAMUSCULAR; INTRAVENOUS; SUBCUTANEOUS at 16:43

## 2021-12-14 RX ADMIN — METOCLOPRAMIDE 10 MG: 5 INJECTION, SOLUTION INTRAMUSCULAR; INTRAVENOUS at 01:21

## 2021-12-14 RX ADMIN — HYDROMORPHONE HYDROCHLORIDE 0.5 MG: 1 INJECTION, SOLUTION INTRAMUSCULAR; INTRAVENOUS; SUBCUTANEOUS at 22:12

## 2021-12-14 ASSESSMENT — PAIN SCALES - GENERAL
PAINLEVEL_OUTOF10: 6
PAINLEVEL_OUTOF10: 2
PAINLEVEL_OUTOF10: 6
PAINLEVEL_OUTOF10: 7
PAINLEVEL_OUTOF10: 5
PAINLEVEL_OUTOF10: 5
PAINLEVEL_OUTOF10: 3
PAINLEVEL_OUTOF10: 7
PAINLEVEL_OUTOF10: 5
PAINLEVEL_OUTOF10: 7
PAINLEVEL_OUTOF10: 0
PAINLEVEL_OUTOF10: 7

## 2021-12-14 ASSESSMENT — PAIN DESCRIPTION - PAIN TYPE
TYPE: SURGICAL PAIN

## 2021-12-14 ASSESSMENT — PAIN DESCRIPTION - PROGRESSION: CLINICAL_PROGRESSION: GRADUALLY IMPROVING

## 2021-12-14 ASSESSMENT — PAIN DESCRIPTION - DESCRIPTORS: DESCRIPTORS: ACHING

## 2021-12-14 ASSESSMENT — PAIN DESCRIPTION - ONSET: ONSET: ON-GOING

## 2021-12-14 ASSESSMENT — PAIN DESCRIPTION - LOCATION: LOCATION: ABDOMEN

## 2021-12-14 ASSESSMENT — PAIN DESCRIPTION - FREQUENCY: FREQUENCY: CONTINUOUS

## 2021-12-14 ASSESSMENT — PAIN DESCRIPTION - ORIENTATION: ORIENTATION: MID

## 2021-12-14 NOTE — CARE COORDINATION
Discharge Planning Note:     Chart reviewed and it appears that patient has minimal needs for discharge at this time. Discussed with patients nurse and requested that case management be notified if discharge needs are identified. Case management will continue to follow progress and update discharge plan as needed.       KATINA York RN  Case Manger    Phone: [phone]

## 2021-12-14 NOTE — PLAN OF CARE
Problem: Nausea/Vomiting:  Goal: Absence of nausea/vomiting  Description: Absence of nausea/vomiting  12/14/2021 0238 by Osman Lewis RN  Outcome: Ongoing  12/13/2021 1926 by Stephen Hernandez RN  Outcome: Ongoing  Goal: Able to drink  Description: Able to drink  12/14/2021 0238 by Osman Lewis RN  Outcome: Ongoing  12/13/2021 1926 by Stephen Hernandez RN  Outcome: Ongoing  Goal: Able to eat  Description: Able to eat  12/14/2021 0238 by Osman Lewis RN  Outcome: Ongoing  12/13/2021 1926 by Stephen Hernandez RN  Outcome: Ongoing  Goal: Ability to achieve adequate nutritional intake will improve  Description: Ability to achieve adequate nutritional intake will improve  12/14/2021 0238 by Osman Lewis RN  Outcome: Ongoing  12/13/2021 1926 by Stephen Hernandez RN  Outcome: Ongoing     Problem: Pain:  Goal: Pain level will decrease  Description: Pain level will decrease  12/14/2021 0238 by Osman Lewis RN  Outcome: Ongoing  12/13/2021 1926 by Stephen Hernandez RN  Outcome: Ongoing  Goal: Control of acute pain  Description: Control of acute pain  12/13/2021 1926 by Stephen Hernandez RN  Outcome: Ongoing  Goal: Control of chronic pain  Description: Control of chronic pain  12/13/2021 1926 by Stephen Hernandez RN  Outcome: Ongoing

## 2021-12-14 NOTE — PLAN OF CARE
Problem: Nausea/Vomiting:  Goal: Absence of nausea/vomiting  Description: Absence of nausea/vomiting  12/14/2021 1108 by Yuridia Chan RN  Outcome: Ongoing     Problem: Nausea/Vomiting:  Goal: Able to drink  Description: Able to drink  12/14/2021 1108 by Yuridia Chan RN  Outcome: Ongoing     Problem: Nausea/Vomiting:  Goal: Able to eat  Description: Able to eat  12/14/2021 1108 by Yuridia Chan RN  Outcome: Ongoing     Problem: Nausea/Vomiting:  Goal: Ability to achieve adequate nutritional intake will improve  Description: Ability to achieve adequate nutritional intake will improve  12/14/2021 1108 by Yuridia Chan RN  Outcome: Ongoing     Problem: Pain:  Goal: Pain level will decrease  Description: Pain level will decrease  12/14/2021 1108 by Yuridia Chan RN  Outcome: Ongoing     Problem: Pain:  Goal: Control of acute pain  Description: Control of acute pain  Outcome: Ongoing     Problem: Pain:  Goal: Control of chronic pain  Description: Control of chronic pain  Outcome: Ongoing     Problem: Falls - Risk of:  Goal: Will remain free from falls  Description: Will remain free from falls  12/14/2021 1108 by Yuridia Chan RN  Outcome: Ongoing     Problem: Falls - Risk of:  Goal: Absence of physical injury  Description: Absence of physical injury  12/14/2021 1108 by Yuridia Chan RN  Outcome: Ongoing     Problem: Nutrition  Goal: Optimal nutrition therapy  12/14/2021 1108 by Yuridia Chan RN  Outcome: Ongoing

## 2021-12-14 NOTE — PROGRESS NOTES
[facility]        Progress Note    Patient Name: Arden Tong  MRN: [id number]  YOB: 1960  Date of Evaluation: 2021    Subjective:  No acute events overnight, overall feeling much better today  Pain controlled  No further nausea or vomiting; tolerating full liquids  Passing flatus, last BM yesterday, feels less bloated   Resting in bed at this time, reports ambulating without difficulty    Post-Operative Day #8      Vital Signs:  Patient Vitals for the past 24 hrs:   BP Temp Temp src Pulse Resp SpO2 Height   21 0745 110/76 98.8 °F (37.1 °C) Oral 102 18 96 %    21 0331 102/71 98.2 °F (36.8 °C) Oral 98 18 97 %    21 2114 101/68 98.5 °F (36.9 °C) Oral 98 20 97 %    21 1515 107/74 97.6 °F (36.4 °C) Oral 96 16     21 1200 102/70 97.8 °F (36.6 °C) Oral 95 16     21 1156       5' 11\" (1.803 m)      TEMPERATURE HISTORY 24H: Temp (24hrs), Av.2 °F (36.8 °C), Min:97.6 °F (36.4 °C), Max:98.8 °F (37.1 °C)    BLOOD PRESSURE HISTORY: Systolic (36ETW), QMM:455 , Min:100 , NZD:504    Diastolic (60KNO), QXE:29, Min:68, Max:76      Intake/Output:  No intake/output data recorded. No intake/output data recorded.   Drain/tube Output:       Physical Exam:  General: awake, alert, oriented to person, place, time  Lungs: unlabored respirations  Abdomen: soft, less distended, incisional tenderness only, bowel sounds active  Skin/Wound: healing well, no drainage, no erythema, well approximated with staples--JORGE removed, new dry gauze dressing applied    Labs:  CBC:    Recent Labs     21  1043 21  0829 21  0655   WBC 11.3* 7.0 5.9   HGB 10.3* 10.0* 8.9*   HCT 31.0* 29.7* 26.6*    185 176     BMP:    Recent Labs     21  1043 21  0829 21  0655   * 132* 132*   K 3.8 3.9 3.7    101 103   CO2 24 23 22   BUN 4* 3* 3*   CREATININE 0.6* 0.5* 0.5*   GLUCOSE 114* 112* 103* Hepatic:    No results for input(s): AST, ALT, ALB, BILITOT, ALKPHOS in the last 72 hours. Amylase:  No results found for: AMYLASE  Lipase:    Lab Results   Component Value Date    LIPASE 13.0 12/05/2021    LIPASE 10.0 11/29/2021      Mag:    Lab Results   Component Value Date    MG 2.20 12/02/2021    MG 1.70 12/02/2021     Phos:     Lab Results   Component Value Date    PHOS 2.7 12/01/2021    PHOS 3.4 10/14/2021      Coags:   Lab Results   Component Value Date    PROTIME 15.8 12/05/2021    INR 1.38 12/05/2021    APTT 40.9 12/01/2021       Cultures:  Anaerobic culture  No results found for: LABANAE  Fungus stain  No results found for requested labs within last 30 days. Gram stain  No results found for requested labs within last 30 days. Organism  No results found for: [facility]  Surgical culture  No results found for: CXSURG  Blood culture 1  Results in Past 30 Days  Result Component Current Result Ref Range Previous Result Ref Range   Blood Culture, Routine No Growth after 4 days of incubation. (12/5/2021)  Not in Time Range      Blood culture 2  Results in Past 30 Days  Result Component Current Result Ref Range Previous Result Ref Range   Culture, Blood 2 No Growth after 4 days of incubation. (12/5/2021)  Not in Time Range      Fecal occult  No results found for requested labs within last 30 days. GI bacterial pathogens by PCR  Results in Past 30 Days  Result Component Current Result Ref Range Previous Result Ref Range   GI Bacterial Pathogens By PCR No Shigella spp/EIEC DNA detected  No Shiga toxin-producing gene(s) detected  No Campylobacter spp. (jejuni and coli)DNA detected  No Salmonella spp. DNA detected  Normal Range:  None detected   (11/30/2021)  Not in Time Range      C. difficile  No results found for requested labs within last 30 days.      Urine culture  Lab Results   Component Value Date    LABURIN No growth at 18-36 hours 04/12/2018       Pathology:  OR 12/6/2021--FINAL DIAGNOSIS: Ileocolonic segmental resection and omental resection:   - Invasive colonic adenocarcinoma, moderately differentiated. - Margins not involved. - One pericolonic lymph nodes positive for metastatic carcinoma (1/13). - Omentum involved by adenocarcinoma. Procedure: Segmental resection of ileocolonic anastomosis   Tumor Site: ileocolonic anastomosis site   Tumor Size: Greatest dimension (centimeter): 3.7        Additional dimensions (centimeters): 2.2 x 0.5   Macroscopic tumor perforation: Not identified   Histologic Type: Adenocarcinoma   Histologic Grade: G2 moderate differentiated   Tumor Extension: Tumor invades through muscularis propria into subserosal   adipose tissue. MARGINS   All margins are uninvolved by invasive carcinoma, high grade dysplasia /   intramucosal carcinoma, and low grade dysplasia      Margins examined: Proximal, distal, and radial    + Distance of invasive carcinoma from closest margin (millimeters or   centimeters): 30 mm    + Specify closest margin: Radial     Treatment Effect (applicable to carcinomas treated with neoadjuvant   therapy): No known pre surgical treatment   Lymph-Vascular Invasion: Not identified   Perineural Invasion: Not identified   + Tumor Budding (Note I)         Low score (0-4)   Type of Polyp in Which Invasive Carcinoma Arose: Not applicable   Tumor deposits: Present     Regional lymph nodes     Number of Lymph nodes Involved: 1     Number of Lymph Nodes Examined: 13     Pathologic Stage Classification (pTNM, AJCC 8th Edition)     TNM Descriptors (select all that apply)         r (recurrent)     Primary Tumor (pT):      pT3: Tumor invades through muscularis propria into pericolorectal   tissue     Regional Lymph Nodes (pN):      pN1a: One regional lymph node is positive     pM Category      pM1     + Additional pathologic findings: Not applicable.    + Ancillary studies: MSI pending     Imaging:  I have personally reviewed the following films:    XR ABDOMEN (KUB) (SINGLE AP VIEW)    Result Date: 12/12/2021  EXAMINATION: ONE SUPINE XRAY VIEW(S) OF THE ABDOMEN 12/12/2021 11:03 am COMPARISON: 12/05/2021 HISTORY: ORDERING SYSTEM PROVIDED HISTORY: post op TECHNOLOGIST PROVIDED HISTORY: Reason for exam:->post op Reason for Exam: post op FINDINGS: There are proved with mild residual dilated loops of small bowel within the right mid abdomen likely due to resolving and amic ileus. Suture material is present within the right upper quadrant. The nasogastric tube is been removed. Degenerative changes involve the lumbar spine and bilateral hips. 1. Improving adynamic ileus. XR ABDOMEN (2 VIEWS)    Result Date: 12/13/2021  EXAMINATION: TWO XRAY VIEWS OF THE ABDOMEN 12/13/2021 1:33 pm COMPARISON: 12/12/2021 HISTORY: ORDERING SYSTEM PROVIDED HISTORY: Abdominal distention TECHNOLOGIST PROVIDED HISTORY: Reason for exam:->Abdominal distention Reason for Exam: ABDOMINAL DISTENTION. FINDINGS: Gas seen in nondilated bowel loops. There is ribs it'll small bowel gas. No extraluminal gas.      Similar appearance demonstrating a mild ileus     Scheduled Meds:   furosemide  20 mg IntraVENous Once    bisacodyl  10 mg Rectal Once    pantoprazole  40 mg IntraVENous Daily    metoclopramide  10 mg IntraVENous Q6H    sodium chloride flush  5-40 mL IntraVENous 2 times per day    enoxaparin  40 mg SubCUTAneous Daily     Continuous Infusions:   dextrose 5% and 0.45% NaCl with KCl 20 mEq 40 mL/hr at 12/14/21 8703    dextrose      sodium chloride 25 mL (12/12/21 1657)     PRN Meds:.oxyCODONE-acetaminophen, calcium carbonate, potassium chloride **OR** potassium alternative oral replacement **OR** potassium chloride, glucose, dextrose, glucagon (rDNA), dextrose, HYDROmorphone, morphine, ketorolac, LORazepam, sodium chloride flush, sodium chloride, ondansetron **OR** ondansetron, polyethylene glycol, acetaminophen **OR** acetaminophen      Assessment:  57 y.o. male admitted with 1. SBO (small bowel obstruction) ([facility] [address]    2. Pneumonia due to infectious organism, unspecified laterality, unspecified part of lung    3. Severe sepsis (HCC)        Status-post exploratory laparotomy, ileocolonic resection of distal ileum and prior hepatic flexure region anastomosis, omental resection on 12/6/2021 for small bowel obstruction, history of colon cancer, intra-abdominal ascites, carcinomatosis and small bowel obstruction from recurrent cancer  Pneumonia  Colon cancer with liver and lung metastasis  Hypertension   Postoperative ileus      Plan:  1. JORGE dressing removed, local care with dry dressing  2. Advance to low fiber diet as tolerated; monitor bowel function--last BM yesterday, Dulcolax suppository today x1  3. IV hydration, decrease rate, give 20 Lasix IV; monitor and correct electrolytes  4. Activity as tolerated, ambulate TID, up to chair for meals  5. Pulmonary toilet, incentive spirometry  6. PRN analgesics and antiemetics--minimizing narcotics as tolerated, transition to PO  7. DVT prophylaxis with Lovenox and SCD's + RIZWAN hose, elevate legs while in bed  8. Management of medical comorbid etiologies per primary team and consulting services  9. Disposition: Anticipate discharge home in the next 1-2 days from a surgical perspective    EDUCATION:  Educated patient on plan of care and disease process--all questions answered. Plans discussed with patient and nursing. Reviewed and discussed with Dr. Fouzia Mas.       Signed:  Candance Montgomery, APRN - CNP  12/14/2021 9:26 AM       Surg Staff:     Pt continues slow but steady progress  Doing well, less dist, debby po liquids  Will adv diet today  Decrease IVF, and give Lasix, needs volume off      Falguni Lee MD

## 2021-12-15 LAB
ANION GAP SERPL CALCULATED.3IONS-SCNC: 6 MMOL/L (ref 3–16)
BUN BLDV-MCNC: 3 MG/DL (ref 7–20)
CALCIUM SERPL-MCNC: 7.7 MG/DL (ref 8.3–10.6)
CHLORIDE BLD-SCNC: 105 MMOL/L (ref 99–110)
CO2: 23 MMOL/L (ref 21–32)
CREAT SERPL-MCNC: 0.6 MG/DL (ref 0.8–1.3)
GFR AFRICAN AMERICAN: >60
GFR NON-AFRICAN AMERICAN: >60
GLUCOSE BLD-MCNC: 113 MG/DL (ref 70–99)
GLUCOSE BLD-MCNC: 127 MG/DL (ref 70–99)
GLUCOSE BLD-MCNC: 128 MG/DL (ref 70–99)
GLUCOSE BLD-MCNC: 98 MG/DL (ref 70–99)
HCT VFR BLD CALC: 27.7 % (ref 40.5–52.5)
HEMOGLOBIN: 9.3 G/DL (ref 13.5–17.5)
MCH RBC QN AUTO: 33.8 PG (ref 26–34)
MCHC RBC AUTO-ENTMCNC: 33.5 G/DL (ref 31–36)
MCV RBC AUTO: 100.7 FL (ref 80–100)
PDW BLD-RTO: 16.6 % (ref 12.4–15.4)
PERFORMED ON: ABNORMAL
PERFORMED ON: ABNORMAL
PERFORMED ON: NORMAL
PLATELET # BLD: 190 K/UL (ref 135–450)
PMV BLD AUTO: 7.5 FL (ref 5–10.5)
POTASSIUM SERPL-SCNC: 4.4 MMOL/L (ref 3.5–5.1)
RBC # BLD: 2.75 M/UL (ref 4.2–5.9)
SODIUM BLD-SCNC: 134 MMOL/L (ref 136–145)
WBC # BLD: 7.3 K/UL (ref 4–11)

## 2021-12-15 PROCEDURE — 2580000003 HC RX 258: Performed by: FAMILY MEDICINE

## 2021-12-15 PROCEDURE — 6360000002 HC RX W HCPCS: Performed by: NURSE PRACTITIONER

## 2021-12-15 PROCEDURE — 80048 BASIC METABOLIC PNL TOTAL CA: CPT

## 2021-12-15 PROCEDURE — APPNB30 APP NON BILLABLE TIME 0-30 MINS: Performed by: NURSE PRACTITIONER

## 2021-12-15 PROCEDURE — 6370000000 HC RX 637 (ALT 250 FOR IP): Performed by: NURSE PRACTITIONER

## 2021-12-15 PROCEDURE — C9113 INJ PANTOPRAZOLE SODIUM, VIA: HCPCS | Performed by: NURSE PRACTITIONER

## 2021-12-15 PROCEDURE — 99024 POSTOP FOLLOW-UP VISIT: CPT | Performed by: SURGERY

## 2021-12-15 PROCEDURE — APPSS15 APP SPLIT SHARED TIME 0-15 MINUTES: Performed by: NURSE PRACTITIONER

## 2021-12-15 PROCEDURE — 1200000000 HC SEMI PRIVATE

## 2021-12-15 PROCEDURE — 6360000002 HC RX W HCPCS: Performed by: SURGERY

## 2021-12-15 PROCEDURE — 85027 COMPLETE CBC AUTOMATED: CPT

## 2021-12-15 PROCEDURE — 6370000000 HC RX 637 (ALT 250 FOR IP): Performed by: SURGERY

## 2021-12-15 PROCEDURE — 6360000002 HC RX W HCPCS: Performed by: FAMILY MEDICINE

## 2021-12-15 RX ORDER — METOCLOPRAMIDE HYDROCHLORIDE 5 MG/ML
10 INJECTION INTRAMUSCULAR; INTRAVENOUS EVERY 6 HOURS
Status: DISPENSED | OUTPATIENT
Start: 2021-12-15 | End: 2021-12-17

## 2021-12-15 RX ORDER — KETOROLAC TROMETHAMINE 15 MG/ML
15 INJECTION, SOLUTION INTRAMUSCULAR; INTRAVENOUS EVERY 6 HOURS PRN
Status: DISCONTINUED | OUTPATIENT
Start: 2021-12-15 | End: 2021-12-17 | Stop reason: HOSPADM

## 2021-12-15 RX ORDER — MORPHINE SULFATE 4 MG/ML
4 INJECTION, SOLUTION INTRAMUSCULAR; INTRAVENOUS
Status: DISCONTINUED | OUTPATIENT
Start: 2021-12-15 | End: 2021-12-15

## 2021-12-15 RX ORDER — ACETAMINOPHEN 500 MG
1000 TABLET ORAL EVERY 8 HOURS SCHEDULED
Status: DISCONTINUED | OUTPATIENT
Start: 2021-12-15 | End: 2021-12-16

## 2021-12-15 RX ORDER — OXYCODONE HYDROCHLORIDE 5 MG/1
5 TABLET ORAL EVERY 4 HOURS PRN
Status: DISCONTINUED | OUTPATIENT
Start: 2021-12-15 | End: 2021-12-16

## 2021-12-15 RX ORDER — HYDROMORPHONE HYDROCHLORIDE 1 MG/ML
0.5 INJECTION, SOLUTION INTRAMUSCULAR; INTRAVENOUS; SUBCUTANEOUS EVERY 4 HOURS PRN
Status: DISCONTINUED | OUTPATIENT
Start: 2021-12-15 | End: 2021-12-17 | Stop reason: HOSPADM

## 2021-12-15 RX ORDER — MORPHINE SULFATE 2 MG/ML
2 INJECTION, SOLUTION INTRAMUSCULAR; INTRAVENOUS
Status: DISCONTINUED | OUTPATIENT
Start: 2021-12-15 | End: 2021-12-15

## 2021-12-15 RX ADMIN — METOCLOPRAMIDE 10 MG: 5 INJECTION, SOLUTION INTRAMUSCULAR; INTRAVENOUS at 18:00

## 2021-12-15 RX ADMIN — METOCLOPRAMIDE 10 MG: 5 INJECTION, SOLUTION INTRAMUSCULAR; INTRAVENOUS at 22:00

## 2021-12-15 RX ADMIN — HYDROMORPHONE HYDROCHLORIDE 0.5 MG: 1 INJECTION, SOLUTION INTRAMUSCULAR; INTRAVENOUS; SUBCUTANEOUS at 01:21

## 2021-12-15 RX ADMIN — HYDROMORPHONE HYDROCHLORIDE 0.5 MG: 1 INJECTION, SOLUTION INTRAMUSCULAR; INTRAVENOUS; SUBCUTANEOUS at 08:09

## 2021-12-15 RX ADMIN — HYDROMORPHONE HYDROCHLORIDE 0.5 MG: 1 INJECTION, SOLUTION INTRAMUSCULAR; INTRAVENOUS; SUBCUTANEOUS at 21:57

## 2021-12-15 RX ADMIN — METOCLOPRAMIDE 10 MG: 5 INJECTION, SOLUTION INTRAMUSCULAR; INTRAVENOUS at 08:09

## 2021-12-15 RX ADMIN — Medication 10 ML: at 08:09

## 2021-12-15 RX ADMIN — PANTOPRAZOLE SODIUM 40 MG: 40 INJECTION, POWDER, FOR SOLUTION INTRAVENOUS at 08:09

## 2021-12-15 RX ADMIN — METOCLOPRAMIDE 10 MG: 5 INJECTION, SOLUTION INTRAMUSCULAR; INTRAVENOUS at 12:00

## 2021-12-15 RX ADMIN — HYDROMORPHONE HYDROCHLORIDE 0.5 MG: 1 INJECTION, SOLUTION INTRAMUSCULAR; INTRAVENOUS; SUBCUTANEOUS at 04:26

## 2021-12-15 RX ADMIN — ACETAMINOPHEN 1000 MG: 500 TABLET ORAL at 15:06

## 2021-12-15 RX ADMIN — OXYCODONE 5 MG: 5 TABLET ORAL at 15:05

## 2021-12-15 RX ADMIN — METOCLOPRAMIDE 10 MG: 5 INJECTION, SOLUTION INTRAMUSCULAR; INTRAVENOUS at 01:21

## 2021-12-15 RX ADMIN — ENOXAPARIN SODIUM 40 MG: 100 INJECTION SUBCUTANEOUS at 08:09

## 2021-12-15 RX ADMIN — HYDROMORPHONE HYDROCHLORIDE 0.5 MG: 1 INJECTION, SOLUTION INTRAMUSCULAR; INTRAVENOUS; SUBCUTANEOUS at 11:09

## 2021-12-15 RX ADMIN — OXYCODONE 5 MG: 5 TABLET ORAL at 19:38

## 2021-12-15 ASSESSMENT — PAIN - FUNCTIONAL ASSESSMENT: PAIN_FUNCTIONAL_ASSESSMENT: PREVENTS OR INTERFERES WITH ALL ACTIVE AND SOME PASSIVE ACTIVITIES

## 2021-12-15 ASSESSMENT — PAIN SCALES - GENERAL
PAINLEVEL_OUTOF10: 7
PAINLEVEL_OUTOF10: 6
PAINLEVEL_OUTOF10: 2
PAINLEVEL_OUTOF10: 7
PAINLEVEL_OUTOF10: 6
PAINLEVEL_OUTOF10: 2
PAINLEVEL_OUTOF10: 7
PAINLEVEL_OUTOF10: 6
PAINLEVEL_OUTOF10: 6

## 2021-12-15 ASSESSMENT — PAIN DESCRIPTION - PAIN TYPE
TYPE: SURGICAL PAIN
TYPE: SURGICAL PAIN

## 2021-12-15 ASSESSMENT — PAIN DESCRIPTION - ONSET: ONSET: ON-GOING

## 2021-12-15 ASSESSMENT — PAIN DESCRIPTION - ORIENTATION
ORIENTATION: MID
ORIENTATION: MID

## 2021-12-15 ASSESSMENT — PAIN DESCRIPTION - LOCATION
LOCATION: ABDOMEN
LOCATION: ABDOMEN

## 2021-12-15 ASSESSMENT — PAIN DESCRIPTION - FREQUENCY: FREQUENCY: CONTINUOUS

## 2021-12-15 ASSESSMENT — PAIN DESCRIPTION - DESCRIPTORS: DESCRIPTORS: ACHING

## 2021-12-15 ASSESSMENT — PAIN DESCRIPTION - PROGRESSION: CLINICAL_PROGRESSION: NOT CHANGED

## 2021-12-15 NOTE — PLAN OF CARE
Problem: Nausea/Vomiting:  Goal: Absence of nausea/vomiting  Description: Absence of nausea/vomiting  12/15/2021 0055 by Vee Young RN  Outcome: Ongoing  12/14/2021 1108 by Osman Lee RN  Outcome: Ongoing  Goal: Able to drink  Description: Able to drink  12/15/2021 0055 by Vee Young RN  Outcome: Ongoing  12/14/2021 1108 by Osman Lee RN  Outcome: Ongoing  Goal: Able to eat  Description: Able to eat  12/15/2021 0055 by Vee Young RN  Outcome: Ongoing  12/14/2021 1108 by Osman Lee RN  Outcome: Ongoing  Goal: Ability to achieve adequate nutritional intake will improve  Description: Ability to achieve adequate nutritional intake will improve  12/15/2021 0055 by Vee Young RN  Outcome: Ongoing  12/14/2021 1108 by Osman Lee RN  Outcome: Ongoing     Problem: Pain:  Goal: Pain level will decrease  Description: Pain level will decrease  12/15/2021 0055 by Vee Young RN  Outcome: Ongoing  12/14/2021 1108 by Osman Lee RN  Outcome: Ongoing  Goal: Control of acute pain  Description: Control of acute pain  12/15/2021 0055 by Vee Young RN  Outcome: Ongoing  12/14/2021 1108 by Osman Lee RN  Outcome: Ongoing  Goal: Control of chronic pain  Description: Control of chronic pain  12/15/2021 0055 by Vee Young RN  Outcome: Ongoing  12/14/2021 1108 by Osman Lee RN  Outcome: Ongoing     Problem: Falls - Risk of:  Goal: Will remain free from falls  Description: Will remain free from falls  12/15/2021 0055 by Vee Young RN  Outcome: Ongoing  12/14/2021 1108 by Osman Lee RN  Outcome: Ongoing  Goal: Absence of physical injury  Description: Absence of physical injury  12/15/2021 0055 by Vee Young RN  Outcome: Ongoing  12/14/2021 1108 by Osman Lee RN  Outcome: Ongoing     Problem: Nutrition  Goal: Optimal nutrition therapy  12/15/2021 0055 by Vee Young RN  Outcome: Ongoing  12/14/2021 1108 by Elvira Yung Phani RN  Outcome: Ongoing

## 2021-12-15 NOTE — PLAN OF CARE
Problem: Nausea/Vomiting:  Goal: Absence of nausea/vomiting  Description: Absence of nausea/vomiting  12/15/2021 0859 by Coni Smith RN  Outcome: Ongoing  12/15/2021 0055 by Taj Olvera RN  Outcome: Ongoing  Goal: Able to drink  Description: Able to drink  12/15/2021 0859 by Coni Smith RN  Outcome: Ongoing  12/15/2021 0055 by Taj Olvera RN  Outcome: Ongoing  Goal: Able to eat  Description: Able to eat  12/15/2021 0859 by Coni Smith RN  Outcome: Ongoing  12/15/2021 0055 by Taj Olvera RN  Outcome: Ongoing  Goal: Ability to achieve adequate nutritional intake will improve  Description: Ability to achieve adequate nutritional intake will improve  12/15/2021 0859 by Coni Smith RN  Outcome: Ongoing  12/15/2021 0055 by Taj Olvera RN  Outcome: Ongoing     Problem: Pain:  Goal: Pain level will decrease  Description: Pain level will decrease  12/15/2021 0859 by Coni Smith RN  Outcome: Ongoing  12/15/2021 0055 by Taj Olvera RN  Outcome: Ongoing  Goal: Control of acute pain  Description: Control of acute pain  12/15/2021 0859 by Coni Smith RN  Outcome: Ongoing  12/15/2021 0055 by Taj Olvera RN  Outcome: Ongoing  Goal: Control of chronic pain  Description: Control of chronic pain  12/15/2021 0859 by Coni Smith RN  Outcome: Ongoing  12/15/2021 0055 by Taj Olvera RN  Outcome: Ongoing     Problem: Falls - Risk of:  Goal: Will remain free from falls  Description: Will remain free from falls  12/15/2021 0859 by Coni Smith RN  Outcome: Ongoing  12/15/2021 0055 by Taj Olvera RN  Outcome: Ongoing  Goal: Absence of physical injury  Description: Absence of physical injury  12/15/2021 0859 by Coni Smith RN  Outcome: Ongoing  12/15/2021 0055 by Taj Olvera RN  Outcome: Ongoing     Problem: Nutrition  Goal: Optimal nutrition therapy  12/15/2021 0859 by Coni Smith RN  Outcome: Ongoing  12/15/2021 0055 by Taj Olvera RN  Outcome: Ongoing

## 2021-12-15 NOTE — PROGRESS NOTES
[facility]    12/15/2021 11:57 AM        Name: Jez Garrison . Admitted: 12/5/2021  Primary Care Provider: Kennedy Kumar MD (Tel: [phone])      Chief Complaint   Patient presents with    Abdominal Pain     Pt states he has had belly pain since last thursday, and also vomiting. Feels like he is constipated as well. Hx colon cancer     Brief History: Patient is a 56 yo male with hx metastatic colon cancer, colectomy, HTN. He was recently hospitalized (11/29-12/2) with bowel obstruction which was managed medically. He returned to hospital few days later with worsening abdominal pain, vomiting and constipation. CT abd/pelvis showed small bowel obstruction at ileocolic anastomosis and moderate to large ascites. 12/6/2021 PROCEDURE:  Exploratory laparotomy, ileocolonic resection of distal ileum and prior hepatic flexure region anastomosis, omental resection. Subjective:  Resting in bed eating lunch. This is first meal since diet advanced. States he feels better. Denies nausea, abdominal pain, shortness of breath, chest pain. Has been up in room.      Reviewed interval ancillary notes    Current Medications  metoclopramide (REGLAN) injection 10 mg, Q6H  morphine (PF) injection 2 mg, Q2H PRN   Or  morphine injection 4 mg, Q2H PRN  acetaminophen (TYLENOL) tablet 1,000 mg, 3 times per day  ketorolac (TORADOL) injection 15 mg, Q6H PRN  pantoprazole (PROTONIX) injection 40 mg, Daily  metoclopramide (REGLAN) injection 10 mg, Q6H  oxyCODONE-acetaminophen (PERCOCET) 5-325 MG per tablet 1 tablet, Q6H PRN  calcium carbonate (TUMS) chewable tablet 500 mg, TID PRN  potassium chloride (KLOR-CON M) extended release tablet 40 mEq, PRN   Or  potassium bicarb-citric acid (EFFER-K) effervescent tablet 40 mEq, PRN   Or  potassium chloride 10 mEq/100 mL IVPB (Peripheral Line), PRN  dextrose 5 % and 0.45 % NaCl with KCl 20 mEq infusion, Continuous  glucose (GLUTOSE) 40 % oral gel 15 g, PRN  dextrose 50 % IV solution, PRN  glucagon (rDNA) injection 1 mg, PRN  dextrose 5 % solution, PRN  HYDROmorphone HCl PF (DILAUDID) injection 0.5 mg, Q3H PRN  morphine (PF) injection 2 mg, Q3H PRN  LORazepam (ATIVAN) injection 0.5 mg, Q6H PRN  sodium chloride flush 0.9 % injection 5-40 mL, 2 times per day  sodium chloride flush 0.9 % injection 5-40 mL, PRN  0.9 % sodium chloride infusion, PRN  enoxaparin (LOVENOX) injection 40 mg, Daily  ondansetron (ZOFRAN-ODT) disintegrating tablet 4 mg, Q8H PRN   Or  ondansetron (ZOFRAN) injection 4 mg, Q6H PRN  polyethylene glycol (GLYCOLAX) packet 17 g, Daily PRN  acetaminophen (TYLENOL) tablet 650 mg, Q6H PRN   Or  acetaminophen (TYLENOL) suppository 650 mg, Q6H PRN        Objective:  /77   Pulse 104   Temp 98.7 °F (37.1 °C) (Oral)   Resp 14   Ht 5' 11\" (1.803 m)   Wt 182 lb 1.6 oz (82.6 kg)   SpO2 99%   BMI 25.40 kg/m²     Intake/Output Summary (Last 24 hours) at 12/15/2021 1157  Last data filed at 12/15/2021 0855  Gross per 24 hour   Intake 3485.42 ml   Output    Net 3485.42 ml      Wt Readings from Last 3 Encounters:   12/11/21 182 lb 1.6 oz (82.6 kg)   12/01/21 169 lb 6.4 oz (76.8 kg)   10/13/21 175 lb 3.2 oz (79.5 kg)     General:  Awake, alert, oriented in NAD  Skin:  Warm and dry. No unusual bruising or rash  Neck:  Supple. No JVD or carotid bruit appreciated  Chest:  Normal effort.   Clear to auscultation, no wheezes/rhonchi/rales  Cardiovascular:  RRR, normal S1/S2, no murmur/gallop/rub  Abdomen:  Soft, appropriate operative tenderness, +bowel sounds, midline dressing C/D/I  Extremities:  No edema  Neurological: No focal deficits  Psychological: Normal mood and affect    Labs and Tests:  CBC:   Recent Labs     12/13/21  0829 12/14/21  0655 12/15/21  0558   WBC 7.0 5.9 7.3   HGB 10.0* 8.9* 9.3*    176 190     BMP:    Recent Labs     12/13/21  0829 12/14/21  3176 12/15/21  0558   * 132* 134*   K 3.9 3.7 4.4    103 105   CO2 23 22 23   BUN 3* 3* 3*   CREATININE 0.5* 0.5* 0.6*   GLUCOSE 112* 103* 113*     Hepatic: No results for input(s): AST, ALT, ALB, BILITOT, ALKPHOS in the last 72 hours. CXR 12/5/2021:  New right parahilar airspace opacity suggesting pneumonia    CT Abdomen / Pelvis 12/5/2021:  1. Worsening small bowel obstruction with transition point at the ileocolic   anastomosis.  There is focal 2.2 cm area of wall thickening which may reflect   underlying stricture or mass.  No free air or pneumatosis. 2. Otherwise improved small bowel wall thickening. 3. Moderate to large volume ascites.  There is overall anasarca with   subcutaneous edema and pleural effusions as well. 4. New ground-glass attenuation and slightly increasing consolidative changes   within the lung bases suspicious for superimposed pneumonia.  Right lower   lobe mass is otherwise partially obscured due to atelectasis. 5. Hepatic steatosis.  No significant interval change in hepatic lesions. 6. Splenomegaly. Shelda Clock is a subtle area of low attenuation within the   lateral spleen, nonspecific and could be related to timing of the contrast   bolus; however, findings could also be related to developing splenic infarct. Xray Abdomen 12/12/2021:  1. Improving adynamic ileus. Xray Abdomen 12/13/2021:  Similar appearance demonstrating a mild ileus    Problem List  Active Problems:    Bowel obstruction (HCC)    Carcinomatosis (HCC)    Omental mass  Resolved Problems:    * No resolved hospital problems. *       Assessment & Plan:   1. Bowel obstruction. S/p exploratory lap with ileocolonic resection of distal ileum with prior hepatic flexure anastomosis with omental resection 12/6. Completed 7 day course Zosyn. Abd xray 12/13 with mild ileus. Diet has been advanced today. Remains on IV fluids at 40 ml/hr. Surgery on board.    2. Abnormal CXR suggestive of pneumonia present on admission. Patient remains afebrile with normal WBC. He has completed 7 days of zosyn. He is walking in the halls and using IS  3. GERD. Continue PPI. 4. HTN. Controlled. Continue to monitor. 5. Anemia of chronic disease. Hgb 10.0-> 8.9-> 9.3, no signs active bleeding. Continue to follow. 6. Moderate malnutrition due to surgery / cancer. Dietary following and offering supplements. 7. Metastatic colon cancer with carcinomatosis and intra abdominal ascites, liver and lung mets,  He is followed by Dr Rosemary Barone. Anticipates additional therapy when he heals from current surgery or possible clinical trial at [facility]. Disposition: Pt anticipates d/c home to his condo. He has a room mate who with assist with his care if needed. He walks in the halls with no assistance. Possible DC tomorrow if tolerates diet. Diet: ADULT ORAL NUTRITION SUPPLEMENT; Breakfast, Lunch, Dinner, HS Snack; Clear Liquid Oral Supplement  ADULT DIET;  Regular; Low Fiber  Code:Full Code  DVT PPX: enoxaparin      VIJAY James - NAS   12/15/2021 11:57 AM

## 2021-12-15 NOTE — PROGRESS NOTES
2000:  PM shift assessment complete, see flow sheets. Medications given per orders, see MAR. Pt. Denies needs at this time. The care plan and education has been reviewed and mutually agreed upon with the patient.

## 2021-12-15 NOTE — PROGRESS NOTES
[facility]        Progress Note    Patient Name: Teresa Johnson  MRN: [id number]  YOB: 1960  Date of Evaluation: 12/15/2021    Subjective:  No acute events overnight  Pain controlled  No nausea or vomiting; has only had small amount of low fiber diet, reports eating only liquids yesterday  Passing flatus and stool, mildly bloated   Resting in bed at this time, reports ambulating without difficulty    Post-Operative Day #9      Vital Signs:  Patient Vitals for the past 24 hrs:   BP Temp Temp src Pulse Resp SpO2   12/15/21 1115 112/77 98.7 °F (37.1 °C) Oral 104 14    12/15/21 0730 116/77 98.5 °F (36.9 °C) Oral 101 14 99 %   12/15/21 0400 113/76 98.4 °F (36.9 °C) Oral 97 16 97 %   12/15/21 0000 124/87 98.3 °F (36.8 °C) Oral 94 16 96 %   21 2000 (!) 104/56 98.7 °F (37.1 °C) Oral 100 16 99 %      TEMPERATURE HISTORY 24H: Temp (24hrs), Av.5 °F (36.9 °C), Min:98.3 °F (36.8 °C), Max:98.7 °F (37.1 °C)    BLOOD PRESSURE HISTORY: Systolic (37GQO), XWP:913 , Min:102 , WIY:680    Diastolic (34CVA), YNE:19, Min:56, Max:88      Intake/Output:  I/O last 3 completed shifts:   In: 3245.4 [P.O.:480; I.V.:2765.4]  Out: -   I/O this shift:  In: 240 [P.O.:240]  Out: -   Drain/tube Output:       Physical Exam:  General: awake, alert, oriented to person, place, time  Lungs: unlabored respirations  Abdomen: soft, mildly distended, incisional tenderness only, bowel sounds active  Skin/Wound: healing well, no drainage, no erythema, well approximated with staples--dressing changed    Labs:  CBC:    Recent Labs     21  0829 21  0655 12/15/21  0558   WBC 7.0 5.9 7.3   HGB 10.0* 8.9* 9.3*   HCT 29.7* 26.6* 27.7*    176 190     BMP:    Recent Labs     21  0829 21  0655 12/15/21  0558   * 132* 134*   K 3.9 3.7 4.4    103 105   CO2 23 22 23   BUN 3* 3* 3*   CREATININE 0.5* 0.5* 0.6*   GLUCOSE 112* 103* 113*     Hepatic:    No results for input(s): AST, ALT, ALB, BILITOT, ALKPHOS in the last 72 hours. Amylase:  No results found for: AMYLASE  Lipase:    Lab Results   Component Value Date    LIPASE 13.0 12/05/2021    LIPASE 10.0 11/29/2021      Mag:    Lab Results   Component Value Date    MG 2.20 12/02/2021    MG 1.70 12/02/2021     Phos:     Lab Results   Component Value Date    PHOS 2.7 12/01/2021    PHOS 3.4 10/14/2021      Coags:   Lab Results   Component Value Date    PROTIME 15.8 12/05/2021    INR 1.38 12/05/2021    APTT 40.9 12/01/2021       Cultures:  Anaerobic culture  No results found for: LABANAE  Fungus stain  No results found for requested labs within last 30 days. Gram stain  No results found for requested labs within last 30 days. Organism  No results found for: [facility]  Surgical culture  No results found for: CXSURG  Blood culture 1  Results in Past 30 Days  Result Component Current Result Ref Range Previous Result Ref Range   Blood Culture, Routine No Growth after 4 days of incubation. (12/5/2021)  Not in Time Range      Blood culture 2  Results in Past 30 Days  Result Component Current Result Ref Range Previous Result Ref Range   Culture, Blood 2 No Growth after 4 days of incubation. (12/5/2021)  Not in Time Range      Fecal occult  No results found for requested labs within last 30 days. GI bacterial pathogens by PCR  Results in Past 30 Days  Result Component Current Result Ref Range Previous Result Ref Range   GI Bacterial Pathogens By PCR No Shigella spp/EIEC DNA detected  No Shiga toxin-producing gene(s) detected  No Campylobacter spp. (jejuni and coli)DNA detected  No Salmonella spp. DNA detected  Normal Range:  None detected   (11/30/2021)  Not in Time Range      C. difficile  No results found for requested labs within last 30 days.      Urine culture  Lab Results   Component Value Date    LABURIN No growth at 18-36 hours 04/12/2018       Pathology:  OR 12/6/2021--FINAL DIAGNOSIS:     Ileocolonic segmental resection and omental resection:   - Invasive colonic adenocarcinoma, moderately differentiated. - Margins not involved. - One pericolonic lymph nodes positive for metastatic carcinoma (1/13). - Omentum involved by adenocarcinoma. Procedure: Segmental resection of ileocolonic anastomosis   Tumor Site: ileocolonic anastomosis site   Tumor Size: Greatest dimension (centimeter): 3.7        Additional dimensions (centimeters): 2.2 x 0.5   Macroscopic tumor perforation: Not identified   Histologic Type: Adenocarcinoma   Histologic Grade: G2 moderate differentiated   Tumor Extension: Tumor invades through muscularis propria into subserosal   adipose tissue. MARGINS   All margins are uninvolved by invasive carcinoma, high grade dysplasia /   intramucosal carcinoma, and low grade dysplasia      Margins examined: Proximal, distal, and radial    + Distance of invasive carcinoma from closest margin (millimeters or   centimeters): 30 mm    + Specify closest margin: Radial     Treatment Effect (applicable to carcinomas treated with neoadjuvant   therapy): No known pre surgical treatment   Lymph-Vascular Invasion: Not identified   Perineural Invasion: Not identified   + Tumor Budding (Note I)         Low score (0-4)   Type of Polyp in Which Invasive Carcinoma Arose: Not applicable   Tumor deposits: Present     Regional lymph nodes     Number of Lymph nodes Involved: 1     Number of Lymph Nodes Examined: 13     Pathologic Stage Classification (pTNM, AJCC 8th Edition)     TNM Descriptors (select all that apply)         r (recurrent)     Primary Tumor (pT):      pT3: Tumor invades through muscularis propria into pericolorectal   tissue     Regional Lymph Nodes (pN):      pN1a: One regional lymph node is positive     pM Category      pM1     + Additional pathologic findings: Not applicable. + Ancillary studies: MSI pending     Imaging:  I have personally reviewed the following films:    No results found.     Scheduled Meds:   metoclopramide  10 mg IntraVENous Q6H    acetaminophen  1,000 mg Oral 3 times per day    pantoprazole  40 mg IntraVENous Daily    metoclopramide  10 mg IntraVENous Q6H    sodium chloride flush  5-40 mL IntraVENous 2 times per day    enoxaparin  40 mg SubCUTAneous Daily     Continuous Infusions:   dextrose 5% and 0.45% NaCl with KCl 20 mEq 40 mL/hr at 12/14/21 9733    dextrose      sodium chloride Stopped (12/13/21 0843)     PRN Meds:.morphine **OR** morphine, ketorolac, oxyCODONE-acetaminophen, calcium carbonate, potassium chloride **OR** potassium alternative oral replacement **OR** potassium chloride, glucose, dextrose, glucagon (rDNA), dextrose, HYDROmorphone, morphine, LORazepam, sodium chloride flush, sodium chloride, ondansetron **OR** ondansetron, polyethylene glycol, acetaminophen **OR** acetaminophen      Assessment:  57 y.o. male admitted with   1. SBO (small bowel obstruction) ([facility] [address]    2. Pneumonia due to infectious organism, unspecified laterality, unspecified part of lung    3. Severe sepsis (HCC)        Status-post exploratory laparotomy, ileocolonic resection of distal ileum and prior hepatic flexure region anastomosis, omental resection on 12/6/2021 for small bowel obstruction, history of colon cancer, intra-abdominal ascites, carcinomatosis and small bowel obstruction from recurrent cancer  Pneumonia  Colon cancer with liver and lung metastasis  Hypertension   Postoperative ileus      Plan:  1. Local wound care with dry dressing  2. Low fiber diet as tolerated; monitor bowel function--passing stool but still slightly bloated  3. Stop IV hydration; monitor and correct electrolytes  4. Activity as tolerated, ambulate TID, up to chair for meals  5. Pulmonary toilet, incentive spirometry  6. PRN analgesics and antiemetics--minimizing narcotics as tolerated, transition to PO  7. DVT prophylaxis with Lovenox and SCD's + RIZWAN hose, elevate legs while in bed  8.  Management of medical comorbid etiologies per primary team and consulting services  9. Disposition: Anticipate discharge home tomorrow from a surgical perspective if tolerating diet     EDUCATION:  Educated patient on plan of care and disease process--all questions answered. Plans discussed with patient and nursing. Reviewed and discussed with Dr. Sonya Alvarado. Signed:  VIJAY Dailey CNP  12/15/2021 2:39 PM     60-year-old male with metastatic colon cancer who is postop day #9 status post ileocolonic resection. Having bowel function but remains slightly distended. This may be his baseline. Continue general diet. Aiming for possible discharge home tomorrow.

## 2021-12-16 LAB
ANION GAP SERPL CALCULATED.3IONS-SCNC: 6 MMOL/L (ref 3–16)
BUN BLDV-MCNC: 4 MG/DL (ref 7–20)
CALCIUM SERPL-MCNC: 7.8 MG/DL (ref 8.3–10.6)
CHLORIDE BLD-SCNC: 105 MMOL/L (ref 99–110)
CO2: 23 MMOL/L (ref 21–32)
CREAT SERPL-MCNC: 0.6 MG/DL (ref 0.8–1.3)
GFR AFRICAN AMERICAN: >60
GFR NON-AFRICAN AMERICAN: >60
GLUCOSE BLD-MCNC: 102 MG/DL (ref 70–99)
GLUCOSE BLD-MCNC: 122 MG/DL (ref 70–99)
GLUCOSE BLD-MCNC: 138 MG/DL (ref 70–99)
GLUCOSE BLD-MCNC: 93 MG/DL (ref 70–99)
HCT VFR BLD CALC: 26.4 % (ref 40.5–52.5)
HEMOGLOBIN: 8.9 G/DL (ref 13.5–17.5)
MCH RBC QN AUTO: 33.6 PG (ref 26–34)
MCHC RBC AUTO-ENTMCNC: 33.8 G/DL (ref 31–36)
MCV RBC AUTO: 99.5 FL (ref 80–100)
PDW BLD-RTO: 16.4 % (ref 12.4–15.4)
PERFORMED ON: ABNORMAL
PERFORMED ON: ABNORMAL
PERFORMED ON: NORMAL
PLATELET # BLD: 162 K/UL (ref 135–450)
PMV BLD AUTO: 7.6 FL (ref 5–10.5)
POTASSIUM SERPL-SCNC: 5 MMOL/L (ref 3.5–5.1)
RBC # BLD: 2.65 M/UL (ref 4.2–5.9)
SODIUM BLD-SCNC: 134 MMOL/L (ref 136–145)
WBC # BLD: 5.4 K/UL (ref 4–11)

## 2021-12-16 PROCEDURE — 6370000000 HC RX 637 (ALT 250 FOR IP): Performed by: NURSE PRACTITIONER

## 2021-12-16 PROCEDURE — 36415 COLL VENOUS BLD VENIPUNCTURE: CPT

## 2021-12-16 PROCEDURE — APPNB30 APP NON BILLABLE TIME 0-30 MINS: Performed by: NURSE PRACTITIONER

## 2021-12-16 PROCEDURE — 1200000000 HC SEMI PRIVATE

## 2021-12-16 PROCEDURE — 2580000003 HC RX 258: Performed by: SURGERY

## 2021-12-16 PROCEDURE — 85027 COMPLETE CBC AUTOMATED: CPT

## 2021-12-16 PROCEDURE — 99024 POSTOP FOLLOW-UP VISIT: CPT | Performed by: SURGERY

## 2021-12-16 PROCEDURE — 6360000002 HC RX W HCPCS: Performed by: SURGERY

## 2021-12-16 PROCEDURE — 6360000002 HC RX W HCPCS: Performed by: NURSE PRACTITIONER

## 2021-12-16 PROCEDURE — 80048 BASIC METABOLIC PNL TOTAL CA: CPT

## 2021-12-16 PROCEDURE — APPSS15 APP SPLIT SHARED TIME 0-15 MINUTES: Performed by: NURSE PRACTITIONER

## 2021-12-16 PROCEDURE — C9113 INJ PANTOPRAZOLE SODIUM, VIA: HCPCS | Performed by: NURSE PRACTITIONER

## 2021-12-16 RX ORDER — OXYCODONE HYDROCHLORIDE AND ACETAMINOPHEN 5; 325 MG/1; MG/1
1 TABLET ORAL EVERY 4 HOURS PRN
Status: DISCONTINUED | OUTPATIENT
Start: 2021-12-16 | End: 2021-12-17 | Stop reason: HOSPADM

## 2021-12-16 RX ORDER — OXYCODONE HYDROCHLORIDE AND ACETAMINOPHEN 5; 325 MG/1; MG/1
1 TABLET ORAL EVERY 6 HOURS PRN
Qty: 25 TABLET | Refills: 0 | Status: SHIPPED | OUTPATIENT
Start: 2021-12-16 | End: 2021-12-23

## 2021-12-16 RX ORDER — OXYCODONE HYDROCHLORIDE AND ACETAMINOPHEN 5; 325 MG/1; MG/1
2 TABLET ORAL EVERY 4 HOURS PRN
Status: DISCONTINUED | OUTPATIENT
Start: 2021-12-16 | End: 2021-12-17 | Stop reason: HOSPADM

## 2021-12-16 RX ADMIN — METOCLOPRAMIDE 10 MG: 5 INJECTION, SOLUTION INTRAMUSCULAR; INTRAVENOUS at 00:53

## 2021-12-16 RX ADMIN — METOCLOPRAMIDE 10 MG: 5 INJECTION, SOLUTION INTRAMUSCULAR; INTRAVENOUS at 20:34

## 2021-12-16 RX ADMIN — OXYCODONE AND ACETAMINOPHEN 2 TABLET: 5; 325 TABLET ORAL at 12:37

## 2021-12-16 RX ADMIN — OXYCODONE 5 MG: 5 TABLET ORAL at 08:09

## 2021-12-16 RX ADMIN — OXYCODONE 5 MG: 5 TABLET ORAL at 04:05

## 2021-12-16 RX ADMIN — OXYCODONE AND ACETAMINOPHEN 2 TABLET: 5; 325 TABLET ORAL at 20:28

## 2021-12-16 RX ADMIN — PANTOPRAZOLE SODIUM 40 MG: 40 INJECTION, POWDER, FOR SOLUTION INTRAVENOUS at 08:09

## 2021-12-16 RX ADMIN — HYDROMORPHONE HYDROCHLORIDE 0.5 MG: 1 INJECTION, SOLUTION INTRAMUSCULAR; INTRAVENOUS; SUBCUTANEOUS at 02:20

## 2021-12-16 RX ADMIN — Medication 10 ML: at 08:10

## 2021-12-16 RX ADMIN — METOCLOPRAMIDE 10 MG: 5 INJECTION, SOLUTION INTRAMUSCULAR; INTRAVENOUS at 08:09

## 2021-12-16 RX ADMIN — HYDROMORPHONE HYDROCHLORIDE 0.5 MG: 1 INJECTION, SOLUTION INTRAMUSCULAR; INTRAVENOUS; SUBCUTANEOUS at 06:07

## 2021-12-16 RX ADMIN — OXYCODONE 5 MG: 5 TABLET ORAL at 00:01

## 2021-12-16 RX ADMIN — METOCLOPRAMIDE 10 MG: 5 INJECTION, SOLUTION INTRAMUSCULAR; INTRAVENOUS at 14:47

## 2021-12-16 RX ADMIN — OXYCODONE AND ACETAMINOPHEN 2 TABLET: 5; 325 TABLET ORAL at 16:26

## 2021-12-16 RX ADMIN — HYDROMORPHONE HYDROCHLORIDE 0.5 MG: 1 INJECTION, SOLUTION INTRAMUSCULAR; INTRAVENOUS; SUBCUTANEOUS at 10:11

## 2021-12-16 RX ADMIN — ENOXAPARIN SODIUM 40 MG: 100 INJECTION SUBCUTANEOUS at 08:09

## 2021-12-16 ASSESSMENT — PAIN SCALES - GENERAL
PAINLEVEL_OUTOF10: 4
PAINLEVEL_OUTOF10: 7
PAINLEVEL_OUTOF10: 6
PAINLEVEL_OUTOF10: 6
PAINLEVEL_OUTOF10: 5
PAINLEVEL_OUTOF10: 2
PAINLEVEL_OUTOF10: 7
PAINLEVEL_OUTOF10: 7
PAINLEVEL_OUTOF10: 6
PAINLEVEL_OUTOF10: 6
PAINLEVEL_OUTOF10: 5
PAINLEVEL_OUTOF10: 4
PAINLEVEL_OUTOF10: 5
PAINLEVEL_OUTOF10: 4

## 2021-12-16 ASSESSMENT — PAIN DESCRIPTION - FREQUENCY: FREQUENCY: CONTINUOUS

## 2021-12-16 ASSESSMENT — PAIN DESCRIPTION - PAIN TYPE: TYPE: SURGICAL PAIN

## 2021-12-16 ASSESSMENT — PAIN DESCRIPTION - DESCRIPTORS: DESCRIPTORS: ACHING

## 2021-12-16 ASSESSMENT — PAIN DESCRIPTION - ONSET: ONSET: ON-GOING

## 2021-12-16 ASSESSMENT — PAIN DESCRIPTION - PROGRESSION: CLINICAL_PROGRESSION: NOT CHANGED

## 2021-12-16 ASSESSMENT — PAIN DESCRIPTION - ORIENTATION: ORIENTATION: MID

## 2021-12-16 ASSESSMENT — PAIN - FUNCTIONAL ASSESSMENT: PAIN_FUNCTIONAL_ASSESSMENT: PREVENTS OR INTERFERES SOME ACTIVE ACTIVITIES AND ADLS

## 2021-12-16 ASSESSMENT — PAIN DESCRIPTION - LOCATION: LOCATION: ABDOMEN

## 2021-12-16 NOTE — PLAN OF CARE
Problem: Nausea/Vomiting:  Goal: Absence of nausea/vomiting  Description: Absence of nausea/vomiting  12/16/2021 0924 by Fouzia Jones RN  Outcome: Ongoing  12/16/2021 0326 by Kierra Schaefer RN  Outcome: Ongoing  Goal: Able to drink  Description: Able to drink  12/16/2021 0924 by Fouzia Jones RN  Outcome: Ongoing  12/16/2021 0326 by Kierra Schaefer RN  Outcome: Ongoing  Goal: Able to eat  Description: Able to eat  12/16/2021 0924 by Fouzia Jones RN  Outcome: Ongoing  12/16/2021 0326 by Kierra Schaefer RN  Outcome: Ongoing  Goal: Ability to achieve adequate nutritional intake will improve  Description: Ability to achieve adequate nutritional intake will improve  12/16/2021 0924 by Fouzia Jones RN  Outcome: Ongoing  12/16/2021 0326 by Kierra Schaefer RN  Outcome: Ongoing     Problem: Pain:  Goal: Pain level will decrease  Description: Pain level will decrease  12/16/2021 0924 by Fouzia Jones RN  Outcome: Ongoing  12/16/2021 0326 by Kierra Schaefer RN  Outcome: Ongoing  Goal: Control of acute pain  Description: Control of acute pain  12/16/2021 0924 by Fouzia Jones RN  Outcome: Ongoing  12/16/2021 0326 by Kierra Schaefer RN  Outcome: Ongoing  Goal: Control of chronic pain  Description: Control of chronic pain  12/16/2021 0924 by Fouzia Jones RN  Outcome: Ongoing  12/16/2021 0326 by Kierra Schaefer RN  Outcome: Ongoing     Problem: Falls - Risk of:  Goal: Will remain free from falls  Description: Will remain free from falls  12/16/2021 0924 by Fouzia Jones RN  Outcome: Ongoing  12/16/2021 0326 by Kierra Schaefer RN  Outcome: Ongoing  Goal: Absence of physical injury  Description: Absence of physical injury  12/16/2021 0924 by Fouzia Jones RN  Outcome: Ongoing  12/16/2021 0326 by Kierra Schaefer RN  Outcome: Ongoing     Problem: Nutrition  Goal: Optimal nutrition therapy  12/16/2021 0924 by Fouzia Jones RN  Outcome: Ongoing  12/16/2021 0326 by Kierra Schaefer RN  Outcome: Ongoing

## 2021-12-16 NOTE — PROGRESS NOTES
results for input(s): AST, ALT, ALB, BILITOT, ALKPHOS in the last 72 hours. Amylase:  No results found for: AMYLASE  Lipase:    Lab Results   Component Value Date    LIPASE 13.0 12/05/2021    LIPASE 10.0 11/29/2021      Mag:    Lab Results   Component Value Date    MG 2.20 12/02/2021    MG 1.70 12/02/2021     Phos:     Lab Results   Component Value Date    PHOS 2.7 12/01/2021    PHOS 3.4 10/14/2021      Coags:   Lab Results   Component Value Date    PROTIME 15.8 12/05/2021    INR 1.38 12/05/2021    APTT 40.9 12/01/2021       Cultures:  Anaerobic culture  No results found for: LABANAE  Fungus stain  No results found for requested labs within last 30 days. Gram stain  No results found for requested labs within last 30 days. Organism  No results found for: [facility]  Surgical culture  No results found for: CXSURG  Blood culture 1  Results in Past 30 Days  Result Component Current Result Ref Range Previous Result Ref Range   Blood Culture, Routine No Growth after 4 days of incubation. (12/5/2021)  Not in Time Range      Blood culture 2  Results in Past 30 Days  Result Component Current Result Ref Range Previous Result Ref Range   Culture, Blood 2 No Growth after 4 days of incubation. (12/5/2021)  Not in Time Range      Fecal occult  No results found for requested labs within last 30 days. GI bacterial pathogens by PCR  Results in Past 30 Days  Result Component Current Result Ref Range Previous Result Ref Range   GI Bacterial Pathogens By PCR No Shigella spp/EIEC DNA detected  No Shiga toxin-producing gene(s) detected  No Campylobacter spp. (jejuni and coli)DNA detected  No Salmonella spp. DNA detected  Normal Range:  None detected   (11/30/2021)  Not in Time Range      C. difficile  No results found for requested labs within last 30 days.      Urine culture  Lab Results   Component Value Date    LABURIN No growth at 18-36 hours 04/12/2018       Pathology:  OR 12/6/2021--FINAL DIAGNOSIS:     Ileocolonic segmental resection and omental resection:   - Invasive colonic adenocarcinoma, moderately differentiated. - Margins not involved. - One pericolonic lymph nodes positive for metastatic carcinoma (1/13). - Omentum involved by adenocarcinoma. Procedure: Segmental resection of ileocolonic anastomosis   Tumor Site: ileocolonic anastomosis site   Tumor Size: Greatest dimension (centimeter): 3.7        Additional dimensions (centimeters): 2.2 x 0.5   Macroscopic tumor perforation: Not identified   Histologic Type: Adenocarcinoma   Histologic Grade: G2 moderate differentiated   Tumor Extension: Tumor invades through muscularis propria into subserosal   adipose tissue. MARGINS   All margins are uninvolved by invasive carcinoma, high grade dysplasia /   intramucosal carcinoma, and low grade dysplasia      Margins examined: Proximal, distal, and radial    + Distance of invasive carcinoma from closest margin (millimeters or   centimeters): 30 mm    + Specify closest margin: Radial     Treatment Effect (applicable to carcinomas treated with neoadjuvant   therapy): No known pre surgical treatment   Lymph-Vascular Invasion: Not identified   Perineural Invasion: Not identified   + Tumor Budding (Note I)         Low score (0-4)   Type of Polyp in Which Invasive Carcinoma Arose: Not applicable   Tumor deposits: Present     Regional lymph nodes     Number of Lymph nodes Involved: 1     Number of Lymph Nodes Examined: 13     Pathologic Stage Classification (pTNM, AJCC 8th Edition)     TNM Descriptors (select all that apply)         r (recurrent)     Primary Tumor (pT):      pT3: Tumor invades through muscularis propria into pericolorectal   tissue     Regional Lymph Nodes (pN):      pN1a: One regional lymph node is positive     pM Category      pM1     + Additional pathologic findings: Not applicable. + Ancillary studies: MSI pending     Imaging:  I have personally reviewed the following films:    No results found.     Scheduled Meds:   metoclopramide  10 mg IntraVENous Q6H    pantoprazole  40 mg IntraVENous Daily    metoclopramide  10 mg IntraVENous Q6H    sodium chloride flush  5-40 mL IntraVENous 2 times per day    enoxaparin  40 mg SubCUTAneous Daily     Continuous Infusions:   dextrose      sodium chloride Stopped (12/13/21 0843)     PRN Meds:.oxyCODONE-acetaminophen **OR** oxyCODONE-acetaminophen, ketorolac, HYDROmorphone, calcium carbonate, potassium chloride **OR** potassium alternative oral replacement **OR** potassium chloride, glucose, dextrose, glucagon (rDNA), dextrose, LORazepam, sodium chloride flush, sodium chloride, ondansetron **OR** ondansetron, polyethylene glycol, acetaminophen **OR** acetaminophen      Assessment:  57 y.o. male admitted with   1. SBO (small bowel obstruction) ([facility] [address]    2. Pneumonia due to infectious organism, unspecified laterality, unspecified part of lung    3. Severe sepsis (HCC)        Status-post exploratory laparotomy, ileocolonic resection of distal ileum and prior hepatic flexure region anastomosis, omental resection on 12/6/2021 for small bowel obstruction, history of colon cancer, intra-abdominal ascites, carcinomatosis and small bowel obstruction from recurrent cancer  Pneumonia  Colon cancer with liver and lung metastasis  Hypertension   Postoperative ileus      Plan:  1. Local wound care with dry dressing  2. Low fiber diet as tolerated; monitor bowel function--passing stool  3. Monitor and correct electrolytes  4. Activity as tolerated, ambulate TID, up to chair for meals  5. Pulmonary toilet, incentive spirometry  6. PRN analgesics and antiemetics--minimizing narcotics as tolerated, transition to PO  7. DVT prophylaxis with Lovenox and SCD's + RIZWAN hose, elevate legs while in bed  8. Management of medical comorbid etiologies per primary team and consulting services  9.  Disposition: Okay for discharge home today if pain controlled on oral analgesics; follow up with Dr. Jamal Seth in 1 week     EDUCATION:  Educated patient on plan of care and disease process--all questions answered. Plans discussed with patient and nursing. Reviewed and discussed with Dr. Katerina Stinson. Signed:  VIJAY Dinero CNP  12/16/2021 1:44 PM     57-year-old male with metastatic colon cancer who is postop day #10 status post ileocolonic resection. Doing well but pain control continues to be an issue. Will increase dosage of oral narcotic pain medications. If pain controlled, he can be discharged home later today or tomorrow.

## 2021-12-16 NOTE — PROGRESS NOTES
[facility]    12/16/2021 11:09 AM        Name: Sully Pan . Admitted: 12/5/2021  Primary Care Provider: Sean Cooper MD (Tel: [phone])      Chief Complaint   Patient presents with    Abdominal Pain     Pt states he has had belly pain since last thursday, and also vomiting. Feels like he is constipated as well. Hx colon cancer     Brief History: Patient is a 56 yo male with hx metastatic colon cancer, colectomy, HTN. He was recently hospitalized (11/29-12/2) with bowel obstruction which was managed medically. He returned to hospital few days later with worsening abdominal pain, vomiting and constipation. CT abd/pelvis showed small bowel obstruction at ileocolic anastomosis and moderate to large ascites. 12/6/2021 PROCEDURE:  Exploratory laparotomy, ileocolonic resection of distal ileum and prior hepatic flexure region anastomosis, omental resection. Subjective:  Resting in bed. There were no acute overnight events, abdominal pain slowly improving but he is still requiring IV dilaudid for pain management. Tolerating advance in diet, notes some bloating but feels it is at baseline. Denies nausea, diarrhea.      Reviewed interval ancillary notes    Current Medications  metoclopramide (REGLAN) injection 10 mg, Q6H  acetaminophen (TYLENOL) tablet 1,000 mg, 3 times per day  ketorolac (TORADOL) injection 15 mg, Q6H PRN  HYDROmorphone HCl PF (DILAUDID) injection 0.5 mg, Q4H PRN  oxyCODONE (ROXICODONE) immediate release tablet 5 mg, Q4H PRN  pantoprazole (PROTONIX) injection 40 mg, Daily  metoclopramide (REGLAN) injection 10 mg, Q6H  calcium carbonate (TUMS) chewable tablet 500 mg, TID PRN  potassium chloride (KLOR-CON M) extended release tablet 40 mEq, PRN   Or  potassium bicarb-citric acid (EFFER-K) effervescent tablet 40 mEq, PRN   Or  potassium chloride 10 mEq/100 mL IVPB (Peripheral Line), PRN  glucose (GLUTOSE) 40 % oral gel 15 g, PRN  dextrose 50 % IV solution, PRN  glucagon (rDNA) injection 1 mg, PRN  dextrose 5 % solution, PRN  LORazepam (ATIVAN) injection 0.5 mg, Q6H PRN  sodium chloride flush 0.9 % injection 5-40 mL, 2 times per day  sodium chloride flush 0.9 % injection 5-40 mL, PRN  0.9 % sodium chloride infusion, PRN  enoxaparin (LOVENOX) injection 40 mg, Daily  ondansetron (ZOFRAN-ODT) disintegrating tablet 4 mg, Q8H PRN   Or  ondansetron (ZOFRAN) injection 4 mg, Q6H PRN  polyethylene glycol (GLYCOLAX) packet 17 g, Daily PRN  acetaminophen (TYLENOL) tablet 650 mg, Q6H PRN   Or  acetaminophen (TYLENOL) suppository 650 mg, Q6H PRN        Objective:  /85   Pulse 113   Temp 97.9 °F (36.6 °C) (Oral)   Resp 16   Ht 5' 11\" (1.803 m)   Wt 182 lb 1.6 oz (82.6 kg)   SpO2 98%   BMI 25.40 kg/m²     Intake/Output Summary (Last 24 hours) at 12/16/2021 1109  Last data filed at 12/16/2021 9735  Gross per 24 hour   Intake 480 ml   Output    Net 480 ml      Wt Readings from Last 3 Encounters:   12/11/21 182 lb 1.6 oz (82.6 kg)   12/01/21 169 lb 6.4 oz (76.8 kg)   10/13/21 175 lb 3.2 oz (79.5 kg)     General:  Awake, alert, oriented in NAD  Skin:  Warm and dry. No unusual bruising or rash  Neck:  Supple. No JVD appreciated  Chest:  Normal effort.   Clear to auscultation, no wheezes/rhonchi/rales  Cardiovascular:  RRR, normal S1/S2, no murmur/gallop/rub  Abdomen:  Soft, + operative tenderness, +bowel sounds  Extremities:  No edema  Neurological: No focal deficits  Psychological: Normal mood and affect      Labs and Tests:  CBC:   Recent Labs     12/14/21  0655 12/15/21  0558 12/16/21  0510   WBC 5.9 7.3 5.4   HGB 8.9* 9.3* 8.9*    190 162     BMP:    Recent Labs     12/14/21  0655 12/15/21  0558 12/16/21  0510   * 134* 134*   K 3.7 4.4 5.0    105 105   CO2 22 23 23   BUN 3* 3* 4*   CREATININE 0.5* 0.6* 0.6*   GLUCOSE 103* 113* 102*     Hepatic: No results for input(s): AST, ALT, ALB, BILITOT, ALKPHOS in the last 72 hours. CXR 12/5/2021:  New right parahilar airspace opacity suggesting pneumonia    CT Abdomen / Pelvis 12/5/2021:  1. Worsening small bowel obstruction with transition point at the ileocolic   anastomosis.  There is focal 2.2 cm area of wall thickening which may reflect   underlying stricture or mass.  No free air or pneumatosis. 2. Otherwise improved small bowel wall thickening. 3. Moderate to large volume ascites.  There is overall anasarca with   subcutaneous edema and pleural effusions as well. 4. New ground-glass attenuation and slightly increasing consolidative changes   within the lung bases suspicious for superimposed pneumonia.  Right lower   lobe mass is otherwise partially obscured due to atelectasis. 5. Hepatic steatosis.  No significant interval change in hepatic lesions. 6. Splenomegaly. Cathye Gauss is a subtle area of low attenuation within the   lateral spleen, nonspecific and could be related to timing of the contrast   bolus; however, findings could also be related to developing splenic infarct. Xray Abdomen 12/12/2021:  1. Improving adynamic ileus. Xray Abdomen 12/13/2021:  Similar appearance demonstrating a mild ileus    Problem List  Active Problems:    Bowel obstruction (HCC)    Carcinomatosis (HCC)    Omental mass  Resolved Problems:    * No resolved hospital problems. *       Assessment & Plan:   1. Bowel obstruction. S/p exploratory lap with ileocolonic resection of distal ileum with prior hepatic flexure anastomosis with omental resection 12/6. Completed 7 day course Zosyn. Abd xray 12/13 with mild ileus. Diet has been advanced and tolerated. Still requiring IV dilaudid for pain control. Surgery on board. 2. Abnormal CXR suggestive of pneumonia present on admission. Patient remains afebrile with normal WBC. He has completed 7 days of zosyn. He is walking in the halls and using IS  3. GERD. Continue PPI. 4. HTN. Controlled. Continue to monitor. 5. Anemia of chronic disease. Hgb 10.0-> 8.9-> 9.3->8.9, no signs active bleeding. Continue to follow. 6. Moderate malnutrition due to surgery / cancer. Dietary following and offering supplements. 7. Metastatic colon cancer with carcinomatosis and intra abdominal ascites, liver and lung mets,  He is followed by Dr Chrystal Coleman. Anticipates additional therapy when he heals from current surgery or possible clinical trial at [facility]. Disposition: Pt anticipates d/c home to his condo. He has a room mate who can assist with his care if needed. He walks in the halls with no assistance. Possible DC later today if pain controlled with po meds. Diet: ADULT ORAL NUTRITION SUPPLEMENT; Breakfast, Lunch, Dinner, HS Snack; Clear Liquid Oral Supplement  ADULT DIET; Regular; Low Fiber  Code:Full Code  DVT PPX: enoxaparin      VIJAY Neff CNP   12/16/2021 11:09 AM       Addendum 12/16/2021 3:23 pm: Followed up with nursing. Patient still with considerable pain and requiring IV dilaudid for relief. His Percocet dose was increased to 2 tabs today by surgery. Hopefully home tomorrow if pain controlled.   MIRIAN Lucas

## 2021-12-17 VITALS
TEMPERATURE: 98.5 F | SYSTOLIC BLOOD PRESSURE: 101 MMHG | HEIGHT: 71 IN | RESPIRATION RATE: 18 BRPM | BODY MASS INDEX: 25.49 KG/M2 | OXYGEN SATURATION: 97 % | DIASTOLIC BLOOD PRESSURE: 69 MMHG | HEART RATE: 103 BPM | WEIGHT: 182.1 LBS

## 2021-12-17 LAB
ANION GAP SERPL CALCULATED.3IONS-SCNC: 5 MMOL/L (ref 3–16)
BUN BLDV-MCNC: 5 MG/DL (ref 7–20)
CALCIUM SERPL-MCNC: 7.9 MG/DL (ref 8.3–10.6)
CHLORIDE BLD-SCNC: 105 MMOL/L (ref 99–110)
CO2: 24 MMOL/L (ref 21–32)
CREAT SERPL-MCNC: 0.6 MG/DL (ref 0.8–1.3)
GFR AFRICAN AMERICAN: >60
GFR NON-AFRICAN AMERICAN: >60
GLUCOSE BLD-MCNC: 124 MG/DL (ref 70–99)
GLUCOSE BLD-MCNC: 159 MG/DL (ref 70–99)
GLUCOSE BLD-MCNC: 91 MG/DL (ref 70–99)
HCT VFR BLD CALC: 25.9 % (ref 40.5–52.5)
HEMOGLOBIN: 8.7 G/DL (ref 13.5–17.5)
MCH RBC QN AUTO: 33.3 PG (ref 26–34)
MCHC RBC AUTO-ENTMCNC: 33.6 G/DL (ref 31–36)
MCV RBC AUTO: 99.2 FL (ref 80–100)
PDW BLD-RTO: 16.7 % (ref 12.4–15.4)
PERFORMED ON: ABNORMAL
PERFORMED ON: ABNORMAL
PLATELET # BLD: 169 K/UL (ref 135–450)
PMV BLD AUTO: 7.4 FL (ref 5–10.5)
POTASSIUM SERPL-SCNC: 5 MMOL/L (ref 3.5–5.1)
RBC # BLD: 2.61 M/UL (ref 4.2–5.9)
SODIUM BLD-SCNC: 134 MMOL/L (ref 136–145)
WBC # BLD: 5.6 K/UL (ref 4–11)

## 2021-12-17 PROCEDURE — 6360000002 HC RX W HCPCS: Performed by: NURSE PRACTITIONER

## 2021-12-17 PROCEDURE — 99024 POSTOP FOLLOW-UP VISIT: CPT | Performed by: SURGERY

## 2021-12-17 PROCEDURE — C9113 INJ PANTOPRAZOLE SODIUM, VIA: HCPCS | Performed by: NURSE PRACTITIONER

## 2021-12-17 PROCEDURE — 36415 COLL VENOUS BLD VENIPUNCTURE: CPT

## 2021-12-17 PROCEDURE — 85027 COMPLETE CBC AUTOMATED: CPT

## 2021-12-17 PROCEDURE — APPSS15 APP SPLIT SHARED TIME 0-15 MINUTES: Performed by: NURSE PRACTITIONER

## 2021-12-17 PROCEDURE — 80048 BASIC METABOLIC PNL TOTAL CA: CPT

## 2021-12-17 PROCEDURE — 6360000002 HC RX W HCPCS: Performed by: SURGERY

## 2021-12-17 PROCEDURE — 2580000003 HC RX 258: Performed by: SURGERY

## 2021-12-17 PROCEDURE — 6370000000 HC RX 637 (ALT 250 FOR IP): Performed by: NURSE PRACTITIONER

## 2021-12-17 PROCEDURE — APPNB30 APP NON BILLABLE TIME 0-30 MINS: Performed by: NURSE PRACTITIONER

## 2021-12-17 RX ORDER — METOCLOPRAMIDE 10 MG/1
10 TABLET ORAL 4 TIMES DAILY
Qty: 120 TABLET | Refills: 0 | Status: SHIPPED | OUTPATIENT
Start: 2021-12-17 | End: 2022-01-16

## 2021-12-17 RX ORDER — PANTOPRAZOLE SODIUM 40 MG/1
40 TABLET, DELAYED RELEASE ORAL
Qty: 30 TABLET | Refills: 1 | Status: SHIPPED | OUTPATIENT
Start: 2021-12-17

## 2021-12-17 RX ADMIN — OXYCODONE AND ACETAMINOPHEN 2 TABLET: 5; 325 TABLET ORAL at 13:21

## 2021-12-17 RX ADMIN — METOCLOPRAMIDE 10 MG: 5 INJECTION, SOLUTION INTRAMUSCULAR; INTRAVENOUS at 04:46

## 2021-12-17 RX ADMIN — PANTOPRAZOLE SODIUM 40 MG: 40 INJECTION, POWDER, FOR SOLUTION INTRAVENOUS at 08:54

## 2021-12-17 RX ADMIN — ENOXAPARIN SODIUM 40 MG: 100 INJECTION SUBCUTANEOUS at 08:53

## 2021-12-17 RX ADMIN — OXYCODONE AND ACETAMINOPHEN 2 TABLET: 5; 325 TABLET ORAL at 00:31

## 2021-12-17 RX ADMIN — Medication 10 ML: at 09:05

## 2021-12-17 RX ADMIN — OXYCODONE AND ACETAMINOPHEN 2 TABLET: 5; 325 TABLET ORAL at 08:54

## 2021-12-17 RX ADMIN — METOCLOPRAMIDE 10 MG: 5 INJECTION, SOLUTION INTRAMUSCULAR; INTRAVENOUS at 08:54

## 2021-12-17 RX ADMIN — OXYCODONE AND ACETAMINOPHEN 2 TABLET: 5; 325 TABLET ORAL at 04:44

## 2021-12-17 RX ADMIN — METOCLOPRAMIDE 10 MG: 5 INJECTION, SOLUTION INTRAMUSCULAR; INTRAVENOUS at 13:21

## 2021-12-17 ASSESSMENT — PAIN SCALES - GENERAL
PAINLEVEL_OUTOF10: 7
PAINLEVEL_OUTOF10: 0
PAINLEVEL_OUTOF10: 0

## 2021-12-17 ASSESSMENT — PAIN DESCRIPTION - PROGRESSION
CLINICAL_PROGRESSION: NOT CHANGED

## 2021-12-17 ASSESSMENT — PAIN DESCRIPTION - LOCATION
LOCATION: ABDOMEN

## 2021-12-17 ASSESSMENT — PAIN DESCRIPTION - ORIENTATION: ORIENTATION: LEFT

## 2021-12-17 ASSESSMENT — PAIN DESCRIPTION - PAIN TYPE
TYPE: SURGICAL PAIN

## 2021-12-17 NOTE — PROGRESS NOTES
Data- discharge order received, pt verbalized agreement to discharge, disposition to previous residence, no needs for HHC/DME. Action- discharge instructions prepared and given to pt, pt verbalized understanding. Medication information packet given r/t NEW and/or CHANGED prescriptions emphasizing name/purpose/side effects, pt verbalized understanding. Discharge instruction summary: Diet- general, Activity- independent, Primary Care Physician as follows: Brenden Albrecht [phone] f/u appointment 1 week, immunizations reviewed, prescription medications filled outpatient pharmacy. Inpatient surgical procedure precautions reviewed 1. WEIGHT: Admit Weight: 179 lb 3.2 oz (81.3 kg) (12/05/21 1540)        Today  Weight: 182 lb 1.6 oz (82.6 kg) (12/11/21 2000)       2. O2 SAT.: SpO2: 97 % (12/17/21 1126)    Response- Pt belongings gathered, IV removed. Disposition is home (no HHC/DME needs), transported with PCA, taken to lobby via w/c, no complications.

## 2021-12-17 NOTE — PROGRESS NOTES
Data- discharge order received, pt verbalized agreement to discharge, disposition to previous residence, no needs for HHC/DME. Action- discharge instructions prepared and given to pt, pt verbalized understanding. Medication information packet given r/t NEW and/or CHANGED prescriptions emphasizing name/purpose/side effects, pt verbalized understanding. Discharge instruction summary: Diet- general, Activity- as tolerated, Primary Care Physician as follows: Abebe Bynum [phone] f/u appointment in 2 weeks, immunizations reviewed, prescription medications filled outpatient pharmacy. Inpatient surgical procedure precautions reviewed    1. WEIGHT: Admit Weight: 179 lb 3.2 oz (81.3 kg) (12/05/21 1540)        Today  Weight: 182 lb 1.6 oz (82.6 kg) (12/11/21 2000)       2. O2 SAT.: SpO2: 97 % (12/17/21 1126)    Response- Pt belongings gathered, IV removed. Disposition is home (no HHC/DME needs), transported with nurse, taken to lobby via w/c, no complications.

## 2021-12-17 NOTE — PROGRESS NOTES
CLINICAL PHARMACY NOTE: MEDS TO BEDS    Total # of Prescriptions Filled: 3   The following medications were delivered to the patient:  · Pantoprazole  · Metoclopramide  · percocet    Additional Documentation:  Patient signed for 3 scripts at bedside

## 2021-12-17 NOTE — PROGRESS NOTES
[facility]        Progress Note    Patient Name: Carolee Ledesma  MRN: [id number]  YOB: 1960  Date of Evaluation: 2021    Subjective:  No acute events overnight  Pain controlled on oral analgesics  No nausea or vomiting, tolerating low fiber diet  Passing flatus and stool, mild bloating following meals--feels at baseline  Resting in bed at this time, reports ambulating without difficulty    Post-Operative Day #11      Vital Signs:  Patient Vitals for the past 24 hrs:   BP Temp Temp src Pulse Resp SpO2   21 1126 101/69 98.5 °F (36.9 °C) Oral 103 18 97 %   21 0845 127/89 97.5 °F (36.4 °C) Oral 110 18 100 %   21 0359 110/75 98 °F (36.7 °C) Oral 89 15 98 %   21 0031 110/79 97.2 °F (36.2 °C) Axillary 98 16 97 %   21 2030 111/75 98.4 °F (36.9 °C) Oral 100 15 100 %   21 1630 101/76 98.4 °F (36.9 °C) Oral 98 14 100 %      TEMPERATURE HISTORY 24H: Temp (24hrs), Av °F (36.7 °C), Min:97.2 °F (36.2 °C), Max:98.5 °F (36.9 °C)    BLOOD PRESSURE HISTORY: Systolic (65EKG), WDF:652 , Min:101 , DFL:616    Diastolic (55GTQ), GXN:68, Min:69, Max:89      Intake/Output:  I/O last 3 completed shifts: In: 960 [P.O.:960]  Out: -   No intake/output data recorded.   Drain/tube Output:       Physical Exam:  General: awake, alert, oriented to person, place, time  Lungs: unlabored respirations  Abdomen: soft, mildly distended, incisional tenderness only, bowel sounds active  Skin/Wound: healing well, no drainage, mild erythema around staples--stable to improved, well approximated with staples--dressing changed    Labs:  CBC:    Recent Labs     12/15/21  0558 21  0510 21  0730   WBC 7.3 5.4 5.6   HGB 9.3* 8.9* 8.7*   HCT 27.7* 26.4* 25.9*    162 169     BMP:    Recent Labs     12/15/21  0558 21  0510 21  0730   * 134* 134*   K 4.4 5.0 5.0    105 105   CO2 23 23 24   BUN 3* 4* 5*   CREATININE 0.6* 0.6* 0.6*   GLUCOSE 113* 102* 91     Hepatic:    No results for input(s): AST, ALT, ALB, BILITOT, ALKPHOS in the last 72 hours. Amylase:  No results found for: AMYLASE  Lipase:    Lab Results   Component Value Date    LIPASE 13.0 12/05/2021    LIPASE 10.0 11/29/2021      Mag:    Lab Results   Component Value Date    MG 2.20 12/02/2021    MG 1.70 12/02/2021     Phos:     Lab Results   Component Value Date    PHOS 2.7 12/01/2021    PHOS 3.4 10/14/2021      Coags:   Lab Results   Component Value Date    PROTIME 15.8 12/05/2021    INR 1.38 12/05/2021    APTT 40.9 12/01/2021       Cultures:  Anaerobic culture  No results found for: LABANAE  Fungus stain  No results found for requested labs within last 30 days. Gram stain  No results found for requested labs within last 30 days. Organism  No results found for: [facility]  Surgical culture  No results found for: CXSURG  Blood culture 1  Results in Past 30 Days  Result Component Current Result Ref Range Previous Result Ref Range   Blood Culture, Routine No Growth after 4 days of incubation. (12/5/2021)  Not in Time Range      Blood culture 2  Results in Past 30 Days  Result Component Current Result Ref Range Previous Result Ref Range   Culture, Blood 2 No Growth after 4 days of incubation. (12/5/2021)  Not in Time Range      Fecal occult  No results found for requested labs within last 30 days. GI bacterial pathogens by PCR  Results in Past 30 Days  Result Component Current Result Ref Range Previous Result Ref Range   GI Bacterial Pathogens By PCR No Shigella spp/EIEC DNA detected  No Shiga toxin-producing gene(s) detected  No Campylobacter spp. (jejuni and coli)DNA detected  No Salmonella spp. DNA detected  Normal Range:  None detected   (11/30/2021)  Not in Time Range      C. difficile  No results found for requested labs within last 30 days.      Urine culture  Lab Results   Component Value Date    LABURIN No growth at 18-36 hours 04/12/2018       Pathology:  OR 12/6/2021--FINAL DIAGNOSIS:     Ileocolonic segmental resection and omental resection:   - Invasive colonic adenocarcinoma, moderately differentiated. - Margins not involved. - One pericolonic lymph nodes positive for metastatic carcinoma (1/13). - Omentum involved by adenocarcinoma. Procedure: Segmental resection of ileocolonic anastomosis   Tumor Site: ileocolonic anastomosis site   Tumor Size: Greatest dimension (centimeter): 3.7        Additional dimensions (centimeters): 2.2 x 0.5   Macroscopic tumor perforation: Not identified   Histologic Type: Adenocarcinoma   Histologic Grade: G2 moderate differentiated   Tumor Extension: Tumor invades through muscularis propria into subserosal   adipose tissue. MARGINS   All margins are uninvolved by invasive carcinoma, high grade dysplasia /   intramucosal carcinoma, and low grade dysplasia      Margins examined: Proximal, distal, and radial    + Distance of invasive carcinoma from closest margin (millimeters or   centimeters): 30 mm    + Specify closest margin: Radial     Treatment Effect (applicable to carcinomas treated with neoadjuvant   therapy): No known pre surgical treatment   Lymph-Vascular Invasion: Not identified   Perineural Invasion: Not identified   + Tumor Budding (Note I)         Low score (0-4)   Type of Polyp in Which Invasive Carcinoma Arose: Not applicable   Tumor deposits: Present     Regional lymph nodes     Number of Lymph nodes Involved: 1     Number of Lymph Nodes Examined: 13     Pathologic Stage Classification (pTNM, AJCC 8th Edition)     TNM Descriptors (select all that apply)         r (recurrent)     Primary Tumor (pT):      pT3: Tumor invades through muscularis propria into pericolorectal   tissue     Regional Lymph Nodes (pN):      pN1a: One regional lymph node is positive     pM Category      pM1     + Additional pathologic findings: Not applicable.    + Ancillary studies: MSI pending     Imaging:  I have personally reviewed the following films:    No results found. Scheduled Meds:   metoclopramide  10 mg IntraVENous Q6H    pantoprazole  40 mg IntraVENous Daily    metoclopramide  10 mg IntraVENous Q6H    sodium chloride flush  5-40 mL IntraVENous 2 times per day    enoxaparin  40 mg SubCUTAneous Daily     Continuous Infusions:   dextrose      sodium chloride Stopped (12/13/21 0843)     PRN Meds:.oxyCODONE-acetaminophen **OR** oxyCODONE-acetaminophen, ketorolac, HYDROmorphone, calcium carbonate, potassium chloride **OR** potassium alternative oral replacement **OR** potassium chloride, glucose, dextrose, glucagon (rDNA), dextrose, LORazepam, sodium chloride flush, sodium chloride, ondansetron **OR** ondansetron, polyethylene glycol, acetaminophen **OR** acetaminophen      Assessment:  57 y.o. male admitted with   1. SBO (small bowel obstruction) ([facility] [place] [address]    2. Pneumonia due to infectious organism, unspecified laterality, unspecified part of lung    3. Severe sepsis (HCC)        Status-post exploratory laparotomy, ileocolonic resection of distal ileum and prior hepatic flexure region anastomosis, omental resection on 12/6/2021 for small bowel obstruction, history of colon cancer, intra-abdominal ascites, carcinomatosis and small bowel obstruction from recurrent cancer  Pneumonia  Colon cancer with liver and lung metastasis  Hypertension   Postoperative ileus      Plan:  1. Local wound care with dry dressing  2. Low fiber diet as tolerated; monitor bowel function--passing stool  3. Monitor and correct electrolytes  4. Activity as tolerated, ambulate TID, up to chair for meals  5. Pulmonary toilet, incentive spirometry  6. PRN analgesics and antiemetics--minimizing narcotics as tolerated, transition to PO  7. DVT prophylaxis with Lovenox and SCD's + RIZWAN hose, elevate legs while in bed  8. Management of medical comorbid etiologies per primary team and consulting services  9.  Disposition: Okay for discharge home from a surgical perspective; follow up with Dr. Ángel Pritchard in 1 week     EDUCATION:  Educated patient on plan of care and disease process--all questions answered. Plans discussed with patient and nursing. Reviewed and discussed with Dr. Pablito Washburn.       Signed:  VIJAY Stauffer - CNP  12/17/2021 11:50 AM     Patient seen and examined   Doing well  Okay for discharge home from surgical perspective  Follow-up with Dr. Ángel Pritchard next week

## 2021-12-17 NOTE — PROGRESS NOTES
Morning assessment completed, VSS, pain meds given, see MAR, pt heart rate raises when he walks around the room, dressing site intact, old drainage present, The care plan and education has been reviewed and mutually agreed upon with the patient, no nausea, or vomiting,  Will continue to monitor.

## 2021-12-17 NOTE — DISCHARGE SUMMARY
[facility] DISCHARGE SUMMARY    Patient Demographics    Patient. Patricia Moseley  Date of Birth. 1960  MRN. [id number]     Primary care provider. Mariposa Arredondo MD  (Tel: [phone])    Admit date: 12/5/2021    Discharge date (blank if same as Note Date): Note Date: 12/17/2021     Reason for Hospitalization. Chief Complaint   Patient presents with    Abdominal Pain     Pt states he has had belly pain since last thursday, and also vomiting. Feels like he is constipated as well. Hx colon cancer         Significant Findings. Active Problems:    Bowel obstruction (HCC)    Carcinomatosis (HCC)    Omental mass  Resolved Problems:    * No resolved hospital problems. *       Problems and results from this hospitalization that need follow up. 1. Recommend follow up CXR to ensure clearing. 2. Surgery follow up in 1 week. Significant test results and incidental findings. CXR 12/5/2021:  New right parahilar airspace opacity suggesting pneumonia     CT Abdomen / Pelvis 12/5/2021:  1. Worsening small bowel obstruction with transition point at the ileocolic   anastomosis.  There is focal 2.2 cm area of wall thickening which may reflect   underlying stricture or mass.  No free air or pneumatosis. 2. Otherwise improved small bowel wall thickening. 3. Moderate to large volume ascites.  There is overall anasarca with   subcutaneous edema and pleural effusions as well. 4. New ground-glass attenuation and slightly increasing consolidative changes   within the lung bases suspicious for superimposed pneumonia.  Right lower   lobe mass is otherwise partially obscured due to atelectasis. 5. Hepatic steatosis.  No significant interval change in hepatic lesions.    6. Splenomegaly. Isaiah Cogan is a subtle area of low attenuation within the   lateral spleen, nonspecific and could be related to timing of the contrast bolus; however, findings could also be related to developing splenic infarct.      Xray Abdomen 12/12/2021:  1. Improving adynamic ileus.     Xray Abdomen 12/13/2021:  Similar appearance demonstrating a mild ileus        Invasive procedures and treatments. 12/6/2021 PROCEDURE:  Exploratory laparotomy, ileocolonic resection of distal ileum and prior hepatic flexure region anastomosis, omental resection. [facility] Course. Patient is a 56 yo male with hx metastatic colon cancer, colectomy, HTN. He was recently hospitalized (11/29-12/2) with bowel obstruction which was managed medically. He returned to hospital few days later with worsening abdominal pain, vomiting and constipation. CT abd/pelvis showed small bowel obstruction at ileocolic anastomosis and moderate to large ascites. 1. Bowel obstruction. S/p exploratory lap with ileocolonic resection of distal ileum with prior hepatic flexure anastomosis with omental resection 12/6.  Completed 7 day course Zosyn. Abd xray 12/13 with mild ileus. Diet has been advanced and tolerated, pain adequately controlled. Surgery okay with DC. 2. Abnormal CXR suggestive of pneumonia present on admission. He has completed 7 day course of zosyn. He is walking in the halls and using IS. He remains afebrile and no leukocytosis. 3. GERD. Continue PPI. 4. HTN. Controlled. 5. Anemia. Likely secondary to chronic disease and acute blood loss from surgery. Hgb 10.0-> 8.9-> 9.3->8.9, no signs active bleeding. 6. Moderate malnutrition due to surgery / cancer. Seen by dietitian and supplements recommended. 7. Metastatic colon cancer with carcinomatosis and intra abdominal ascites, liver and lung mets,  He is followed by Dr Lopez Co.  Anticipates additional therapy when he heals from current surgery or possible clinical trial at [facility].     Reports pain slowly improving, and controlled with po pain med. Comfortable with DC home.  Declines need for home health, he has been fluorouracil 500 MG/10ML SOLN        STOP taking these medications    ciprofloxacin 500 MG tablet  Commonly known as: CIPRO     dicyclomine 10 MG capsule  Commonly known as: Bentyl     Lomotil 2.5-0.025 MG per tablet  Generic drug: diphenoxylate-atropine     metroNIDAZOLE 500 MG tablet  Commonly known as: FLAGYL           Where to Get Your Medications      These medications were sent to [facility], [address] [place] [address], [address]    Phone: [phone]   · metoclopramide 10 MG tablet  · pantoprazole 40 MG tablet     You can get these medications from any pharmacy    Bring a paper prescription for each of these medications  · oxyCODONE-acetaminophen 5-325 MG per tablet     Reviewed PDMP    Discharge recommendations given to patient. Follow Up. Surgery  in 1 week   Disposition. home  Activity. activity as tolerated  Diet: ADULT ORAL NUTRITION SUPPLEMENT; Breakfast, Lunch, Dinner, HS Snack; Clear Liquid Oral Supplement  ADULT DIET; Regular; Low Fiber      Spent 40 minutes in discharge process.     Signed:  VIJAY Montana CNP     12/17/2021 11:40 AM

## 2021-12-17 NOTE — PROGRESS NOTES
2038: Assessment complete, VSS, BS hypoactive, midline abd dressing CDI, abd rounded, pt denies BM tonight, states was having some loose stools earlier, tolerating regular diet, encouraged pt to take oral pain meds tonight, discussed care plan, pt mutually agrees, call light and belongings in reach, will continue monitoring. 0030: percocet for pain, see ELIJAH Owen RN

## 2021-12-20 ENCOUNTER — CARE COORDINATION (OUTPATIENT)
Dept: CASE MANAGEMENT | Age: 61
End: 2021-12-20

## 2021-12-20 DIAGNOSIS — K66.8 OMENTAL MASS: Primary | ICD-10-CM

## 2021-12-20 NOTE — CARE COORDINATION
[facility] Transitions Initial Follow Up Call    Call within 2 business days of discharge: Yes    Patient: Dedrick Favre Patient : 1960   MRN: [id number]  Reason for Admission:   Discharge Date: 21 RARS: Readmission Risk Score: 21.5 ( )      Last Discharge [facility]       Complaint Diagnosis Description Type Department Provider    21 Abdominal Pain SBO (small bowel obstruction) ([facility] [address] . .. ED to Hosp-Admission (Discharged) (ADMITTED) Zeeshan Kong MD; Dianne Rendon. .. Non-face-to-face services provided:  Obtained and reviewed discharge summary and/or continuity of care documents  1111F completed     Transitions of Care Initial Call    Was this an external facility discharge? No Discharge Facility:     Challenges to be reviewed by the provider   Additional needs identified to be addressed with provider: No  none             Method of communication with provider : none      Advance Care Planning:   Does patient have an Advance Directive: reviewed and current. Was this a readmission? No  Patient stated reason for admission: abd pain  Patients top risk factors for readmission: SBO; Ex lap, ileocolonic resection of distal ileum and prior hepatic flexure region anastomosis, omental resection. Care Transition Nurse (CTN) contacted the patient by telephone to perform post hospital discharge assessment. Verified name and  with patient as identifiers. Provided introduction to self, and explanation of the CTN role. CTN reviewed discharge instructions, medical action plan and red flags with patient who verbalized understanding. Patient given an opportunity to ask questions and does not have any further questions or concerns at this time. Were discharge instructions available to patient? Yes. Reviewed appropriate site of care based on symptoms and resources available to patient including: When to call 911.  The patient agrees to contact the PCP office for questions related to their healthcare. Medication reconciliation was performed with patient, who verbalizes understanding of administration of home medications. Advised obtaining a 90-day supply of all daily and as-needed medications. Reviewed and educated patient on any new and changed medications related to discharge diagnosis. Was patient discharged with a pulse oximeter? No Discussed and confirmed pulse oximeter discharge instructions and when to notify provider or seek emergency care. Pt states doing well, no issues or concerns. Dressing is CDI, taking percocet for pain. Ambulating and tolerating food and liquids well. Seeing surgeon in 2 weeks. Agreed to more CTC f/u calls. CTN provided contact information. Plan for follow-up call in 5-7 days based on severity of symptoms and risk factors. Plan for next call: self management-s/p surgery, f/u appts    Care Transitions 24 Hour Call    Do you have any ongoing symptoms?: Yes  Patient-reported symptoms: Pain (Comment: incisional pain)  Interventions for patient-reported symptoms:  (Comment: expected  after surgery)  Do you have a copy of your discharge instructions?: Yes  Do you have all of your prescriptions and are they filled?: No  Have you been contacted by a [address]?: No  Have you scheduled your follow up appointment?: Yes  How are you going to get to your appointment?: Car - family or friend to transport  Were you discharged with any Home Care or Post Acute Services: No  Do you feel like you have everything you need to keep you well at home?: Yes  Care Transitions Interventions  No Identified Needs         Follow Up  No future appointments.     Minus URMILA Trevizo

## 2021-12-23 ENCOUNTER — OFFICE VISIT (OUTPATIENT)
Dept: SURGERY | Age: 61
End: 2021-12-23

## 2021-12-23 ENCOUNTER — TELEPHONE (OUTPATIENT)
Dept: FAMILY MEDICINE CLINIC | Age: 61
End: 2021-12-23

## 2021-12-23 VITALS — SYSTOLIC BLOOD PRESSURE: 96 MMHG | DIASTOLIC BLOOD PRESSURE: 72 MMHG | WEIGHT: 190 LBS | BODY MASS INDEX: 26.5 KG/M2

## 2021-12-23 DIAGNOSIS — C18.9 COLON CANCER METASTASIZED TO MULTIPLE SITES (HCC): Primary | ICD-10-CM

## 2021-12-23 PROCEDURE — 99024 POSTOP FOLLOW-UP VISIT: CPT | Performed by: SURGERY

## 2021-12-23 RX ORDER — OXYCODONE HYDROCHLORIDE AND ACETAMINOPHEN 5; 325 MG/1; MG/1
1 TABLET ORAL EVERY 6 HOURS PRN
Qty: 30 TABLET | Refills: 0 | Status: SHIPPED | OUTPATIENT
Start: 2021-12-23 | End: 2022-01-07

## 2021-12-23 NOTE — TELEPHONE ENCOUNTER
----- Message from Benedicto Romo MD sent at 12/21/2021  3:43 PM EST -----  He needs hospital follow up scheduled .  VV OR OV

## 2021-12-23 NOTE — LETTER
[facility] [address] [address]  Phone: [phone]  Fax: [phone]    Ashley Petty MD    December 23, 2021     Kalie Durbin [address] [facility]    Patient: Carlos Aguilar   MR Number: [id number]   YOB: 1960   Date of Visit: 12/23/2021       Dear Bushra Krause: Thank you for referring Toby Miranda to me for evaluation/treatment. Below are the relevant portions of my assessment and plan of care. If you have questions, please do not hesitate to call me. I look forward to following Viktor Patel along with you.     Sincerely,      Ashley Petty MD

## 2021-12-28 ENCOUNTER — HOSPITAL ENCOUNTER (OUTPATIENT)
Dept: INTERVENTIONAL RADIOLOGY/VASCULAR | Age: 61
Discharge: HOME | End: 2021-12-28
Payer: COMMERCIAL

## 2021-12-28 VITALS
OXYGEN SATURATION: 98 % | TEMPERATURE: 97.8 F | HEART RATE: 97 BPM | RESPIRATION RATE: 14 BRPM | SYSTOLIC BLOOD PRESSURE: 96 MMHG | DIASTOLIC BLOOD PRESSURE: 66 MMHG

## 2021-12-28 DIAGNOSIS — R18.0 MALIGNANT ASCITES: ICD-10-CM

## 2021-12-28 PROCEDURE — 6360000002 HC RX W HCPCS: Performed by: STUDENT IN AN ORGANIZED HEALTH CARE EDUCATION/TRAINING PROGRAM

## 2021-12-28 PROCEDURE — 7100000010 HC PHASE II RECOVERY - FIRST 15 MIN

## 2021-12-28 PROCEDURE — 2500000003 HC RX 250 WO HCPCS: Performed by: STUDENT IN AN ORGANIZED HEALTH CARE EDUCATION/TRAINING PROGRAM

## 2021-12-28 PROCEDURE — 49083 ABD PARACENTESIS W/IMAGING: CPT

## 2021-12-28 PROCEDURE — P9047 ALBUMIN (HUMAN), 25%, 50ML: HCPCS | Performed by: STUDENT IN AN ORGANIZED HEALTH CARE EDUCATION/TRAINING PROGRAM

## 2021-12-28 PROCEDURE — C1729 CATH, DRAINAGE: HCPCS

## 2021-12-28 PROCEDURE — 7100000011 HC PHASE II RECOVERY - ADDTL 15 MIN

## 2021-12-28 RX ORDER — ALBUMIN (HUMAN) 12.5 G/50ML
25 SOLUTION INTRAVENOUS ONCE
Status: COMPLETED | OUTPATIENT
Start: 2021-12-28 | End: 2021-12-28

## 2021-12-28 RX ORDER — LIDOCAINE HYDROCHLORIDE 10 MG/ML
20 INJECTION, SOLUTION EPIDURAL; INFILTRATION; INTRACAUDAL; PERINEURAL ONCE
Status: COMPLETED | OUTPATIENT
Start: 2021-12-28 | End: 2021-12-28

## 2021-12-28 RX ADMIN — LIDOCAINE HYDROCHLORIDE 8 ML: 10 INJECTION, SOLUTION EPIDURAL; INFILTRATION; INTRACAUDAL; PERINEURAL at 13:44

## 2021-12-28 RX ADMIN — ALBUMIN (HUMAN) 25 G: 0.25 INJECTION, SOLUTION INTRAVENOUS at 14:03

## 2021-12-28 NOTE — BRIEF OP NOTE
Brief Postoperative Note    Marshall Lucas  YOB: 1960  [id number]    Pre-operative Diagnosis: Ascites    Post-operative Diagnosis: Same    Procedure: US Guided Paracentesis    Anesthesia: Local    Surgeons/Assistants: Mor Saldaña MD    Estimated Blood Loss: less than 5 mL    Complications: None    Specimens: Was Obtained: serous Ascitic Fluid    Findings: Technically successful US guided paracentesis, 6L removed    Electronically signed by Mor Saldaña MD on 12/28/2021 at 1:20 PM

## 2021-12-29 ENCOUNTER — TELEPHONE (OUTPATIENT)
Dept: FAMILY MEDICINE CLINIC | Age: 61
End: 2021-12-29

## 2021-12-29 ENCOUNTER — CARE COORDINATION (OUTPATIENT)
Dept: CARE COORDINATION | Age: 61
End: 2021-12-29

## 2021-12-29 NOTE — CARE COORDINATION
[facility] Transitions Follow Up Call    2021    Patient: David Maxx  Patient : 1960   MRN: [id number]  Reason for Admission: SBO   Discharge Date: 21 RARS: Readmission Risk Score: 21.5 ( )         Spoke with: David Lilly     Pt is enrolling in [facility] tomorrow. Pt appreciative of call. Incision healing well, doing fine from yesterdays procedure. Follow Up  No future appointments.     Elda Rocha RN

## 2021-12-29 NOTE — PROGRESS NOTES
Spoke to patient post Paracentesis procedure. Patients site was clean, dry and intact. Patient denies any discomfort post procedure. I addressed all the patients concerns, and directed patient to contact Special Procedures if they had any further questions.

## 2021-12-29 NOTE — TELEPHONE ENCOUNTER
Representative for [place] out of state contacted the office with questions regarding patient's medication, care gaps, and recent hospitalization, requested a fax to be sent with information details. Awaiting fax, will address with Dr Jose A cartagena.

## 2025-04-21 NOTE — CONSULTS
Called pt and scheduled her 5/6/25 w/ Dr. Johnson.    [facility]   CONSULT NOTE    12/9/2021 10:46 AM    Patient Information: Bunny Lopes   Date of Admit:  12/5/2021  Primary Care Physician:  Shakira Oh MD  Requesting Physician:  Bhavana Byrnes MD    Reason for consult:   Evaluation and recommendations for colon cancer    Chief complaint:    Chief Complaint   Patient presents with    Abdominal Pain     Pt states he has had belly pain since last thursday, and also vomiting. Feels like he is constipated as well. Hx colon cancer       History of Present Illness:  Bunny Lopes is a 57 y.o. male on Bhavana Byrnes MD service who was admitted on 12/5/2021 after exploratory laparotomy, for SBO d/t recurrent cancer, carcinomatosis, s/p ileocolonic resection and omental resection. Patient of Ashley Mccartney MD with metastatic colon cancer. He is not currently on treatment due to side effects of chemotherapy. He was recently hospitalized with SBO, improved with conservative treatment. He presented to ED on 12/5/21 with c/o abdominal pain, nausea and vomiting. Past Medical History:     has a past medical history of Anemia, Colon cancer ([address] Hypertension, and Lung nodule.      Past Surgical History:    Past Surgical History:   Procedure Laterality Date    COLECTOMY N/A 12/6/2021    EXPLORATORY LAPAROTOMY, ILEOCOLONIC BOWEL RESECTION, OMENTECTOMY performed by Guy Vera MD at [address] [place] [address] right knee    LIVER BIOPSY Right 10/05/2017    phalen MD at [facility] in specials as outpt    OTHER SURGICAL HISTORY  03/19/2018     Robotic assisted converted to open right colectomy with anastomosis, greater omental vascularized pedicled flap creation    THORACOSCOPY Right 04/16/2018    RIGHT VIDEO ASSISTED THORACOSCOPY WITH WEDGE EXCISION RIGHT    TUNNELED VENOUS PORT PLACEMENT Left 05/30/2017    UPPER GASTROINTESTINAL ENDOSCOPY      WISDOM TOOTH EXTRACTION          Current Medications:     piperacillin-tazobactam  3,375 mg IntraVENous Q8H    sodium chloride flush  5-40 mL IntraVENous 2 times per day    enoxaparin  40 mg SubCUTAneous Daily       Allergies: Allergies   Allergen Reactions    Potassium-Containing Compounds Rash        Social History:    reports that he has never smoked. He has never used smokeless tobacco. He reports current alcohol use of about 2.0 standard drinks of alcohol per week. He reports that he does not use drugs. Family History:     family history includes Cancer in his father; Mult Sclerosis in his mother; No Known Problems in his brother. ROS:    Constitutional:  Negative for fever, chills or night sweats  Eyes:  Negative for exudate, itching  Ears:  Negative for drainage   Nose:  Negative for rhinorrhea, epistaxis  Mouth/Throat:  Negative for hoarseness, sore throat. Respiratory:   Negative for shortness of breath, hemoptysis, wheezing  Cardiovascular: Negative for chest pain, palpitations   Gastrointestinal:  Negative for nausea, vomiting, diarrhea, black stool, bright red blood in the stool  Genitourinary:  Negative for dysuria, hematuria   Hematologic/Lymphatic:  Negative for  bleeding tendency, easy bruising  Musculoskeletal:  Negative for myalgias, arthralgias  Neurologic:  Negative for  confusion,dysarthria. Skin :  Negative for itching, rash  Psychiatric:  Negative for depression, anxiety. Endocrine:  Negative for polydipsia, polyuria, heat /cold intolerance.     PHYSICAL EXAM:    Vitals:  Vitals:    12/09/21 0415   BP: 106/72   Pulse: 92   Resp: 16   Temp: 97.5 °F (36.4 °C)   SpO2: 100%        Intake/Output Summary (Last 24 hours) at 12/9/2021 1046  Last data filed at 12/9/2021 0853  Gross per 24 hour   Intake 0 ml   Output 2250 ml   Net -2250 ml      Wt Readings from Last 3 Encounters:   12/05/21 179 lb 3.2 oz (81.3 kg)   12/01/21 169 lb 6.4 oz (76.8 kg)   10/13/21 175 lb 3.2 oz (79.5 kg)        General appearance: Appears comfortable. Well nourished  Eyes: Sclera clear, pupils equal  ENT: Moist mucus membranes, no thrush  Neck: Trachea midline, symmetrical  Cardiovascular: Regular rhythm, normal S1, S2. No murmur, gallop, rub. No edema in  lower extremities  Respiratory: Clear to auscultation bilaterally. No wheeze. Good inspiratory effort  Gastrointestinal: Abdomen soft, not tender, not distended, normal bowel sounds  Musculoskeletal: No cyanosis in digits, warm extremities  Neurologic: Cranial nerves grossly intact, no motor or speech deficits. Psychiatric: Normal affect. Alert and oriented to time, place and person. Skin: Warm, dry, normal turgor, no rash    DATA:  CBC:   Lab Results   Component Value Date    WBC 6.3 12/09/2021    RBC 2.68 12/09/2021    RBC 5.11 08/21/2017    HGB 9.0 12/09/2021    HCT 26.9 12/09/2021    .4 12/09/2021    MCH 33.5 12/09/2021    MCHC 33.4 12/09/2021    RDW 17.1 12/09/2021     12/09/2021    MPV 7.2 12/09/2021     BMP:  Lab Results   Component Value Date     12/09/2021    K 3.5 12/09/2021    K 3.7 12/06/2021     12/09/2021    CO2 25 12/09/2021    BUN 12 12/09/2021    CREATININE 0.6 12/09/2021    CALCIUM 7.5 12/09/2021    GFRAA >60 12/09/2021    LABGLOM >60 12/09/2021    GLUCOSE 49 12/09/2021    GLUCOSE 105 08/21/2017     Magnesium:   Lab Results   Component Value Date    MG 2.20 12/02/2021    MG 1.70 12/02/2021    MG 1.90 12/01/2021     LIVER PROFILE: No results for input(s): AST, ALT, LIPASE, BILIDIR, BILITOT, ALKPHOS in the last 72 hours. Invalid input(s): AMYLASE,  ALB  PT/INR:    Lab Results   Component Value Date    PROTIME 15.8 12/05/2021    PROTIME 15.8 12/01/2021    PROTIME 12.3 07/02/2020    INR 1.38 12/05/2021    INR 1.38 12/01/2021    INR 1.06 07/02/2020       IMPRESSION/RECOMMENDATIONS:    Active Problems:    Bowel obstruction (HCC)    Carcinomatosis (HCC)    Omental mass  Resolved Problems:    * No resolved hospital problems.  *       Metastatic colon cancer, liver and lung metastasis  -Treatment on hold due to side effects, declining performance status.     Small bowel obstruction  - recently hospitalized with SBO, symptoms improved with conservative treatment  - returned with abdominal pain and nausea and vomiting  - s/p exploratory laparotomy 12/5/21, SBO d/t recurrent cancer, carcinomatosis, s/p ileocolonic resection and omental resection, pathology pending  - CEA on 12/1/21 was 3.9       This plan was discussed with the patient and he/she verbalized understanding. Thank you for allowing us to participate in the care of this patient. Kalee Arteaga, CNP  [facility]    Patient was seen and examined. Agree with above. The patient wants to pursue more treatments once acute events resolved  We will consider outpatient treatment with Lonsurf or Stivarga. Clinical trial at [address] [place] [address] would be another option.     Jaimee Schmitz MD

## (undated) DEVICE — SPONGE LAP W18XL18IN WHT COT 4 PLY FLD STRUNG RADPQ DISP ST

## (undated) DEVICE — APPLICATOR MEDICATED 26 CC SOLUTION HI LT ORNG CHLORAPREP

## (undated) DEVICE — MERCY FAIRFIELD TURNOVER KIT: Brand: MEDLINE INDUSTRIES, INC.

## (undated) DEVICE — SEALER ENDOSCP NANO COAT OPN DIV CRV L JAW LIGASURE IMPACT

## (undated) DEVICE — PICO 7 10CM X 40CM: Brand: PICO™ 7

## (undated) DEVICE — SUTURE PERMAHAND SZ 2-0 L30IN NONABSORBABLE BLK SH L26MM C016D

## (undated) DEVICE — SUTURE ABSORBABLE MONOFILAMENT 0 CTX 60 IN VIO PDS + PDP990G

## (undated) DEVICE — MAJOR SET UP PK

## (undated) DEVICE — 3M™ IOBAN™ 2 ANTIMICROBIAL INCISE DRAPE 6650EZ: Brand: IOBAN™ 2

## (undated) DEVICE — TOTAL TRAY, DB, 100% SILI FOLEY, 16FR 10: Brand: MEDLINE

## (undated) DEVICE — PENCIL ES ULT VAC W TELSCP NOSE EZ CLN BLDE 10FT

## (undated) DEVICE — COVER LT HNDL BLU PLAS

## (undated) DEVICE — Z DISCONTINUED USE 2272117 DRAPE SURG 3 QTR N INVASIVE 2 LAYR DISP

## (undated) DEVICE — DRAPE,LAP,CHOLE,W/TROUGHS,STERILE: Brand: MEDLINE

## (undated) DEVICE — GOWN SIRUS NONREIN LG W/TWL: Brand: MEDLINE INDUSTRIES, INC.

## (undated) DEVICE — BLADE CLIPPER GEN PURP NS

## (undated) DEVICE — STAPLER SKIN H3.9MM WIRE DIA0.58MM CRWN 6.9MM 35 STPL ROT

## (undated) DEVICE — SUTURE PERMAHAND SZ 3-0 L18IN NONABSORBABLE BLK L26MM SH C013D

## (undated) DEVICE — SOLUTION IV IRRIG WATER 1000ML POUR BRL 2F7114

## (undated) DEVICE — SOLUTION IRRIG 1000ML 0.9% SOD CHL USP POUR PLAS BTL

## (undated) DEVICE — TOWEL,OR,DSP,ST,BLUE,DLX,10/PK,8PK/CS: Brand: MEDLINE

## (undated) DEVICE — BLANKET WRM W29.9XL79.1IN UP BODY FORC AIR MISTRAL-AIR

## (undated) DEVICE — RELOAD STPL H4.1X2MM DIA60MM THCK TISS GRN 6 ROW PWR GST B

## (undated) DEVICE — GLOVE SURG SZ 6.5 L11.2IN FNGR THK9.8MIL STRW LTX POLYMER

## (undated) DEVICE — STAPLER INT L28CM 60MM 12 FIRING B FRM PWD + ECHELON FLX